# Patient Record
Sex: MALE | Race: BLACK OR AFRICAN AMERICAN | NOT HISPANIC OR LATINO
[De-identification: names, ages, dates, MRNs, and addresses within clinical notes are randomized per-mention and may not be internally consistent; named-entity substitution may affect disease eponyms.]

---

## 2017-01-09 ENCOUNTER — APPOINTMENT (OUTPATIENT)
Dept: HEART AND VASCULAR | Facility: CLINIC | Age: 67
End: 2017-01-09

## 2017-01-23 ENCOUNTER — APPOINTMENT (OUTPATIENT)
Dept: HEART AND VASCULAR | Facility: CLINIC | Age: 67
End: 2017-01-23

## 2017-01-29 ENCOUNTER — FORM ENCOUNTER (OUTPATIENT)
Age: 67
End: 2017-01-29

## 2017-01-30 ENCOUNTER — OUTPATIENT (OUTPATIENT)
Dept: OUTPATIENT SERVICES | Facility: HOSPITAL | Age: 67
LOS: 1 days | End: 2017-01-30
Payer: COMMERCIAL

## 2017-01-30 DIAGNOSIS — I25.10 ATHEROSCLEROTIC HEART DISEASE OF NATIVE CORONARY ARTERY WITHOUT ANGINA PECTORIS: ICD-10-CM

## 2017-01-30 DIAGNOSIS — I22.2 SUBSEQUENT NON-ST ELEVATION (NSTEMI) MYOCARDIAL INFARCTION: ICD-10-CM

## 2017-01-30 DIAGNOSIS — I10 ESSENTIAL (PRIMARY) HYPERTENSION: ICD-10-CM

## 2017-01-30 PROCEDURE — A9505: CPT

## 2017-01-30 PROCEDURE — 78452 HT MUSCLE IMAGE SPECT MULT: CPT

## 2017-01-30 PROCEDURE — 78452 HT MUSCLE IMAGE SPECT MULT: CPT | Mod: 26

## 2017-01-30 PROCEDURE — 93016 CV STRESS TEST SUPVJ ONLY: CPT

## 2017-01-30 PROCEDURE — A9500: CPT

## 2017-01-30 PROCEDURE — 93018 CV STRESS TEST I&R ONLY: CPT

## 2017-01-30 PROCEDURE — 93017 CV STRESS TEST TRACING ONLY: CPT

## 2017-06-05 ENCOUNTER — APPOINTMENT (OUTPATIENT)
Dept: HEART AND VASCULAR | Facility: CLINIC | Age: 67
End: 2017-06-05

## 2017-06-05 ENCOUNTER — NON-APPOINTMENT (OUTPATIENT)
Age: 67
End: 2017-06-05

## 2017-06-05 VITALS
DIASTOLIC BLOOD PRESSURE: 92 MMHG | HEART RATE: 87 BPM | WEIGHT: 183 LBS | OXYGEN SATURATION: 98 % | HEIGHT: 69 IN | SYSTOLIC BLOOD PRESSURE: 170 MMHG | BODY MASS INDEX: 27.11 KG/M2

## 2017-06-12 ENCOUNTER — LABORATORY RESULT (OUTPATIENT)
Age: 67
End: 2017-06-12

## 2017-06-12 ENCOUNTER — APPOINTMENT (OUTPATIENT)
Dept: HEART AND VASCULAR | Facility: CLINIC | Age: 67
End: 2017-06-12

## 2017-06-13 LAB
25(OH)D3 SERPL-MCNC: 17.7 NG/ML
ALBUMIN SERPL ELPH-MCNC: 4.6 G/DL
ALP BLD-CCNC: 70 U/L
ALT SERPL-CCNC: 27 U/L
ANION GAP SERPL CALC-SCNC: 19 MMOL/L
AST SERPL-CCNC: 25 U/L
BILIRUB SERPL-MCNC: 0.4 MG/DL
BUN SERPL-MCNC: 34 MG/DL
CALCIUM SERPL-MCNC: 9.5 MG/DL
CHLORIDE SERPL-SCNC: 102 MMOL/L
CHOLEST SERPL-MCNC: 251 MG/DL
CHOLEST/HDLC SERPL: 4.1 RATIO
CO2 SERPL-SCNC: 17 MMOL/L
CREAT SERPL-MCNC: 1.87 MG/DL
GLUCOSE SERPL-MCNC: 111 MG/DL
HDLC SERPL-MCNC: 61 MG/DL
LDLC SERPL CALC-MCNC: 148 MG/DL
POTASSIUM SERPL-SCNC: 4.6 MMOL/L
PROT SERPL-MCNC: 7.1 G/DL
SODIUM SERPL-SCNC: 138 MMOL/L
TRIGL SERPL-MCNC: 208 MG/DL
VIT B12 SERPL-MCNC: 568 PG/ML

## 2017-06-14 LAB — HBA1C MFR BLD HPLC: 5.5 %

## 2017-06-15 LAB — APO LP(A) SERPL-MCNC: 145 NMOL/L

## 2017-07-10 ENCOUNTER — APPOINTMENT (OUTPATIENT)
Dept: HEART AND VASCULAR | Facility: CLINIC | Age: 67
End: 2017-07-10

## 2017-08-28 ENCOUNTER — APPOINTMENT (OUTPATIENT)
Dept: INTERNAL MEDICINE | Facility: CLINIC | Age: 67
End: 2017-08-28

## 2018-02-15 ENCOUNTER — NON-APPOINTMENT (OUTPATIENT)
Age: 68
End: 2018-02-15

## 2018-02-15 ENCOUNTER — APPOINTMENT (OUTPATIENT)
Dept: HEART AND VASCULAR | Facility: CLINIC | Age: 68
End: 2018-02-15
Payer: COMMERCIAL

## 2018-02-15 VITALS
HEART RATE: 83 BPM | BODY MASS INDEX: 28.14 KG/M2 | SYSTOLIC BLOOD PRESSURE: 190 MMHG | HEIGHT: 69 IN | DIASTOLIC BLOOD PRESSURE: 92 MMHG | OXYGEN SATURATION: 99 % | WEIGHT: 190 LBS

## 2018-02-15 PROCEDURE — 99214 OFFICE O/P EST MOD 30 MIN: CPT | Mod: 25

## 2018-02-15 PROCEDURE — 93000 ELECTROCARDIOGRAM COMPLETE: CPT

## 2018-02-22 ENCOUNTER — APPOINTMENT (OUTPATIENT)
Dept: INTERNAL MEDICINE | Facility: CLINIC | Age: 68
End: 2018-02-22

## 2018-03-12 ENCOUNTER — APPOINTMENT (OUTPATIENT)
Dept: HEART AND VASCULAR | Facility: CLINIC | Age: 68
End: 2018-03-12

## 2018-06-26 ENCOUNTER — APPOINTMENT (OUTPATIENT)
Dept: HEART AND VASCULAR | Facility: CLINIC | Age: 68
End: 2018-06-26
Payer: COMMERCIAL

## 2018-06-26 VITALS
WEIGHT: 191 LBS | HEIGHT: 69 IN | HEART RATE: 81 BPM | DIASTOLIC BLOOD PRESSURE: 90 MMHG | SYSTOLIC BLOOD PRESSURE: 162 MMHG | BODY MASS INDEX: 28.29 KG/M2 | OXYGEN SATURATION: 98 %

## 2018-06-26 PROCEDURE — 99214 OFFICE O/P EST MOD 30 MIN: CPT

## 2018-09-05 ENCOUNTER — APPOINTMENT (OUTPATIENT)
Dept: HEART AND VASCULAR | Facility: CLINIC | Age: 68
End: 2018-09-05
Payer: MEDICARE

## 2018-09-05 VITALS
HEART RATE: 64 BPM | SYSTOLIC BLOOD PRESSURE: 160 MMHG | OXYGEN SATURATION: 98 % | DIASTOLIC BLOOD PRESSURE: 80 MMHG | HEIGHT: 69 IN

## 2018-09-05 DIAGNOSIS — R06.00 DYSPNEA, UNSPECIFIED: ICD-10-CM

## 2018-09-05 PROCEDURE — 93306 TTE W/DOPPLER COMPLETE: CPT

## 2018-09-05 PROCEDURE — 99214 OFFICE O/P EST MOD 30 MIN: CPT

## 2018-09-11 ENCOUNTER — APPOINTMENT (OUTPATIENT)
Dept: INTERNAL MEDICINE | Facility: CLINIC | Age: 68
End: 2018-09-11

## 2018-10-11 ENCOUNTER — APPOINTMENT (OUTPATIENT)
Dept: HEART AND VASCULAR | Facility: CLINIC | Age: 68
End: 2018-10-11

## 2018-10-29 ENCOUNTER — APPOINTMENT (OUTPATIENT)
Dept: INTERNAL MEDICINE | Facility: CLINIC | Age: 68
End: 2018-10-29

## 2018-10-29 DIAGNOSIS — Z13.89 ENCOUNTER FOR SCREENING FOR OTHER DISORDER: ICD-10-CM

## 2018-12-24 ENCOUNTER — APPOINTMENT (OUTPATIENT)
Dept: INTERNAL MEDICINE | Facility: CLINIC | Age: 68
End: 2018-12-24
Payer: MEDICARE

## 2018-12-24 ENCOUNTER — NON-APPOINTMENT (OUTPATIENT)
Age: 68
End: 2018-12-24

## 2018-12-24 VITALS
OXYGEN SATURATION: 99 % | HEIGHT: 69 IN | WEIGHT: 186 LBS | HEART RATE: 97 BPM | DIASTOLIC BLOOD PRESSURE: 83 MMHG | TEMPERATURE: 98.7 F | BODY MASS INDEX: 27.55 KG/M2 | SYSTOLIC BLOOD PRESSURE: 143 MMHG

## 2018-12-24 DIAGNOSIS — Z82.5 FAMILY HISTORY OF ASTHMA AND OTHER CHRONIC LOWER RESPIRATORY DISEASES: ICD-10-CM

## 2018-12-24 DIAGNOSIS — Z82.49 FAMILY HISTORY OF ISCHEMIC HEART DISEASE AND OTHER DISEASES OF THE CIRCULATORY SYSTEM: ICD-10-CM

## 2018-12-24 DIAGNOSIS — Z83.3 FAMILY HISTORY OF DIABETES MELLITUS: ICD-10-CM

## 2018-12-24 DIAGNOSIS — I22.2 SUBSEQUENT NON-ST ELEVATION (NSTEMI) MYOCARDIAL INFARCTION: ICD-10-CM

## 2018-12-24 DIAGNOSIS — Z23 ENCOUNTER FOR IMMUNIZATION: ICD-10-CM

## 2018-12-24 PROCEDURE — G0438: CPT

## 2018-12-24 PROCEDURE — 90662 IIV NO PRSV INCREASED AG IM: CPT

## 2018-12-24 PROCEDURE — 36415 COLL VENOUS BLD VENIPUNCTURE: CPT

## 2018-12-24 PROCEDURE — 90670 PCV13 VACCINE IM: CPT

## 2018-12-24 PROCEDURE — G0009: CPT

## 2018-12-24 PROCEDURE — 93000 ELECTROCARDIOGRAM COMPLETE: CPT

## 2018-12-24 PROCEDURE — G0008: CPT

## 2018-12-27 LAB
25(OH)D3 SERPL-MCNC: 25.9 NG/ML
ALBUMIN SERPL ELPH-MCNC: 4.5 G/DL
ALP BLD-CCNC: 75 U/L
ALT SERPL-CCNC: 21 U/L
ANION GAP SERPL CALC-SCNC: 12 MMOL/L
AST SERPL-CCNC: 20 U/L
BASOPHILS # BLD AUTO: 0.04 K/UL
BASOPHILS NFR BLD AUTO: 0.5 %
BILIRUB SERPL-MCNC: 0.5 MG/DL
BUN SERPL-MCNC: 17 MG/DL
CALCIUM SERPL-MCNC: 9.3 MG/DL
CHLORIDE SERPL-SCNC: 105 MMOL/L
CHOLEST SERPL-MCNC: 194 MG/DL
CHOLEST/HDLC SERPL: 3 RATIO
CO2 SERPL-SCNC: 17 MMOL/L
CREAT SERPL-MCNC: 1.53 MG/DL
EOSINOPHIL # BLD AUTO: 0.32 K/UL
EOSINOPHIL NFR BLD AUTO: 3.7 %
GLUCOSE SERPL-MCNC: 111 MG/DL
HBA1C MFR BLD HPLC: 5.2 %
HCT VFR BLD CALC: 44.6 %
HCV AB SER QL: NONREACTIVE
HCV S/CO RATIO: 0.06 S/CO
HDLC SERPL-MCNC: 65 MG/DL
HGB BLD-MCNC: 14.6 G/DL
IMM GRANULOCYTES NFR BLD AUTO: 0.2 %
LDLC SERPL CALC-MCNC: 96 MG/DL
LYMPHOCYTES # BLD AUTO: 1.35 K/UL
LYMPHOCYTES NFR BLD AUTO: 15.7 %
MAN DIFF?: NORMAL
MCHC RBC-ENTMCNC: 29.1 PG
MCHC RBC-ENTMCNC: 32.7 GM/DL
MCV RBC AUTO: 89 FL
MONOCYTES # BLD AUTO: 0.54 K/UL
MONOCYTES NFR BLD AUTO: 6.3 %
NEUTROPHILS # BLD AUTO: 6.35 K/UL
NEUTROPHILS NFR BLD AUTO: 73.6 %
PLATELET # BLD AUTO: 347 K/UL
POTASSIUM SERPL-SCNC: 4.3 MMOL/L
PROT SERPL-MCNC: 7.6 G/DL
RBC # BLD: 5.01 M/UL
RBC # FLD: 14.9 %
SODIUM SERPL-SCNC: 134 MMOL/L
TRIGL SERPL-MCNC: 167 MG/DL
WBC # FLD AUTO: 8.62 K/UL

## 2019-03-13 ENCOUNTER — RX CHANGE (OUTPATIENT)
Age: 69
End: 2019-03-13

## 2019-03-13 RX ORDER — HYDRALAZINE HYDROCHLORIDE 25 MG/1
25 TABLET ORAL TWICE DAILY
Qty: 180 | Refills: 3 | Status: DISCONTINUED | COMMUNITY
Start: 2018-09-05 | End: 2019-03-13

## 2019-09-04 ENCOUNTER — RX RENEWAL (OUTPATIENT)
Age: 69
End: 2019-09-04

## 2019-10-21 ENCOUNTER — RX RENEWAL (OUTPATIENT)
Age: 69
End: 2019-10-21

## 2020-01-04 ENCOUNTER — RX RENEWAL (OUTPATIENT)
Age: 70
End: 2020-01-04

## 2020-01-13 ENCOUNTER — RX RENEWAL (OUTPATIENT)
Age: 70
End: 2020-01-13

## 2020-01-25 ENCOUNTER — RX RENEWAL (OUTPATIENT)
Age: 70
End: 2020-01-25

## 2022-05-27 ENCOUNTER — NON-APPOINTMENT (OUTPATIENT)
Age: 72
End: 2022-05-27

## 2022-05-27 ENCOUNTER — APPOINTMENT (OUTPATIENT)
Dept: INTERNAL MEDICINE | Facility: CLINIC | Age: 72
End: 2022-05-27
Payer: MEDICARE

## 2022-05-27 VITALS
BODY MASS INDEX: 23.85 KG/M2 | OXYGEN SATURATION: 100 % | DIASTOLIC BLOOD PRESSURE: 62 MMHG | HEIGHT: 69 IN | WEIGHT: 161 LBS | HEART RATE: 128 BPM | TEMPERATURE: 36.8 F | SYSTOLIC BLOOD PRESSURE: 145 MMHG

## 2022-05-27 DIAGNOSIS — K62.5 HEMORRHAGE OF ANUS AND RECTUM: ICD-10-CM

## 2022-05-27 DIAGNOSIS — E78.49 OTHER HYPERLIPIDEMIA: ICD-10-CM

## 2022-05-27 DIAGNOSIS — R63.4 ABNORMAL WEIGHT LOSS: ICD-10-CM

## 2022-05-27 DIAGNOSIS — Z00.00 ENCOUNTER FOR GENERAL ADULT MEDICAL EXAMINATION W/OUT ABNORMAL FINDINGS: ICD-10-CM

## 2022-05-27 DIAGNOSIS — N52.9 MALE ERECTILE DYSFUNCTION, UNSPECIFIED: ICD-10-CM

## 2022-05-27 DIAGNOSIS — Z13.1 ENCOUNTER FOR SCREENING FOR DIABETES MELLITUS: ICD-10-CM

## 2022-05-27 DIAGNOSIS — R79.89 OTHER SPECIFIED ABNORMAL FINDINGS OF BLOOD CHEMISTRY: ICD-10-CM

## 2022-05-27 PROCEDURE — 93000 ELECTROCARDIOGRAM COMPLETE: CPT

## 2022-05-27 PROCEDURE — 36415 COLL VENOUS BLD VENIPUNCTURE: CPT

## 2022-05-27 PROCEDURE — 99202 OFFICE O/P NEW SF 15 MIN: CPT | Mod: 25

## 2022-05-27 PROCEDURE — G0438: CPT

## 2022-05-28 ENCOUNTER — INPATIENT (INPATIENT)
Facility: HOSPITAL | Age: 72
LOS: 4 days | Discharge: HOME | DRG: 377 | End: 2022-06-02
Attending: HOSPITALIST | Admitting: INTERNAL MEDICINE
Payer: MEDICARE

## 2022-05-28 VITALS
TEMPERATURE: 98 F | WEIGHT: 166.89 LBS | HEART RATE: 129 BPM | DIASTOLIC BLOOD PRESSURE: 93 MMHG | OXYGEN SATURATION: 99 % | HEIGHT: 68 IN | SYSTOLIC BLOOD PRESSURE: 215 MMHG | RESPIRATION RATE: 20 BRPM

## 2022-05-28 DIAGNOSIS — Z96.642 PRESENCE OF LEFT ARTIFICIAL HIP JOINT: Chronic | ICD-10-CM

## 2022-05-28 DIAGNOSIS — Z96.653 PRESENCE OF ARTIFICIAL KNEE JOINT, BILATERAL: Chronic | ICD-10-CM

## 2022-05-28 DIAGNOSIS — Z96.611 PRESENCE OF RIGHT ARTIFICIAL SHOULDER JOINT: Chronic | ICD-10-CM

## 2022-05-28 LAB
ALBUMIN SERPL ELPH-MCNC: 3.1 G/DL — LOW (ref 3.4–5)
ALP SERPL-CCNC: 82 U/L — SIGNIFICANT CHANGE UP (ref 40–120)
ALT FLD-CCNC: 14 U/L — SIGNIFICANT CHANGE UP (ref 12–42)
ANION GAP SERPL CALC-SCNC: 14 MMOL/L — SIGNIFICANT CHANGE UP (ref 9–16)
ANISOCYTOSIS BLD QL: SLIGHT — SIGNIFICANT CHANGE UP
APTT BLD: 23.4 SEC — LOW (ref 27.5–35.5)
AST SERPL-CCNC: 14 U/L — LOW (ref 15–37)
BASOPHILS # BLD AUTO: 0.07 K/UL — SIGNIFICANT CHANGE UP (ref 0–0.2)
BASOPHILS NFR BLD AUTO: 0.6 % — SIGNIFICANT CHANGE UP (ref 0–2)
BILIRUB SERPL-MCNC: 0.2 MG/DL — SIGNIFICANT CHANGE UP (ref 0.2–1.2)
BLD GP AB SCN SERPL QL: NEGATIVE — SIGNIFICANT CHANGE UP
BUN SERPL-MCNC: 44 MG/DL — HIGH (ref 7–23)
CALCIUM SERPL-MCNC: 8.2 MG/DL — LOW (ref 8.5–10.5)
CHLORIDE SERPL-SCNC: 107 MMOL/L — SIGNIFICANT CHANGE UP (ref 96–108)
CO2 SERPL-SCNC: 16 MMOL/L — LOW (ref 22–31)
CREAT SERPL-MCNC: 5.27 MG/DL — HIGH (ref 0.5–1.3)
EGFR: 11 ML/MIN/1.73M2 — LOW
EOSINOPHIL # BLD AUTO: 0.27 K/UL — SIGNIFICANT CHANGE UP (ref 0–0.5)
EOSINOPHIL NFR BLD AUTO: 2.2 % — SIGNIFICANT CHANGE UP (ref 0–6)
GLUCOSE SERPL-MCNC: 132 MG/DL — HIGH (ref 70–99)
HCT VFR BLD CALC: 17.5 % — CRITICAL LOW (ref 39–50)
HGB BLD-MCNC: 5.4 G/DL — CRITICAL LOW (ref 13–17)
IMM GRANULOCYTES NFR BLD AUTO: 0.6 % — SIGNIFICANT CHANGE UP (ref 0–1.5)
INR BLD: 0.94 — SIGNIFICANT CHANGE UP (ref 0.88–1.16)
LYMPHOCYTES # BLD AUTO: 1.81 K/UL — SIGNIFICANT CHANGE UP (ref 1–3.3)
LYMPHOCYTES # BLD AUTO: 14.6 % — SIGNIFICANT CHANGE UP (ref 13–44)
MANUAL SMEAR VERIFICATION: SIGNIFICANT CHANGE UP
MCHC RBC-ENTMCNC: 28 PG — SIGNIFICANT CHANGE UP (ref 27–34)
MCHC RBC-ENTMCNC: 30.9 GM/DL — LOW (ref 32–36)
MCV RBC AUTO: 90.7 FL — SIGNIFICANT CHANGE UP (ref 80–100)
MICROCYTES BLD QL: SLIGHT — SIGNIFICANT CHANGE UP
MONOCYTES # BLD AUTO: 0.75 K/UL — SIGNIFICANT CHANGE UP (ref 0–0.9)
MONOCYTES NFR BLD AUTO: 6 % — SIGNIFICANT CHANGE UP (ref 2–14)
NEUTROPHILS # BLD AUTO: 9.42 K/UL — HIGH (ref 1.8–7.4)
NEUTROPHILS NFR BLD AUTO: 76 % — SIGNIFICANT CHANGE UP (ref 43–77)
NRBC # BLD: 0 /100 WBCS — SIGNIFICANT CHANGE UP (ref 0–0)
OB PNL STL: POSITIVE
PLAT MORPH BLD: NORMAL — SIGNIFICANT CHANGE UP
PLATELET # BLD AUTO: 486 K/UL — HIGH (ref 150–400)
POLYCHROMASIA BLD QL SMEAR: SLIGHT — SIGNIFICANT CHANGE UP
POTASSIUM SERPL-MCNC: 5.1 MMOL/L — SIGNIFICANT CHANGE UP (ref 3.5–5.3)
POTASSIUM SERPL-SCNC: 5.1 MMOL/L — SIGNIFICANT CHANGE UP (ref 3.5–5.3)
PROT SERPL-MCNC: 6.4 G/DL — SIGNIFICANT CHANGE UP (ref 6.4–8.2)
PROTHROM AB SERPL-ACNC: 11 SEC — SIGNIFICANT CHANGE UP (ref 10.5–13.4)
RBC # BLD: 1.93 M/UL — LOW (ref 4.2–5.8)
RBC # FLD: 13.4 % — SIGNIFICANT CHANGE UP (ref 10.3–14.5)
RBC BLD AUTO: ABNORMAL
RH IG SCN BLD-IMP: POSITIVE — SIGNIFICANT CHANGE UP
RH IG SCN BLD-IMP: POSITIVE — SIGNIFICANT CHANGE UP
SARS-COV-2 RNA SPEC QL NAA+PROBE: SIGNIFICANT CHANGE UP
SODIUM SERPL-SCNC: 137 MMOL/L — SIGNIFICANT CHANGE UP (ref 132–145)
TROPONIN I, HIGH SENSITIVITY RESULT: 80.1 NG/L — HIGH
WBC # BLD: 12.4 K/UL — HIGH (ref 3.8–10.5)
WBC # FLD AUTO: 12.4 K/UL — HIGH (ref 3.8–10.5)

## 2022-05-28 PROCEDURE — 99291 CRITICAL CARE FIRST HOUR: CPT

## 2022-05-28 PROCEDURE — 71045 X-RAY EXAM CHEST 1 VIEW: CPT | Mod: 26

## 2022-05-28 PROCEDURE — 99291 CRITICAL CARE FIRST HOUR: CPT | Mod: 25

## 2022-05-28 PROCEDURE — 93010 ELECTROCARDIOGRAM REPORT: CPT

## 2022-05-28 RX ORDER — SODIUM CHLORIDE 9 MG/ML
1000 INJECTION INTRAMUSCULAR; INTRAVENOUS; SUBCUTANEOUS ONCE
Refills: 0 | Status: COMPLETED | OUTPATIENT
Start: 2022-05-28 | End: 2022-05-28

## 2022-05-28 RX ORDER — PANTOPRAZOLE SODIUM 20 MG/1
8 TABLET, DELAYED RELEASE ORAL
Qty: 80 | Refills: 0 | Status: DISCONTINUED | OUTPATIENT
Start: 2022-05-28 | End: 2022-05-31

## 2022-05-28 RX ORDER — ACETAMINOPHEN 500 MG
650 TABLET ORAL EVERY 6 HOURS
Refills: 0 | Status: DISCONTINUED | OUTPATIENT
Start: 2022-05-28 | End: 2022-06-02

## 2022-05-28 RX ORDER — AMLODIPINE BESYLATE 2.5 MG/1
10 TABLET ORAL ONCE
Refills: 0 | Status: COMPLETED | OUTPATIENT
Start: 2022-05-28 | End: 2022-05-28

## 2022-05-28 RX ORDER — PANTOPRAZOLE SODIUM 20 MG/1
80 TABLET, DELAYED RELEASE ORAL ONCE
Refills: 0 | Status: COMPLETED | OUTPATIENT
Start: 2022-05-28 | End: 2022-05-28

## 2022-05-28 RX ORDER — CHLORHEXIDINE GLUCONATE 213 G/1000ML
1 SOLUTION TOPICAL
Refills: 0 | Status: DISCONTINUED | OUTPATIENT
Start: 2022-05-28 | End: 2022-05-31

## 2022-05-28 RX ORDER — ALPRAZOLAM 0.25 MG
0.5 TABLET ORAL ONCE
Refills: 0 | Status: DISCONTINUED | OUTPATIENT
Start: 2022-05-28 | End: 2022-05-28

## 2022-05-28 RX ADMIN — AMLODIPINE BESYLATE 10 MILLIGRAM(S): 2.5 TABLET ORAL at 15:33

## 2022-05-28 RX ADMIN — Medication 0.5 MILLIGRAM(S): at 15:33

## 2022-05-28 RX ADMIN — PANTOPRAZOLE SODIUM 80 MILLIGRAM(S): 20 TABLET, DELAYED RELEASE ORAL at 17:00

## 2022-05-28 RX ADMIN — SODIUM CHLORIDE 1000 MILLILITER(S): 9 INJECTION INTRAMUSCULAR; INTRAVENOUS; SUBCUTANEOUS at 15:35

## 2022-05-28 RX ADMIN — PANTOPRAZOLE SODIUM 10 MG/HR: 20 TABLET, DELAYED RELEASE ORAL at 17:01

## 2022-05-28 NOTE — ED ADULT TRIAGE NOTE - CHIEF COMPLAINT QUOTE
per daughter, pt with gib last week, labs collected yesterday, pcp called instructing him to come to er for decreased h/h

## 2022-05-28 NOTE — ED PROVIDER NOTE - CRITICAL CARE ATTENDING CONTRIBUTION TO CARE
Pt is a 72yo M with a h/o HTN and reports intermittent dark and bloody stools for the past week.  Pt saw his PCP yesterday and labs were sent.  Also reporting TAVAREZ and decreased exercise tolerance.  +Fatigue.  Currently non-compliant with HTN meds.    ROS - Denies trauma/falls, fevers/chills, neck or back pain, headache, visual changes, sore throat, chest pain, palpitations, cough, SOB, abd pain, n/v/d, dysuria, hematuria, weakness, dizziness, numbness, lower extremity swelling, rash, sick contacts, recent hospitalizations, recent travels.  PE - agree with NP exam as above.   A/P - GI bleed, lower vs upper.  IV, labs.  H/H low - will need to be admitted for GI eval and blood transfusion.  Stable here in ED.  PPI bolus and drip.  MICU attending consulted.  Pt also found to be in renal failure so will need renal w/u while in house.

## 2022-05-28 NOTE — H&P ADULT - ATTENDING COMMENTS
71yoM with h/o HTN (off medication x2yrs) presents with history of melena and found to have acute blood loss anemia secondary to gi bleeding. No cp/sob/pre-syncope; no abd pain/n/v. He endorses subjective weakness/fatigue. +Anxious. Blood pressure consistently elevated, likely related to untreated hypertension with probable hypertensive CKD, possible component of VAMSHI from tubular ischemia. Tremulous on exam, clear lungs, heart without murmurs. /93. Hgb 5.4 Cr 5.2. Trop positive w/o isch on ecg. guaiac negtv. Will transfuse PRBC, follow up labs/ekg, obtain old records, treat HTN; consult GI and Nephrology and Cardiology. Full Code.

## 2022-05-28 NOTE — ED PROVIDER NOTE - CARE PLAN
1 Principal Discharge DX:	Anemia  Secondary Diagnosis:	Lower GI bleed  Secondary Diagnosis:	Acute renal failure

## 2022-05-28 NOTE — H&P ADULT - ASSESSMENT
71M PMH HTN presents for GIB x1 week    NEURO:  No active issues.    CARDIOVASCULAR:  #Hypertensive emergency  Initial vital signs: HR: 129, BP: 215/93, RR: 20, SpO2: 99% on RA  Trop high sens. (80.1).   EKG: NSR. LVH. TWI I, aVL, V4-V6.  norvasc 10mg PO x1 in ED    #Troponinemia  Trop high sens. (80.1)     PULM:    GI:  #GIB  Hgb 5.4 MCV 90.7  Pantoprazole 80mg IV x1 then gtt. NS 1L bolus x1.     Pt stopped seeking medical care since start of COVID pandemic, stopped taking his norvasc, HCTZ, ASA. He was otherwise at his usual baseline health until last Thursday 5/19 when he had rectal bleeding with bowel movements, in toilet bowl not mixed with stools, with clots present. He had another similar episode Tues. 5/31, prompting a PCP visit where labwork was performed. His doctor then called him on day of admission and urged him to present to ED for anemia. He has had weakness, fatigue, and decreased appetite. Of note, he has never had a GIB in past. Had a normal colonoscopy 6 yrs ago. Not on any anti-inflammatory meds recently. No bleeding anywhere else. No chest pain, palpitations, SOB, cough, abd pain, or n/v/d/c. No known family history of cancer.    RENAL:  #VAMSHI vs. VAMSHI on CKD  Bicarb 16. BUN/Cr 44/5.27. GFR 11.    ENDO:     ID:     HEME/ONC:  #Leukocytosis  WBC 12.4, afebrile. CXR: unremarkable.    #SIRS      #Thrombocytosis  Plt 486.     PREVENTIVE:   Fluids:  Diet:  DVT ppx:  GI ppx:         71M PMH HTN presents for GIB x1 week    NEURO:  No active issues.    CARDIOVASCULAR:  #Hypertensive emergency  -Initial vital signs: HR: 129, BP: 215/93, RR: 20, SpO2: 99% on RA  -Trop high sens. (80.1).   -EKG: NSR. LVH. TWI I, aVL, V4-V6.  -norvasc 10mg PO x1 in ED; off home meds HCTZ 25 and norvasc 10 for last few yrs  Likely i/s/o being off home BP meds  - cont to monitor now to avoid inducing hypotension i/s/o GIB    #Troponinemia  Trop high sens. (80.1)     PULM:    GI:  #GIB  Hgb 5.4 MCV 90.7  Pantoprazole 80mg IV x1 then gtt. NS 1L bolus x1.     Pt stopped seeking medical care since start of COVID pandemic, stopped taking his norvasc, HCTZ, ASA. He was otherwise at his usual baseline health until last Thursday 5/19 when he had rectal bleeding with bowel movements, in toilet bowl not mixed with stools, with clots present. He had another similar episode Tues. 5/31, prompting a PCP visit where labwork was performed. His doctor then called him on day of admission and urged him to present to ED for anemia. He has had weakness, fatigue, and decreased appetite. Of note, he has never had a GIB in past. Had a normal colonoscopy 6 yrs ago. Not on any anti-inflammatory meds recently. No bleeding anywhere else. No chest pain, palpitations, SOB, cough, abd pain, or n/v/d/c. No known family history of cancer.    RENAL:  #VAMSHI vs. VAMSHI on CKD  Bicarb 16. BUN/Cr 44/5.27. GFR 11.    ENDO:     ID:     HEME/ONC:  #Leukocytosis  WBC 12.4, afebrile. CXR: unremarkable.    #SIRS      #Thrombocytosis  Plt 486.     PREVENTIVE:   Fluids:  Diet:  DVT ppx:  GI ppx:         71M PMH HTN presents for GIB x1 week    NEURO:  No active issues.    CARDIOVASCULAR:  #Hypertensive emergency  -Initial vital signs: HR: 129, BP: 215/93, RR: 20, SpO2: 99% on RA  -Trop high sens. (80.1), VAMSHI  -EKG: NSR. LVH. TWI I, aVL, V4-V6.  -norvasc 10mg PO x1 in ED; off home meds HCTZ 25 and norvasc 10 for last few yrs  Likely i/s/o being off home BP meds  - cont to monitor for now to avoid inducing hypotension i/s/o GIB and given asymptomatic, can treat as needed     #Troponinemia  Trop high sens. (80.1)  - chest     PULM:    GI:  #GIB  Hgb 5.4 MCV 90.7  Pantoprazole 80mg IV x1 then gtt. NS 1L bolus x1.     Pt stopped seeking medical care since start of COVID pandemic, stopped taking his norvasc, HCTZ, ASA. He was otherwise at his usual baseline health until last Thursday 5/19 when he had rectal bleeding with bowel movements, in toilet bowl not mixed with stools, with clots present. He had another similar episode Tues. 5/31, prompting a PCP visit where labwork was performed. His doctor then called him on day of admission and urged him to present to ED for anemia. He has had weakness, fatigue, and decreased appetite. Of note, he has never had a GIB in past. Had a normal colonoscopy 6 yrs ago. Not on any anti-inflammatory meds recently. No bleeding anywhere else. No chest pain, palpitations, SOB, cough, abd pain, or n/v/d/c. No known family history of cancer.    RENAL:  #VAMSHI vs. VAMSHI on CKD  Bicarb 16. BUN/Cr 44/5.27. GFR 11.    ENDO:     ID:     HEME/ONC:  #Leukocytosis  WBC 12.4, afebrile. CXR: unremarkable.    #SIRS      #Thrombocytosis  Plt 486.     PREVENTIVE:   Fluids:  Diet:  DVT ppx:  GI ppx:         71M PMH HTN presents for GIB x1 week    NEURO:  No active issues.    CARDIOVASCULAR:  #Hypertensive emergency  -Initial vital signs: HR: 129, BP: 215/93, RR: 20, SpO2: 99% on RA  -Trop high sens. (80.1), VAMSHI  -EKG: NSR. LVH. TWI I, aVL, V4-V6.  -norvasc 10mg PO x1 in ED; off home meds HCTZ 25 and norvasc 10 for last few yrs  Likely i/s/o being off home BP meds  - cont to monitor for now to avoid inducing hypotension i/s/o GIB and given asymptomatic, can treat as needed     #Troponinemia  Trop high sens. (80.1). No chest pain. No ST changes on EKG. TWI I, aVL, V4-V6 of unknown chronicity.      PULM:  No active issues. Breathing comfortably on RA.     GI:  #GIB  Hgb 5.4 MCV 90.7  Pantoprazole 80mg IV x1 then gtt. NS 1L bolus x1.     Pt stopped seeking medical care since start of COVID pandemic, stopped taking his norvasc, HCTZ, ASA. He was otherwise at his usual baseline health until last Thursday 5/19 when he had rectal bleeding with bowel movements, in toilet bowl not mixed with stools, with clots present. He had another similar episode Tues. 5/31, prompting a PCP visit where labwork was performed. His doctor then called him on day of admission and urged him to present to ED for anemia. He has had weakness, fatigue, and decreased appetite. Of note, he has never had a GIB in past. Had a normal colonoscopy 6 yrs ago. Not on any anti-inflammatory meds recently. No bleeding anywhere else. No chest pain, palpitations, SOB, cough, abd pain, or n/v/d/c. No known family history of cancer.    RENAL:  #VAMSHI vs. VAMSHI on CKD  Bicarb 16. BUN/Cr 44/5.27. GFR 11.    ENDO:     ID:     HEME/ONC:  #Leukocytosis  WBC 12.4, afebrile. CXR: unremarkable.    #SIRS      #Thrombocytosis  Plt 486.     PREVENTIVE:   Fluids:  Diet:  DVT ppx:  GI ppx:         71M PMH HTN presents for GIB x1 week    NEURO:  No active issues.    CARDIOVASCULAR:  #Hypertensive emergency  -Initial vital signs: HR: 129, BP: 215/93, RR: 20, SpO2: 99% on RA  -Trop high sens. (80.1), VAMSHI  -EKG: NSR. LVH. TWI I, aVL, V4-V6.  -norvasc 10mg PO x1 in ED; off home meds HCTZ 25 and norvasc 10 for last few yrs  Likely i/s/o being off home BP meds  - cont to monitor for now to avoid inducing hypotension i/s/o GIB and given asymptomatic, can treat as needed     #Troponinemia  Trop high sens. (80.1). No chest pain. No ST changes on EKG. TWI I, aVL, V4-V6 of unknown chronicity.  - repeat if any chest pain  - AM EKG    PULM:  No active issues. Breathing comfortably on RA.     GI:  #GIB  -Hgb 5.4 MCV 90.7  -Pantoprazole 80mg IV x1 then gtt. NS 1L bolus x1.   -GIB x2 at home, BRBPR with bowel movements and associated weakness, fatigue, and decreased appetite.  -no prior GIB; normal colonoscopy 6 yrs ago; no known family history of cancer  -not on any anti-inflammatory meds recently  -exam notable for conjunctival pallor, clubbing, no rectal bleeding on walter    Plan:  - PPI gtt  - GI consult in AM  - maintain active T&S  - transfuse for Hgb<7    RENAL:  #VAMSHI vs. VAMSHI on CKD  Bicarb 16. BUN/Cr 44/5.27. GFR 11.  - f/u UA  - trend UOP  - renal u/s  - renal consult in AM    ENDO:   No active issues.    ID:   #Leukocytosis  WBC 12.4, afebrile. CXR: unremarkable.  - cont. to monitor    #SIRS  Leukocytosis with tachycardia and tachypnea. No suspected infectious source. LIkely 2/2 anemia from GIB.  - cont. to monitor    HEME/ONC:  #Anemia  Normocytic anemia with last known baseline Hgb 14.2 in 2016. Likely 2/2 GIB.  - manage per GIB above  - LDH, hapto in AM to r/o hemolysis  - f/u retic count    #Thrombocytosis  Plt 486. Suspect reactive.  - continue to monitor    PREVENTIVE:   Fluids: s/p 1L NS in ED  Diet: NPO pending likely scope  DVT ppx: hold i/s/o active GIB  GI ppx: pantoprazole gtt         71M PMH HTN presents for GIB x1 week    NEURO:  No active issues.    CARDIOVASCULAR:  #Hypertensive emergency  -Initial vital signs: HR: 129, BP: 215/93, RR: 20, SpO2: 99% on RA  -Trop high sens. (80.1), VAMSHI  -EKG: NSR. LVH. TWI I, aVL, V4-V6.  -norvasc 10mg PO x1 in ED; off home meds HCTZ 25 and norvasc 10 for last few yrs  Likely i/s/o being off home BP meds  - cont to monitor for now to avoid inducing hypotension i/s/o GIB and given asymptomatic, can treat as needed     #Troponinemia  Trop high sens. (80.1). No chest pain. No ST changes on EKG. TWI I, aVL, V4-V6 of unknown chronicity.  - repeat if any chest pain  - AM EKG    PULM:  No active issues. Breathing comfortably on RA.     GI:  #GIB  -Hgb 5.4 MCV 90.7  -Pantoprazole 80mg IV x1 then gtt. NS 1L bolus x1.   -GIB x2 at home, BRBPR with bowel movements and associated weakness, fatigue, and decreased appetite.  -no prior GIB; normal colonoscopy 6 yrs ago; no known family history of cancer  -not on any anti-inflammatory meds recently  -exam notable for conjunctival pallor, clubbing, no rectal bleeding on walter    Plan:  - PPI gtt  - GI consult in AM  - maintain active T&S  - transfuse for Hgb<7    RENAL:  #VAMSHI vs. VAMSHI on CKD  Bicarb 16. BUN/Cr 44/5.27. GFR 11. Possibly prerenal 2/2 decrased PO, dehydration vs. intrinsic i/s/o elevated BPs   - f/u UA, urine Na, urine Cr  - bladder scan to r/o obstruction  - trend UOP  - renal u/s  - renal consult in AM    #Low bicarb  Bicarb 16 on admission, likely metabolic acidosis 2/2 VAMSHI +- CKD and mild uremia BUN 44.   - lactate in AM  - treat underlying VAMSHI  - cont. to monitor    ENDO:   No active issues.    ID:   #Leukocytosis  WBC 12.4, afebrile. CXR: unremarkable.  - cont. to monitor    #SIRS  Leukocytosis with tachycardia and tachypnea. No suspected infectious source. LIkely 2/2 anemia from GIB.  - cont. to monitor    HEME/ONC:  #Anemia  Normocytic anemia with last known baseline Hgb 14.2 in 2016. Likely 2/2 GIB.  - manage per GIB above  - LDH, hapto in AM to r/o hemolysis  - f/u retic count    #Thrombocytosis  Plt 486. Suspect reactive.  - continue to monitor    PREVENTIVE:   Fluids: s/p 1L NS in ED  Diet: NPO pending likely scope  DVT ppx: hold i/s/o active GIB  GI ppx: pantoprazole gtt         71M PMH HTN presents for GIB x1 week, admitted for symptomatic anemia to 5.4 2/2 GIB, and hypertensive emergency    NEURO:  No active issues.    CARDIOVASCULAR:  #Hypertensive emergency  -Initial vital signs: HR: 129, BP: 215/93, RR: 20, SpO2: 99% on RA  -Trop high sens. (80.1), VAMSHI  -EKG: NSR. LVH. TWI I, aVL, V4-V6.  -norvasc 10mg PO x1 in ED; off home meds HCTZ 25 and norvasc 10 for last few yrs  Likely i/s/o being off home BP meds  - cont to monitor for now to avoid inducing hypotension i/s/o GIB and given asymptomatic, can treat as needed     #Troponinemia  Trop high sens. (80.1). No chest pain. No ST changes on EKG. TWI I, aVL, V4-V6 of unknown chronicity.  - repeat if any chest pain  - AM EKG    PULM:  No active issues. Breathing comfortably on RA.     GI:  #GIB  -Hgb 5.4 MCV 90.7  -Pantoprazole 80mg IV x1 then gtt. NS 1L bolus x1.   -GIB x2 at home, BRBPR with bowel movements and associated weakness, fatigue, and decreased appetite.  -no prior GIB; normal colonoscopy 6 yrs ago; no known family history of cancer  -not on any anti-inflammatory meds recently  -exam notable for conjunctival pallor, clubbing, no rectal bleeding on walter    Plan:  - PPI gtt  - GI consult in AM  - maintain active T&S  - transfuse for Hgb<7    RENAL:  #VAMSHI vs. VAMSHI on CKD  Bicarb 16. BUN/Cr 44/5.27. GFR 11. Possibly prerenal 2/2 decrased PO, dehydration vs. intrinsic i/s/o elevated BPs   - f/u UA, urine Na, urine Cr  - bladder scan to r/o obstruction  - trend UOP  - renal u/s  - renal consult in AM    #Low bicarb  Bicarb 16 on admission, likely metabolic acidosis 2/2 VAMSHI +- CKD and mild uremia BUN 44.   - lactate in AM  - treat underlying VAMSHI  - cont. to monitor    ENDO:   No active issues.    ID:   #Leukocytosis  WBC 12.4, afebrile. CXR: unremarkable.  - cont. to monitor    #SIRS  Leukocytosis with tachycardia and tachypnea. No suspected infectious source. LIkely 2/2 anemia from GIB.  - cont. to monitor    HEME/ONC:  #Anemia  Normocytic anemia with last known baseline Hgb 14.2 in 2016. Likely 2/2 GIB.  - manage per GIB above  - LDH, hapto in AM to r/o hemolysis  - f/u retic count    #Thrombocytosis  Plt 486. Suspect reactive.  - continue to monitor    PREVENTIVE:   Fluids: s/p 1L NS in ED  Diet: NPO pending likely scope  DVT ppx: hold i/s/o active GIB  GI ppx: pantoprazole gtt

## 2022-05-28 NOTE — H&P ADULT - HISTORY OF PRESENT ILLNESS
HPI:     Mr. Dudley is a 71 yr old man with a PMH of HTN who presents for GIB.    Pt stopped seeking medical care since start of COVID pandemic, stopped taking his norvasc, HCTZ, ASA. He was otherwise at his usual baseline health until last Thursday 5/19 when he had rectal bleeding with bowel movements, in toilet bowl not mixed with stools, with clots present. He had another similar episode Tues. 5/31, prompting a PCP visit where labwork was performed. His doctor then called him on day of admission and urged him to present to ED for anemia. He has had weakness, fatigue, and decreased appetite. Of note, he has never had a GIB in past. Had a normal colonoscopy 6 yrs ago. Not on any anti-inflammatory meds recently. No bleeding anywhere else. No chest pain, palpitations, SOB, cough, abd pain, or n/v/d/c. No known family history of cancer.    In the ED:  Initial vital signs: T: 98 F, HR: 129, BP: 215/93, RR: 20, SpO2: 99% on RA  ED course:   Labs: WBC 12.4, Hgb 5.4 MCV 90.7, Plt 486. Trop high sens. (80.1). Bicarb 16. BUN/Cr 44/5.27. GFR 11. COVID neg.  Imaging:  CXR: unremarkable.  EKG: NSR. LVH. TWI I, aVL, V4-V6.  Medications: xanax 0.5 mg PO x1, norvasc 10mg PO x1, pantoprazole 80mg IV x1 then gtt. NS 1L bolus x1.   Consults: ICU.

## 2022-05-28 NOTE — ED PROVIDER NOTE - NS ED ROS FT
Constitutional:  no fever, no chills  Eyes:  no discharge, no irritations, no pain, no redness, visual changes  Ears:  no ear pain, no ear drainage,  no hearing problems  Nose:  no nasal congestion, no nasal drainage  Mouth/Throat:  no hoarseness and no throat pain  Neck:  no stiffness, no pain, no lumps, no swollen glands  Cardiac:  no chest pain, no edema  Respiratory:  no cough, no shortness of breath  GI: no abdominal pain, no bloating, no constipation, no diarrhea, no nausea, no vomiting  :  no dysuria, no urinary frequency, no hematuria, no discharge  MSK:  no back pain, no msk pain, no weakness  NEURO:  no headache, no weakness, no numbness  Skin:  no lesions, no pruritis, no jaundice, no bruising, no rash  Psych:  no known mental health issues  Endocrine:  no diabetes, no thyroid issues

## 2022-05-28 NOTE — ED ADULT NURSE NOTE - BREATHING, MLM
Continue current meds. She is to make sure to drink enough water, update if persistent dizziness. Fu in 3 mo for recheck.    Spontaneous, unlabored and symmetrical

## 2022-05-28 NOTE — ED ADULT NURSE REASSESSMENT NOTE - NS ED NURSE REASSESS COMMENT FT1
Received Pt from previous RN lying on stretcher awake and alert, breathing without issue and NAD. CCM in place, IV patent with ordered meds infusing as ordered. Family at the bedside. Awaiting bed status.

## 2022-05-28 NOTE — H&P ADULT - NSHPSOCIALHISTORY_GEN_ALL_CORE
- drinks a few drinks of alchol vodka or Sarah 3-4x/week  - never smoker  - remote history of marijuana use  - independent in daily activities

## 2022-05-28 NOTE — H&P ADULT - NSHPPHYSICALEXAM_GEN_ALL_CORE
VITAL SIGNS:  T(C): 37.1 (05-28-22 @ 20:54), Max: 37.1 (05-28-22 @ 20:54)  T(F): 98.8 (05-28-22 @ 20:54), Max: 98.8 (05-28-22 @ 20:54)  HR: 91 (05-28-22 @ 20:54) (77 - 129)  BP: 157/70 (05-28-22 @ 20:54) (149/68 - 220/88)  BP(mean): 101 (05-28-22 @ 20:54) (101 - 101)  RR: 18 (05-28-22 @ 20:54) (17 - 20)  SpO2: 100% (05-28-22 @ 20:54) (98% - 100%)  Wt(kg): --    PHYSICAL EXAM:    Constitutional: tremulous in bed, anxious  HEENT: NC/AT, EOMI, (+) conjunctival pallor; no nasal discharge, MMM   Respiratory: CTA B/L with mildly decreased breath sounds in R posterior field  Cardiac: +S1/S2; RRR; no M/R/G appreciated  Gastrointestinal: abdomen soft, NT/ND, +BS, no guarding  Extremities: legs WWP, (+) clubbing in hands bilaterally; no peripheral edema  Dermatologic: skin warm, dry and intact; no rashes, wounds, or scars noted  Neurologic: alert and oriented, no focal deficits noted  Rectal: no rectal blood on digital rectal exam

## 2022-05-28 NOTE — H&P ADULT - NSHPLABSRESULTS_GEN_ALL_CORE
5.4    12.40 )-----------( 486      ( 28 May 2022 14:10 )             17.5       05-28    137  |  107  |  44<H>  ----------------------------<  132<H>  5.1   |  16<L>  |  5.27<H>    Ca    8.2<L>      28 May 2022 14:10    TPro  6.4  /  Alb  3.1<L>  /  TBili  0.2  /  DBili  x   /  AST  14<L>  /  ALT  14  /  AlkPhos  82  05-28          PT/INR - ( 28 May 2022 14:10 )   PT: 11.0 sec;   INR: 0.94          PTT - ( 28 May 2022 14:10 )  PTT:23.4 sec    Lactate Trend      CAPILLARY BLOOD GLUCOSE

## 2022-05-28 NOTE — H&P ADULT - NSICDXPASTSURGICALHX_GEN_ALL_CORE_FT
PAST SURGICAL HISTORY:  History of arthroplasty of right shoulder     History of bilateral knee replacement     History of left hip replacement 2014

## 2022-05-28 NOTE — ED PROVIDER NOTE - NSICDXPASTMEDICALHX_GEN_ALL_CORE_FT
PAST MEDICAL HISTORY:  HTN (hypertension)      PAST MEDICAL HISTORY:  HTN (hypertension)     White coat syndrome with hypertension

## 2022-05-28 NOTE — ED ADULT NURSE NOTE - NS ED NURSE LEVEL OF CONSCIOUSNESS MENTAL STATUS
Additional Information    Negative: Passed out (i.e., lost consciousness, collapsed and was not responding)    Negative: Shock suspected (e.g., cold/pale/clammy skin, too weak to stand, low BP, rapid pulse)    Negative: Difficult to awaken or acting confused  (e.g., disoriented, slurred speech)    Negative: Sounds like a life-threatening emergency to the triager    Negative: Followed a major injury to the back (e.g., MVA, fall > 10 feet or 3 meters, penetrating injury, etc.)    Negative: Back pain or flank pain during pregnancy    Negative: Upper, mid or lower back pain that occurs mainly in the midline    Protocols used: FLANK PAIN-ADULT-AH  Pt has had increasing left side pain for 4-6 hours. Nothing helps her feel better. Hurts to move. No fever. No vomiting or diarrhea. Pain is 8/10 scale. Advised to have someone drive her to [facility] ED. Patient voices understanding and is in agreement with this plan, although is thinking of driving herself. I again advised to have someone drive her.     Tanesha Smith RN, [facility]     Awake/Alert/Cooperative

## 2022-05-28 NOTE — ED PROVIDER NOTE - CLINICAL SUMMARY MEDICAL DECISION MAKING FREE TEXT BOX
70 y/o M presents to ED c/o fatigue and TAVAREZ in the setting of anemia secondary to recent LGIB.  Pt well appearing, NAD.  Pt hypertensive, 200's/100's.  Abd soft, non-tender.   Labs notable for H&H 5.4/17, BUN/Cr 44/5.27.  CXR notable for cardiomegaly.  EKG shows lateral Twave inversions.  Troponin is elevated, will require trending due to renal function.  Pt denies CP.      Pt discussed [facility] via [facility].  Pt accepted for admission to [facility] ICU.  Pt is amenable to admission.

## 2022-05-28 NOTE — ED PROVIDER NOTE - PHYSICAL EXAMINATION
Constitutional:  Well appearing, awake, alert, oriented to person, place, time/situation and in no apparent distress  Head:  NC/AT, symmetric, neck supple  ENMT: Airway patent, nasal mucosa clear, mouth with normal mucosa, throat has no vesicles, no oropharyngeal exudates and uvula is midline  Eyes:  Clear bilaterally, pupils equal, round and reactive to light, pale conjunctiva  Cardiac:  Normal rate, regular rhythm. Heart sounds S1,S2.  No murmurs, rubs or gallops.  No JVD.  Respiratory:  Breath sounds clear and equal bilaterally  GI:   Abd soft, non-distended, non-tender, no guarding  :  no CVAT, no stool in rectal vault, no external hemorrhoids, no rectal tenderness  MSK:  Spine appears normal, range of motion is not limited, no muscle or joint tenderness  Neuro:  Alert and oriented, no focal deficits, no motor or sensory deficits  Skin:  Skin normal color for race, warm, dry and intact.  No evidence of rash  Psych:  Normal mood and affect, no apparent risk to self or others.  Heme:  No adenopathy or splenomegaly.

## 2022-05-28 NOTE — ED PROVIDER NOTE - OBJECTIVE STATEMENT
72 y/o M with PMH of HTN, white coat syndrome presents to ED c/o 72 y/o M with PMH of HTN, white coat syndrome presents to ED, sent in by PCP for low H&H.  Pt states he noted black stool with BRB in toilet 7 days ago and again 5 days ago.  The bleeding stopped w 70 y/o M with PMH of HTN, white coat syndrome presents to ED, sent in by PCP for low H&H.  Pt states he noted painless black stool with BRB in toilet 7 days ago and again 5 days ago.  The bleeding stopped without intervention.  He reports nl brown stools since.  Pt refused to come to ED for evaluation.  He was seen by his PCP yesterday and was sent for labs.  He c/o fatigue and shortness of breath when walking.    Pts blood pressure is markedly elevated at triage.  He admits he ran out of medications during COVID and did not go to the doctors office for a new prescription.  He has been off HTN meds for approx 2 years.  He states his BP was 140's systolic at his PCPs office yesterday.    Pt denies trauma/falls, fevers/chills, neck or back pain, headache, visual changes, sore throat, chest pain, palpitations, cough, SOB, abd pain, n/v/d, dysuria, hematuria, weakness, dizziness, numbness, lower extremity swelling, rash, sick contacts, recent hospitalizations, recent travels.

## 2022-05-28 NOTE — H&P ADULT - NSHPREVIEWOFSYSTEMS_GEN_ALL_CORE
General: (+) decreased PO x10-12 days; (+) fatigue per HPI; no fevers, chills, or sweats  HEENT: no dizziness  CV: no chest pain or palpitations; no lightheadedness or dizziness  Pulm: no SOB or cough  GI: (+) GIB per HPI; no abd pain, no n/v/d/c

## 2022-05-29 LAB
25(OH)D3 SERPL-MCNC: 21.8 NG/ML
A1C WITH ESTIMATED AVERAGE GLUCOSE RESULT: 5.5 % — SIGNIFICANT CHANGE UP (ref 4–5.6)
ALBUMIN SERPL ELPH-MCNC: 2.8 G/DL — LOW (ref 3.3–5)
ALBUMIN SERPL ELPH-MCNC: 3.4 G/DL — SIGNIFICANT CHANGE UP (ref 3.3–5)
ALBUMIN SERPL ELPH-MCNC: 3.7 G/DL
ALP BLD-CCNC: 75 U/L
ALP SERPL-CCNC: 68 U/L — SIGNIFICANT CHANGE UP (ref 40–120)
ALP SERPL-CCNC: 78 U/L — SIGNIFICANT CHANGE UP (ref 40–120)
ALT FLD-CCNC: 5 U/L — LOW (ref 10–45)
ALT FLD-CCNC: 7 U/L — LOW (ref 10–45)
ALT SERPL-CCNC: 6 U/L
ANION GAP SERPL CALC-SCNC: 12 MMOL/L — SIGNIFICANT CHANGE UP (ref 5–17)
ANION GAP SERPL CALC-SCNC: 16 MMOL/L
ANION GAP SERPL CALC-SCNC: 9 MMOL/L — SIGNIFICANT CHANGE UP (ref 5–17)
APPEARANCE UR: CLEAR — SIGNIFICANT CHANGE UP
APTT BLD: 22.9 SEC — LOW (ref 27.5–35.5)
AST SERPL-CCNC: 16 U/L — SIGNIFICANT CHANGE UP (ref 10–40)
AST SERPL-CCNC: 7 U/L
AST SERPL-CCNC: 8 U/L — LOW (ref 10–40)
BACTERIA # UR AUTO: PRESENT /HPF
BASOPHILS # BLD AUTO: 0.06 K/UL — SIGNIFICANT CHANGE UP (ref 0–0.2)
BASOPHILS # BLD AUTO: 0.09 K/UL
BASOPHILS NFR BLD AUTO: 0.4 % — SIGNIFICANT CHANGE UP (ref 0–2)
BASOPHILS NFR BLD AUTO: 0.7 %
BILIRUB SERPL-MCNC: 0.2 MG/DL
BILIRUB SERPL-MCNC: 1.1 MG/DL — SIGNIFICANT CHANGE UP (ref 0.2–1.2)
BILIRUB SERPL-MCNC: 1.4 MG/DL — HIGH (ref 0.2–1.2)
BILIRUB UR-MCNC: NEGATIVE — SIGNIFICANT CHANGE UP
BUN SERPL-MCNC: 39 MG/DL — HIGH (ref 7–23)
BUN SERPL-MCNC: 40 MG/DL — HIGH (ref 7–23)
BUN SERPL-MCNC: 46 MG/DL
CALCIUM SERPL-MCNC: 7.8 MG/DL — LOW (ref 8.4–10.5)
CALCIUM SERPL-MCNC: 8 MG/DL
CALCIUM SERPL-MCNC: 8.2 MG/DL — LOW (ref 8.4–10.5)
CEA SERPL-MCNC: 4.9 NG/ML
CHLORIDE SERPL-SCNC: 107 MMOL/L
CHLORIDE SERPL-SCNC: 108 MMOL/L — SIGNIFICANT CHANGE UP (ref 96–108)
CHLORIDE SERPL-SCNC: 109 MMOL/L — HIGH (ref 96–108)
CHOLEST SERPL-MCNC: 186 MG/DL — SIGNIFICANT CHANGE UP
CHOLEST SERPL-MCNC: 190 MG/DL
CO2 SERPL-SCNC: 14 MMOL/L
CO2 SERPL-SCNC: 16 MMOL/L — LOW (ref 22–31)
CO2 SERPL-SCNC: 17 MMOL/L — LOW (ref 22–31)
COLOR SPEC: YELLOW — SIGNIFICANT CHANGE UP
CREAT ?TM UR-MCNC: 110 MG/DL — SIGNIFICANT CHANGE UP
CREAT SERPL-MCNC: 4.43 MG/DL — HIGH (ref 0.5–1.3)
CREAT SERPL-MCNC: 4.62 MG/DL — HIGH (ref 0.5–1.3)
CREAT SERPL-MCNC: 5.5 MG/DL
DIFF PNL FLD: ABNORMAL
EGFR: 10 ML/MIN/1.73M2
EGFR: 13 ML/MIN/1.73M2 — LOW
EGFR: 14 ML/MIN/1.73M2 — LOW
EOSINOPHIL # BLD AUTO: 0.31 K/UL — SIGNIFICANT CHANGE UP (ref 0–0.5)
EOSINOPHIL # BLD AUTO: 0.4 K/UL
EOSINOPHIL NFR BLD AUTO: 2.3 % — SIGNIFICANT CHANGE UP (ref 0–6)
EOSINOPHIL NFR BLD AUTO: 3.1 %
EPI CELLS # UR: SIGNIFICANT CHANGE UP /HPF (ref 0–5)
ESTIMATED AVERAGE GLUCOSE: 103 MG/DL
ESTIMATED AVERAGE GLUCOSE: 111 MG/DL — SIGNIFICANT CHANGE UP (ref 68–114)
FERRITIN SERPL-MCNC: 32 NG/ML
GLUCOSE SERPL-MCNC: 114 MG/DL — HIGH (ref 70–99)
GLUCOSE SERPL-MCNC: 129 MG/DL
GLUCOSE SERPL-MCNC: 98 MG/DL — SIGNIFICANT CHANGE UP (ref 70–99)
GLUCOSE UR QL: NEGATIVE — SIGNIFICANT CHANGE UP
HAPTOGLOB SERPL-MCNC: SIGNIFICANT CHANGE UP MG/DL (ref 34–200)
HBA1C MFR BLD HPLC: 5.2 %
HCT VFR BLD CALC: 18.1 %
HCT VFR BLD CALC: 19.1 % — CRITICAL LOW (ref 39–50)
HCT VFR BLD CALC: 22.4 % — LOW (ref 39–50)
HCT VFR BLD CALC: 23.3 % — LOW (ref 39–50)
HCV AB S/CO SERPL IA: 0.04 S/CO — SIGNIFICANT CHANGE UP
HCV AB SERPL-IMP: SIGNIFICANT CHANGE UP
HDLC SERPL-MCNC: 52 MG/DL
HDLC SERPL-MCNC: 55 MG/DL — SIGNIFICANT CHANGE UP
HGB BLD-MCNC: 5.6 G/DL
HGB BLD-MCNC: 6.3 G/DL — CRITICAL LOW (ref 13–17)
HGB BLD-MCNC: 7.4 G/DL — LOW (ref 13–17)
HGB BLD-MCNC: 7.4 G/DL — LOW (ref 13–17)
IMM GRANULOCYTES NFR BLD AUTO: 0.6 %
IMM GRANULOCYTES NFR BLD AUTO: 0.7 % — SIGNIFICANT CHANGE UP (ref 0–1.5)
INR BLD: 0.92 — SIGNIFICANT CHANGE UP (ref 0.88–1.16)
IRON SATN MFR SERPL: 4 %
IRON SERPL-MCNC: 11 UG/DL
KETONES UR-MCNC: NEGATIVE — SIGNIFICANT CHANGE UP
LACTATE SERPL-SCNC: 0.9 MMOL/L — SIGNIFICANT CHANGE UP (ref 0.5–2)
LDH SERPL L TO P-CCNC: 245 U/L — HIGH (ref 50–242)
LDLC SERPL CALC-MCNC: 118 MG/DL
LEUKOCYTE ESTERASE UR-ACNC: ABNORMAL
LIPID PNL WITH DIRECT LDL SERPL: 108 MG/DL — HIGH
LYMPHOCYTES # BLD AUTO: 1.08 K/UL — SIGNIFICANT CHANGE UP (ref 1–3.3)
LYMPHOCYTES # BLD AUTO: 1.66 K/UL
LYMPHOCYTES # BLD AUTO: 8.1 % — LOW (ref 13–44)
LYMPHOCYTES NFR BLD AUTO: 12.8 %
MAGNESIUM SERPL-MCNC: 2 MG/DL — SIGNIFICANT CHANGE UP (ref 1.6–2.6)
MAGNESIUM SERPL-MCNC: 2.1 MG/DL — SIGNIFICANT CHANGE UP (ref 1.6–2.6)
MAN DIFF?: NORMAL
MCHC RBC-ENTMCNC: 28.7 PG — SIGNIFICANT CHANGE UP (ref 27–34)
MCHC RBC-ENTMCNC: 29 PG
MCHC RBC-ENTMCNC: 29.3 PG — SIGNIFICANT CHANGE UP (ref 27–34)
MCHC RBC-ENTMCNC: 29.8 PG — SIGNIFICANT CHANGE UP (ref 27–34)
MCHC RBC-ENTMCNC: 30.9 GM/DL
MCHC RBC-ENTMCNC: 31.8 GM/DL — LOW (ref 32–36)
MCHC RBC-ENTMCNC: 33 GM/DL — SIGNIFICANT CHANGE UP (ref 32–36)
MCHC RBC-ENTMCNC: 33 GM/DL — SIGNIFICANT CHANGE UP (ref 32–36)
MCV RBC AUTO: 88.8 FL — SIGNIFICANT CHANGE UP (ref 80–100)
MCV RBC AUTO: 90.3 FL — SIGNIFICANT CHANGE UP (ref 80–100)
MCV RBC AUTO: 90.3 FL — SIGNIFICANT CHANGE UP (ref 80–100)
MCV RBC AUTO: 93.8 FL
MONOCYTES # BLD AUTO: 0.66 K/UL — SIGNIFICANT CHANGE UP (ref 0–0.9)
MONOCYTES # BLD AUTO: 0.75 K/UL
MONOCYTES NFR BLD AUTO: 4.9 % — SIGNIFICANT CHANGE UP (ref 2–14)
MONOCYTES NFR BLD AUTO: 5.8 %
NEUTROPHILS # BLD AUTO: 10.03 K/UL
NEUTROPHILS # BLD AUTO: 11.18 K/UL — HIGH (ref 1.8–7.4)
NEUTROPHILS NFR BLD AUTO: 77 %
NEUTROPHILS NFR BLD AUTO: 83.6 % — HIGH (ref 43–77)
NITRITE UR-MCNC: NEGATIVE — SIGNIFICANT CHANGE UP
NON HDL CHOLESTEROL: 131 MG/DL — HIGH
NONHDLC SERPL-MCNC: 138 MG/DL
NRBC # BLD: 0 /100 WBCS — SIGNIFICANT CHANGE UP (ref 0–0)
PH UR: 7 — SIGNIFICANT CHANGE UP (ref 5–8)
PHOSPHATE SERPL-MCNC: 3.9 MG/DL — SIGNIFICANT CHANGE UP (ref 2.5–4.5)
PHOSPHATE SERPL-MCNC: 4 MG/DL — SIGNIFICANT CHANGE UP (ref 2.5–4.5)
PLATELET # BLD AUTO: 331 K/UL — SIGNIFICANT CHANGE UP (ref 150–400)
PLATELET # BLD AUTO: 377 K/UL — SIGNIFICANT CHANGE UP (ref 150–400)
PLATELET # BLD AUTO: 387 K/UL — SIGNIFICANT CHANGE UP (ref 150–400)
PLATELET # BLD AUTO: 479 K/UL
POTASSIUM SERPL-MCNC: 5.2 MMOL/L — SIGNIFICANT CHANGE UP (ref 3.5–5.3)
POTASSIUM SERPL-MCNC: 5.6 MMOL/L — HIGH (ref 3.5–5.3)
POTASSIUM SERPL-SCNC: 5.2 MMOL/L — SIGNIFICANT CHANGE UP (ref 3.5–5.3)
POTASSIUM SERPL-SCNC: 5.3 MMOL/L
POTASSIUM SERPL-SCNC: 5.6 MMOL/L — HIGH (ref 3.5–5.3)
PROT SERPL-MCNC: 5 G/DL — LOW (ref 6–8.3)
PROT SERPL-MCNC: 5.7 G/DL
PROT SERPL-MCNC: 5.7 G/DL — LOW (ref 6–8.3)
PROT UR-MCNC: 100 MG/DL
PROTHROM AB SERPL-ACNC: 10.9 SEC — SIGNIFICANT CHANGE UP (ref 10.5–13.4)
PSA SERPL-MCNC: 17.3 NG/ML
RBC # BLD: 1.93 M/UL
RBC # BLD: 2.15 M/UL — LOW (ref 4.2–5.8)
RBC # BLD: 2.48 M/UL — LOW (ref 4.2–5.8)
RBC # BLD: 2.58 M/UL — LOW (ref 4.2–5.8)
RBC # BLD: 2.58 M/UL — LOW (ref 4.2–5.8)
RBC # FLD: 13.7 %
RBC # FLD: 13.7 % — SIGNIFICANT CHANGE UP (ref 10.3–14.5)
RBC # FLD: 14.5 % — SIGNIFICANT CHANGE UP (ref 10.3–14.5)
RBC # FLD: 14.8 % — HIGH (ref 10.3–14.5)
RBC CASTS # UR COMP ASSIST: < 5 /HPF — SIGNIFICANT CHANGE UP
RETICS #: 86.2 K/UL — SIGNIFICANT CHANGE UP (ref 25–125)
RETICS/RBC NFR: 3.3 % — HIGH (ref 0.5–2.5)
SODIUM SERPL-SCNC: 134 MMOL/L — LOW (ref 135–145)
SODIUM SERPL-SCNC: 137 MMOL/L — SIGNIFICANT CHANGE UP (ref 135–145)
SODIUM SERPL-SCNC: 138 MMOL/L
SODIUM UR-SCNC: 51 MMOL/L — SIGNIFICANT CHANGE UP
SP GR SPEC: 1.01 — SIGNIFICANT CHANGE UP (ref 1–1.03)
T4 FREE SERPL-MCNC: 0.9 NG/DL
TIBC SERPL-MCNC: 262 UG/DL
TRIGL SERPL-MCNC: 103 MG/DL
TRIGL SERPL-MCNC: 113 MG/DL — SIGNIFICANT CHANGE UP
TSH SERPL-ACNC: 1.96 UIU/ML
TSH SERPL-MCNC: 3.2 UIU/ML — SIGNIFICANT CHANGE UP (ref 0.27–4.2)
UIBC SERPL-MCNC: 250 UG/DL
UROBILINOGEN FLD QL: 0.2 E.U./DL — SIGNIFICANT CHANGE UP
WBC # BLD: 10.46 K/UL — SIGNIFICANT CHANGE UP (ref 3.8–10.5)
WBC # BLD: 12.1 K/UL — HIGH (ref 3.8–10.5)
WBC # BLD: 13.38 K/UL — HIGH (ref 3.8–10.5)
WBC # FLD AUTO: 10.46 K/UL — SIGNIFICANT CHANGE UP (ref 3.8–10.5)
WBC # FLD AUTO: 12.1 K/UL — HIGH (ref 3.8–10.5)
WBC # FLD AUTO: 13.01 K/UL
WBC # FLD AUTO: 13.38 K/UL — HIGH (ref 3.8–10.5)
WBC UR QL: ABNORMAL /HPF

## 2022-05-29 PROCEDURE — 99291 CRITICAL CARE FIRST HOUR: CPT | Mod: GC

## 2022-05-29 PROCEDURE — 76770 US EXAM ABDO BACK WALL COMP: CPT | Mod: 26

## 2022-05-29 RX ORDER — AMLODIPINE BESYLATE 2.5 MG/1
10 TABLET ORAL EVERY 24 HOURS
Refills: 0 | Status: DISCONTINUED | OUTPATIENT
Start: 2022-05-30 | End: 2022-05-30

## 2022-05-29 RX ORDER — HYDRALAZINE HCL 50 MG
10 TABLET ORAL EVERY 6 HOURS
Refills: 0 | Status: DISCONTINUED | OUTPATIENT
Start: 2022-05-29 | End: 2022-05-30

## 2022-05-29 RX ORDER — AMLODIPINE BESYLATE 2.5 MG/1
10 TABLET ORAL EVERY 24 HOURS
Refills: 0 | Status: DISCONTINUED | OUTPATIENT
Start: 2022-05-29 | End: 2022-05-29

## 2022-05-29 RX ADMIN — Medication 10 MILLIGRAM(S): at 14:07

## 2022-05-29 RX ADMIN — PANTOPRAZOLE SODIUM 10 MG/HR: 20 TABLET, DELAYED RELEASE ORAL at 14:25

## 2022-05-29 RX ADMIN — Medication 0.25 MILLIGRAM(S): at 14:07

## 2022-05-29 RX ADMIN — Medication 10 MILLIGRAM(S): at 08:51

## 2022-05-29 RX ADMIN — CHLORHEXIDINE GLUCONATE 1 APPLICATION(S): 213 SOLUTION TOPICAL at 07:43

## 2022-05-29 RX ADMIN — Medication 10 MILLIGRAM(S): at 19:19

## 2022-05-29 RX ADMIN — PANTOPRAZOLE SODIUM 10 MG/HR: 20 TABLET, DELAYED RELEASE ORAL at 02:24

## 2022-05-29 NOTE — DIETITIAN INITIAL EVALUATION ADULT - ADD RECOMMEND
1. Adv diet as medically feasible: clears -> full liquids -> DASH TLC, soft low fiber PRN. Monitor tolerance, %PO. Add oral nutrition supplements PRN.   2. Monitor need for alternate means of nutrition if PO diet unable to be adv.   3. Monitor Skin, Wts. Pain/GI per team.  4. RD to remain available for additional nutrition interventions as needed.

## 2022-05-29 NOTE — CONSULT NOTE ADULT - ASSESSMENT
71M PMH HTN presents for intermittent rectal bleeding x1 week, admitted for symptomatic anemia to [facility] and hypertensive emergency. GI consulted for hematochezia.    #Hematochezia  Most likely LGIB given bright red blood and stable hemodynamics with intermittent bleeding over the past week. Last c-scope 6 years ago and was told nuts may bother him, suggestive of possible diverticulosis. Furthermore, clinical picture of painless hematochezia is suggestive of diverticular bleed, in an otherwise healthy male not on AC/ nsaids.     Recommendations:  - Maintain active T&S, large bore IV access  - Transfusion threshold per primary team    incomplete 71M PMH HTN presents for intermittent rectal bleeding x1 week, admitted for symptomatic anemia to . and hypertensive emergency with VAMSHI. GI consulted for hematochezia.    #Hematochezia  Most likely LGIB given bright red blood and stable hemodynamics with intermittent bleeding over the past week. Last c-scope 6 years ago and was told nuts may bother him, suggestive of possible diverticulosis. Furthermore, clinical picture of painless hematochezia is suggestive of diverticular bleed, in an otherwise healthy male not on AC/ nsaids.     Recommendations:  - Maintain active T&S, large bore IV access  - Transfusion threshold per primary team  - Clear diet  - Blood pressure control  - Start 4L golytely tomorrow morning for slow full day prep  - Plan for colonoscopy Tuesday am in endoscopy    case d/w svc attending and primary team    Dottie Smith MD  Gastroenterology Fellow, PGY -4   Pager [phone]  [phone]

## 2022-05-29 NOTE — DIETITIAN INITIAL EVALUATION ADULT - OTHER INFO
71 yr old man with a PMH of HTN, Pt stopped seeking medical care since start of COVID pandemic, stopped taking his norvasc, HCTZ, ASA. He was otherwise at his usual baseline health until last Thursday 5/19 when he had rectal bleeding with bowel movements, in toilet bowl not mixed with stools, with clots present. Now admitted for symptomatic anemia to 5.4 2/2 GIB, and hypertensive emergency (BP: 215/93).     Pt seen on [address]. Limited RD visit this AM however, pt resting. Spoke with RN/Pt. Pt NPO this AM, possible Pending GI w/u (Plan for colonoscopy tomorrow possibly). +Bright Red BM 5/29. Regular diet PTA; likes eggs, home fries, fish, chicken, steak. Reports appetite is "so so" PTA however unable provide further details. Wt loss Hx not provided either. No pain. Master 21, No edema, No pressure ulcers.   Please see below for nutritions recommendations.

## 2022-05-29 NOTE — DIETITIAN INITIAL EVALUATION ADULT - OTHER CALCULATIONS
5'8''  pound +-10%  Wt 166 pounds, BMI 25.4, %QNS115  current body wt used for energy calculations as pt falls within % IBW  adjusted for age and critical care setting; fluids per team d/t VAMSHI  - lower end prot needs at this time

## 2022-05-29 NOTE — DIETITIAN INITIAL EVALUATION ADULT - PERTINENT MEDS FT
MEDICATIONS  (STANDING):  amLODIPine   Tablet 10 milliGRAM(s) Oral every 24 hours  chlorhexidine 2% Cloths 1 Application(s) Topical <User Schedule>  hydrALAZINE 10 milliGRAM(s) Oral every 6 hours  pantoprazole Infusion 8 mG/Hr (10 mL/Hr) IV Continuous <Continuous>    MEDICATIONS  (PRN):  acetaminophen     Tablet .. 650 milliGRAM(s) Oral every 6 hours PRN Temp greater or equal to 38C (100.4F), Mild Pain (1 - 3)

## 2022-05-29 NOTE — DIETITIAN INITIAL EVALUATION ADULT - PERTINENT LABORATORY DATA
05-29    137  |  108  |  39<H>  ----------------------------<  98  5.2   |  17<L>  |  4.43<H>    Ca    8.2<L>      29 May 2022 05:25  Phos  4.0     05-29  Mg     2.1     05-29    TPro  5.7<L>  /  Alb  3.4  /  TBili  1.4<H>  /  DBili  x   /  AST  16  /  ALT  7<L>  /  AlkPhos  78  05-29  A1C with Estimated Average Glucose Result: 5.5 % (05-29-22 @ 05:25)  Lipids: NON ,

## 2022-05-29 NOTE — PROGRESS NOTE ADULT - CRITICAL CARE ATTENDING COMMENT
Acute blood loss anemia suspected to be secondary to diverticular bleed. Also with CKD and HTN; goal SBP in the 160s. Keep Hb >7. Plan for colonoscopy for Tuesday AM.

## 2022-05-29 NOTE — CONSULT NOTE ADULT - SUBJECTIVE AND OBJECTIVE BOX
GASTROENTEROLOGY CONSULT NOTE  HPI:  HPI:     Mr. Dudley is a 71 yr old man with a PMH of HTN who presents for GIB.    Pt stopped seeking medical care since start of COVID pandemic, stopped taking his norvasc, HCTZ, ASA. He was otherwise at his usual baseline health until last Thursday 5/19 when he had bright red rectal bleeding with bowel movements, in toilet bowl not mixed with stools, with clots present. The bleeding self resolved later the same day. He had another similar episode Tues. 5/31, prompting a PCP visit where lab work was performed. His doctor then called him on day of admission and urged him to present to ED for anemia. He has had weakness, fatigue, and decreased appetite. He has never had a GIB in past. Had a reportedly "normal" colonoscopy 6 yrs ago but was told to avoid nuts/ seeds. Not on any anti-inflammatory meds recently. Not currently taking aspirin. Does not take AC. Never smoker. Previously heavy drinker but currently drinks only occasionally No chest pain, palpitations, SOB, cough, abd pain, or n/v/d/c. No known family history of cancer.    In the ED:  Initial vital signs: T: 98 F, HR: 129, BP: 215/93, RR: 20, SpO2: 99% on RA  ED course:   Labs: WBC 12.4, Hgb 5.4 MCV 90.7, Plt 486. Trop high sens. (80.1). Bicarb 16. BUN/Cr 44/5.27. GFR 11. COVID neg.  CXR: unremarkable.  EKG: NSR. LVH. TWI I, aVL, V4-V6.  Medications: xanax 0.5 mg PO x1, norvasc 10mg PO x1, pantoprazole 80mg IV x1 then gtt. NS 1L bolus x1.   S/p 2u prbc with appropriate response from 5.4 -> 7.4 this am.    GI consulted for GIB.    Allergies    No Known Allergies    Intolerances      Home Medications:    MEDICATIONS:  MEDICATIONS  (STANDING):  amLODIPine   Tablet 10 milliGRAM(s) Oral every 24 hours  chlorhexidine 2% Cloths 1 Application(s) Topical <User Schedule>  hydrALAZINE 10 milliGRAM(s) Oral every 6 hours  pantoprazole Infusion 8 mG/Hr (10 mL/Hr) IV Continuous <Continuous>    MEDICATIONS  (PRN):  acetaminophen     Tablet .. 650 milliGRAM(s) Oral every 6 hours PRN Temp greater or equal to 38C (100.4F), Mild Pain (1 - 3)    PAST MEDICAL & SURGICAL HISTORY:  HTN (hypertension)      White coat syndrome with hypertension      History of bilateral knee replacement      History of left hip replacement  2014      History of arthroplasty of right shoulder        FAMILY HISTORY:    SOCIAL HISTORY:  Tobacco: [ ] Current, [ ] Former, [ ] Never; Pack Years:  Alcohol:  Illicit Drugs:    REVIEW OF SYSTEMS:  CONSTITUTIONAL: No weakness, fevers or chills  HEENT: No visual changes; No vertigo or throat pain   NECK: No pain or stiffness  RESPIRATORY: No cough, wheezing, hemoptysis; No shortness of breath  CARDIOVASCULAR: No chest pain or palpitations  GASTROINTESTINAL: As above.  GENITOURINARY: No dysuria, frequency or hematuria  NEUROLOGICAL: No numbness or weakness  SKIN: No itching, burning, rashes, or lesions   All other 10 review of systems is negative unless indicated above.    Vital Signs Last 24 Hrs  T(C): 37 (29 May 2022 10:00), Max: 37.1 (28 May 2022 20:54)  T(F): 98.6 (29 May 2022 10:00), Max: 98.8 (28 May 2022 20:54)  HR: 86 (29 May 2022 11:00) (61 - 129)  BP: 139/75 (29 May 2022 11:00) (127/58 - 220/88)  BP(mean): 101 (29 May 2022 11:00) (84 - 121)  RR: 27 (29 May 2022 11:00) (16 - 27)  SpO2: 100% (29 May 2022 11:00) (98% - 100%)    05-28 @ 07:01  -  05-29 @ 07:00  --------------------------------------------------------  IN: 600 mL / OUT: 250 mL / NET: 350 mL    05-29 @ 07:01 - 05-29 @ 11:52  --------------------------------------------------------  IN: 40 mL / OUT: 0 mL / NET: 40 mL        PHYSICAL EXAM:    General: in no acute distress  Eyes: Anicteric sclerae, moist conjunctivae  HENT: Moist mucous membranes  Neck: Trachea midline, supple  Lungs: Normal respiratory effort, no intercostal retractions  Cardiovascular: RRR  Abdomen: Soft, non-tender non-distended; No rebound or guarding  Extremities: Normal range of motion, No clubbing, cyanosis or edema  Neurological: Alert and oriented x3  Skin: Warm and dry. No obvious rash    LABS:                        7.4    10.46 )-----------( 387      ( 29 May 2022 05:25 )             23.3     05-29    137  |  108  |  39<H>  ----------------------------<  98  5.2   |  17<L>  |  4.43<H>    Ca    8.2<L>      29 May 2022 05:25  Phos  4.0     05-29  Mg     2.1     05-29    TPro  5.7<L>  /  Alb  3.4  /  TBili  1.4<H>  /  DBili  x   /  AST  16  /  ALT  7<L>  /  AlkPhos  78  05-29        PT/INR - ( 29 May 2022 05:25 )   PT: 10.9 sec;   INR: 0.92          PTT - ( 29 May 2022 05:25 )  PTT:22.9 sec    RADIOLOGY & ADDITIONAL STUDIES:     Reviewed

## 2022-05-29 NOTE — DIETITIAN INITIAL EVALUATION ADULT - NS FNS DIET ORDER
Diet, NPO:   Except Medications     Special Instructions for Nursing:  Except Medications (05-28-22 @ 23:35)

## 2022-05-29 NOTE — PROGRESS NOTE ADULT - SUBJECTIVE AND OBJECTIVE BOX
SCAR VERGARA, 71y, Male  MRN-[id number]  Patient is a 71y old  Male who presents with a chief complaint of EVAL     (29 May 2022 12:43)      OVERNIGHT EVENTS: Overnight, patient given 2 units pRBC.     SUBJECTIVE: Pt seen/evaluated at bedside. Pt doing well. Denies any abdominal pain/cramping. Pt stating he has never had  symptoms like this in the past. He states he has not been taking his hypertension medications and/or seen a doctor in the past three years. Pt denies dizziness, nausea/vomiting, weakness, etc.     12 Point ROS Negative unless noted otherwise above.  -------------------------------------------------------------------------------  VITAL SIGNS:  Vital Signs Last 24 Hrs  T(C): 36.8 (29 May 2022 13:00), Max: 37.1 (28 May 2022 20:54)  T(F): 98.3 (29 May 2022 13:00), Max: 98.8 (28 May 2022 20:54)  HR: 89 (29 May 2022 13:00) (61 - 129)  BP: 134/63 (29 May 2022 13:00) (127/58 - 220/88)  BP(mean): 91 (29 May 2022 13:00) (84 - 121)  RR: 22 (29 May 2022 13:00) (16 - 27)  SpO2: 100% (29 May 2022 13:00) (98% - 100%)  I&O's Summary    28 May 2022 07:01  -  29 May 2022 07:00  --------------------------------------------------------  IN: 600 mL / OUT: 250 mL / NET: 350 mL    29 May 2022 07:01  -  29 May 2022 13:37  --------------------------------------------------------  IN: 70 mL / OUT: 0 mL / NET: 70 mL        PHYSICAL EXAM:    General: NAD, sitting up in bed speaking in full sentences.   HEENT: NC/AT;  moist mucosal membranes.  Neck: supple  Cardiovascular: RRR, +S1/S2; NO M/R/G  Respiratory: CTA B/L; no W/R/R  Gastrointestinal: soft, NT/ND; +BSx4  Extremities: WWP; no edema or cyanosis  Vascular: 2+ radial, DP/PT pulses B/L  Neurological: AAOx3; no focal deficits    ALLERGIES:  Allergies    No Known Allergies    Intolerances        MEDICATIONS:  MEDICATIONS  (STANDING):  amLODIPine   Tablet 10 milliGRAM(s) Oral every 24 hours  chlorhexidine 2% Cloths 1 Application(s) Topical <User Schedule>  hydrALAZINE 10 milliGRAM(s) Oral every 6 hours  pantoprazole Infusion 8 mG/Hr (10 mL/Hr) IV Continuous <Continuous>    MEDICATIONS  (PRN):  acetaminophen     Tablet .. 650 milliGRAM(s) Oral every 6 hours PRN Temp greater or equal to 38C (100.4F), Mild Pain (1 - 3)      -------------------------------------------------------------------------------  LABS:                        7.4    10.46 )-----------( 387      ( 29 May 2022 05:25 )             23.3     05-29    137  |  108  |  39<H>  ----------------------------<  98  5.2   |  17<L>  |  4.43<H>    Ca    8.2<L>      29 May 2022 05:25  Phos  4.0     -  Mg     2.1         TPro  5.7<L>  /  Alb  3.4  /  TBili  1.4<H>  /  DBili  x   /  AST  16  /  ALT  7<L>  /  AlkPhos  78  05-29    LIVER FUNCTIONS - ( 29 May 2022 05:25 )  Alb: 3.4 g/dL / Pro: 5.7 g/dL / ALK PHOS: 78 U/L / ALT: 7 U/L / AST: 16 U/L / GGT: x           PT/INR - ( 29 May 2022 05:25 )   PT: 10.9 sec;   INR: 0.92          PTT - ( 29 May 2022 05:25 )  PTT:22.9 sec  Urinalysis Basic - ( 29 May 2022 00:14 )    Color: Yellow / Appearance: Clear / S.015 / pH: x  Gluc: x / Ketone: NEGATIVE  / Bili: Negative / Urobili: 0.2 E.U./dL   Blood: x / Protein: 100 mg/dL / Nitrite: NEGATIVE   Leuk Esterase: Moderate / RBC: < 5 /HPF / WBC Many /HPF   Sq Epi: x / Non Sq Epi: 0-5 /HPF / Bacteria: Present /HPF    CAPILLARY BLOOD GLUCOSE    COVID-19 PCR: NotDetec (28 May 2022 14:10)    RADIOLOGY & ADDITIONAL TESTS: Reviewed.

## 2022-05-29 NOTE — PROGRESS NOTE ADULT - ASSESSMENT
71M PMH HTN presents for GIB x1 week, admitted for symptomatic anemia to 5.4 2/2 GIB, and hypertensive emergency    NEURO:  No active issues. AAOx3.     CARDIOVASCULAR:  #Hypertensive emergency  Initial vital signs: HR: 129, BP: 215/93, RR: 20, SpO2: 99% on RA. EKG: NSR. LVH. TWI I, aVL, V4-V6. Pt not taking home medications of HCTZ 25 and Norvasc 10mg for the past 3 yrs. S/p 10mg norvasc in ED   - c/w Hydralazine 10mg PO q6hr   - c/w Norvasc 10mg PO q24hr  - avoid inducing hypotension i/s/o GIB and given asymptomatic, can treat as needed     #Troponinemia  Trop high sens. (80.1). No chest pain. No ST changes on EKG. TWI I, aVL, V4-V6 of unknown chronicity.  - repeat if any chest pain -- none that patient reported    PULM:  No active issues. Breathing comfortably on RA.     GI:  #LGIB    GIB x2 at home, BRBPR with bowel movements and associated weakness, fatigue, and decreased appetite. No prior GIB; normal colonoscopy 6 yrs ago; no known family history of cancer. Hgb 5.4 MCV 90.7 on admission. Pantoprazole 80mg IV x1 then gtt. NS 1L bolus x1. S/p 2 units pRBC with post-transfusion CBC 7.4.   - bright red bowel movement in AM. HD stable with no hypotension or tachycardia noted. Continue to monitor   - 1pm CBC repeat 6.3 -- ordered for additional 1 unit pRBC   - initially NPO -- transitioning to clear liquid diet per GI   - per GI, patient to undergo colonoscopy and endoscopy on 5/31 -- plan for prep in AM   - c/w PPI gtt  - maintain active T&S  - transfuse for Hgb<7    RENAL:  #VAMSHI vs. VAMSHI on CKD  Bicarb 16. BUN/Cr 44/5.27. GFR 11. Possibly prerenal 2/2 decrased PO, dehydration vs. intrinsic i/s/o elevated BPs. Per patient, no known hx of underlying CKD  - AM Cr downtrended -- continue to monitor    - f/u UA, urine Na, urine Cr  - good urine output   - consider renal u/s and renal consult if no improvement noted     #Low bicarb  Bicarb 16 on admission, likely metabolic acidosis 2/2 VAMSHI +- CKD and mild uremia BUN 44.   - lactate in AM WNL   - treat underlying VAMSHI  - cont. to monitor    ENDO:   No active issues.    ID:   #Leukocytosis  WBC 12.4, afebrile. CXR: unremarkable.  - cont. to monitor    #SIRS  Leukocytosis with tachycardia and tachypnea. No suspected infectious source. LIkely 2/2 anemia from GIB.  - cont. to monitor    HEME/ONC:  #Anemia  Normocytic anemia with last known baseline Hgb 14.2 in 2016. Likely 2/2 GIB.  - manage per GIB above  - f/u iron studies       #Thrombocytosis  Plt 486. Suspect reactive.  - continue to monitor    PREVENTIVE:   Fluids: s/p 1L NS in ED  Diet: NPO pending likely scope  DVT ppx: hold i/s/o active GIB  GI ppx: pantoprazole gtt         71M PMH HTN presents for GIB x1 week, admitted for symptomatic anemia to 5.4 2/2 GIB, and hypertensive emergency    NEURO:  No active issues. AAOx3.     CARDIOVASCULAR:  #Hypertensive emergency  Initial vital signs: HR: 129, BP: 215/93, RR: 20, SpO2: 99% on RA. EKG: NSR. LVH. TWI I, aVL, V4-V6. Pt not taking home medications of HCTZ 25 and Norvasc 10mg for the past 3 yrs. S/p 10mg norvasc in ED   - c/w Hydralazine 10mg PO q6hr   - Pt with well controlled BP at this time. Avoiding hypotension i/s/o GIB. holding Norvasc 10mg at this time.   - avoid inducing hypotension i/s/o GIB and given asymptomatic, can treat as needed     #Troponinemia  Trop high sens. (80.1). No chest pain. No ST changes on EKG. TWI I, aVL, V4-V6 of unknown chronicity.  - repeat if any chest pain -- none that patient reported    PULM:  No active issues. Breathing comfortably on RA.     GI:  #LGIB    GIB x2 at home, BRBPR with bowel movements and associated weakness, fatigue, and decreased appetite. No prior GIB; normal colonoscopy 6 yrs ago; no known family history of cancer. Hgb 5.4 MCV 90.7 on admission. Pantoprazole 80mg IV x1 then gtt. NS 1L bolus x1. S/p 2 units pRBC with post-transfusion CBC 7.4.   - bright red bowel movement in AM. HD stable with no hypotension or tachycardia noted. Continue to monitor   - 1pm CBC repeat 6.3 -- ordered for additional 1 unit pRBC   - initially NPO -- transitioning to clear liquid diet per GI   - per GI, patient to undergo colonoscopy and endoscopy on 5/31 -- plan for prep in AM   - c/w PPI gtt  - maintain active T&S  - transfuse for Hgb<7    RENAL:  #VAMSHI vs. VAMSHI on CKD  Bicarb 16. BUN/Cr 44/5.27. GFR 11. Possibly prerenal 2/2 decrased PO, dehydration vs. intrinsic i/s/o elevated BPs. Per patient, no known hx of underlying CKD  - AM Cr downtrended -- continue to monitor    - f/u UA, urine Na, urine Cr  - good urine output   - consider renal u/s and renal consult if no improvement noted     #Low bicarb  Bicarb 16 on admission, likely metabolic acidosis 2/2 VAMSHI +- CKD and mild uremia BUN 44.   - lactate in AM WNL   - treat underlying VAMSHI  - cont. to monitor    ENDO:   No active issues.    ID:   #Leukocytosis  WBC 12.4, afebrile. CXR: unremarkable.  - cont. to monitor    #SIRS  Leukocytosis with tachycardia and tachypnea. No suspected infectious source. LIkely 2/2 anemia from GIB.  - cont. to monitor    HEME/ONC:  #Anemia  Normocytic anemia with last known baseline Hgb 14.2 in 2016. Likely 2/2 GIB.  - manage per GIB above  - f/u iron studies       #Thrombocytosis  Plt 486. Suspect reactive.  - continue to monitor    PREVENTIVE:   Fluids: s/p 1L NS in ED  Diet: NPO pending likely scope  DVT ppx: hold i/s/o active GIB  GI ppx: pantoprazole gtt

## 2022-05-30 LAB
ALBUMIN SERPL ELPH-MCNC: 2.6 G/DL — LOW (ref 3.3–5)
ALP SERPL-CCNC: 63 U/L — SIGNIFICANT CHANGE UP (ref 40–120)
ALT FLD-CCNC: <5 U/L — LOW (ref 10–45)
ANION GAP SERPL CALC-SCNC: 9 MMOL/L — SIGNIFICANT CHANGE UP (ref 5–17)
AST SERPL-CCNC: 8 U/L — LOW (ref 10–40)
BASOPHILS # BLD AUTO: 0.06 K/UL — SIGNIFICANT CHANGE UP (ref 0–0.2)
BASOPHILS # BLD AUTO: 0.06 K/UL — SIGNIFICANT CHANGE UP (ref 0–0.2)
BASOPHILS NFR BLD AUTO: 0.5 % — SIGNIFICANT CHANGE UP (ref 0–2)
BASOPHILS NFR BLD AUTO: 0.5 % — SIGNIFICANT CHANGE UP (ref 0–2)
BILIRUB SERPL-MCNC: 0.8 MG/DL — SIGNIFICANT CHANGE UP (ref 0.2–1.2)
BUN SERPL-MCNC: 37 MG/DL — HIGH (ref 7–23)
CALCIUM SERPL-MCNC: 7.8 MG/DL — LOW (ref 8.4–10.5)
CHLORIDE SERPL-SCNC: 111 MMOL/L — HIGH (ref 96–108)
CO2 SERPL-SCNC: 16 MMOL/L — LOW (ref 22–31)
CREAT SERPL-MCNC: 4.6 MG/DL — HIGH (ref 0.5–1.3)
EGFR: 13 ML/MIN/1.73M2 — LOW
EOSINOPHIL # BLD AUTO: 0.63 K/UL — HIGH (ref 0–0.5)
EOSINOPHIL # BLD AUTO: 0.76 K/UL — HIGH (ref 0–0.5)
EOSINOPHIL NFR BLD AUTO: 4.8 % — SIGNIFICANT CHANGE UP (ref 0–6)
EOSINOPHIL NFR BLD AUTO: 6.4 % — HIGH (ref 0–6)
GLUCOSE SERPL-MCNC: 91 MG/DL — SIGNIFICANT CHANGE UP (ref 70–99)
HCT VFR BLD CALC: 20.6 % — CRITICAL LOW (ref 39–50)
HCT VFR BLD CALC: 21.8 % — LOW (ref 39–50)
HCT VFR BLD CALC: 23 % — LOW (ref 39–50)
HCT VFR BLD CALC: 23.4 % — LOW (ref 39–50)
HGB BLD-MCNC: 6.7 G/DL — CRITICAL LOW (ref 13–17)
HGB BLD-MCNC: 7.2 G/DL — LOW (ref 13–17)
HGB BLD-MCNC: 7.8 G/DL — LOW (ref 13–17)
HGB BLD-MCNC: 7.8 G/DL — LOW (ref 13–17)
IMM GRANULOCYTES NFR BLD AUTO: 0.4 % — SIGNIFICANT CHANGE UP (ref 0–1.5)
IMM GRANULOCYTES NFR BLD AUTO: 0.5 % — SIGNIFICANT CHANGE UP (ref 0–1.5)
LYMPHOCYTES # BLD AUTO: 1.49 K/UL — SIGNIFICANT CHANGE UP (ref 1–3.3)
LYMPHOCYTES # BLD AUTO: 1.83 K/UL — SIGNIFICANT CHANGE UP (ref 1–3.3)
LYMPHOCYTES # BLD AUTO: 11.2 % — LOW (ref 13–44)
LYMPHOCYTES # BLD AUTO: 15.3 % — SIGNIFICANT CHANGE UP (ref 13–44)
MAGNESIUM SERPL-MCNC: 1.9 MG/DL — SIGNIFICANT CHANGE UP (ref 1.6–2.6)
MCHC RBC-ENTMCNC: 29 PG — SIGNIFICANT CHANGE UP (ref 27–34)
MCHC RBC-ENTMCNC: 29.4 PG — SIGNIFICANT CHANGE UP (ref 27–34)
MCHC RBC-ENTMCNC: 29.7 PG — SIGNIFICANT CHANGE UP (ref 27–34)
MCHC RBC-ENTMCNC: 30.1 PG — SIGNIFICANT CHANGE UP (ref 27–34)
MCHC RBC-ENTMCNC: 32.5 GM/DL — SIGNIFICANT CHANGE UP (ref 32–36)
MCHC RBC-ENTMCNC: 33 GM/DL — SIGNIFICANT CHANGE UP (ref 32–36)
MCHC RBC-ENTMCNC: 33.3 GM/DL — SIGNIFICANT CHANGE UP (ref 32–36)
MCHC RBC-ENTMCNC: 33.9 GM/DL — SIGNIFICANT CHANGE UP (ref 32–36)
MCV RBC AUTO: 88.8 FL — SIGNIFICANT CHANGE UP (ref 80–100)
MCV RBC AUTO: 89 FL — SIGNIFICANT CHANGE UP (ref 80–100)
MCV RBC AUTO: 89 FL — SIGNIFICANT CHANGE UP (ref 80–100)
MCV RBC AUTO: 89.2 FL — SIGNIFICANT CHANGE UP (ref 80–100)
MONOCYTES # BLD AUTO: 0.95 K/UL — HIGH (ref 0–0.9)
MONOCYTES # BLD AUTO: 1.05 K/UL — HIGH (ref 0–0.9)
MONOCYTES NFR BLD AUTO: 7.2 % — SIGNIFICANT CHANGE UP (ref 2–14)
MONOCYTES NFR BLD AUTO: 8.8 % — SIGNIFICANT CHANGE UP (ref 2–14)
NEUTROPHILS # BLD AUTO: 10.07 K/UL — HIGH (ref 1.8–7.4)
NEUTROPHILS # BLD AUTO: 8.17 K/UL — HIGH (ref 1.8–7.4)
NEUTROPHILS NFR BLD AUTO: 68.5 % — SIGNIFICANT CHANGE UP (ref 43–77)
NEUTROPHILS NFR BLD AUTO: 75.9 % — SIGNIFICANT CHANGE UP (ref 43–77)
NRBC # BLD: 0 /100 WBCS — SIGNIFICANT CHANGE UP (ref 0–0)
PHOSPHATE SERPL-MCNC: 4.2 MG/DL — SIGNIFICANT CHANGE UP (ref 2.5–4.5)
PLATELET # BLD AUTO: 293 K/UL — SIGNIFICANT CHANGE UP (ref 150–400)
PLATELET # BLD AUTO: 300 K/UL — SIGNIFICANT CHANGE UP (ref 150–400)
PLATELET # BLD AUTO: 307 K/UL — SIGNIFICANT CHANGE UP (ref 150–400)
PLATELET # BLD AUTO: 313 K/UL — SIGNIFICANT CHANGE UP (ref 150–400)
POTASSIUM SERPL-MCNC: 5.2 MMOL/L — SIGNIFICANT CHANGE UP (ref 3.5–5.3)
POTASSIUM SERPL-SCNC: 5.2 MMOL/L — SIGNIFICANT CHANGE UP (ref 3.5–5.3)
PROT SERPL-MCNC: 4.5 G/DL — LOW (ref 6–8.3)
RBC # BLD: 2.31 M/UL — LOW (ref 4.2–5.8)
RBC # BLD: 2.45 M/UL — LOW (ref 4.2–5.8)
RBC # BLD: 2.59 M/UL — LOW (ref 4.2–5.8)
RBC # BLD: 2.63 M/UL — LOW (ref 4.2–5.8)
RBC # FLD: 14.6 % — HIGH (ref 10.3–14.5)
RBC # FLD: 14.8 % — HIGH (ref 10.3–14.5)
RBC # FLD: 14.9 % — HIGH (ref 10.3–14.5)
RBC # FLD: 15 % — HIGH (ref 10.3–14.5)
SODIUM SERPL-SCNC: 136 MMOL/L — SIGNIFICANT CHANGE UP (ref 135–145)
WBC # BLD: 11.9 K/UL — HIGH (ref 3.8–10.5)
WBC # BLD: 11.93 K/UL — HIGH (ref 3.8–10.5)
WBC # BLD: 13.25 K/UL — HIGH (ref 3.8–10.5)
WBC # BLD: 13.43 K/UL — HIGH (ref 3.8–10.5)
WBC # FLD AUTO: 11.9 K/UL — HIGH (ref 3.8–10.5)
WBC # FLD AUTO: 11.93 K/UL — HIGH (ref 3.8–10.5)
WBC # FLD AUTO: 13.25 K/UL — HIGH (ref 3.8–10.5)
WBC # FLD AUTO: 13.43 K/UL — HIGH (ref 3.8–10.5)

## 2022-05-30 PROCEDURE — 99233 SBSQ HOSP IP/OBS HIGH 50: CPT | Mod: GC

## 2022-05-30 RX ORDER — AMLODIPINE BESYLATE 2.5 MG/1
10 TABLET ORAL EVERY 24 HOURS
Refills: 0 | Status: DISCONTINUED | OUTPATIENT
Start: 2022-05-30 | End: 2022-06-02

## 2022-05-30 RX ORDER — SOD SULF/SODIUM/NAHCO3/KCL/PEG
4000 SOLUTION, RECONSTITUTED, ORAL ORAL ONCE
Refills: 0 | Status: DISCONTINUED | OUTPATIENT
Start: 2022-05-30 | End: 2022-05-30

## 2022-05-30 RX ORDER — HYDRALAZINE HCL 50 MG
15 TABLET ORAL ONCE
Refills: 0 | Status: COMPLETED | OUTPATIENT
Start: 2022-05-30 | End: 2022-05-30

## 2022-05-30 RX ORDER — HYDRALAZINE HCL 50 MG
25 TABLET ORAL EVERY 6 HOURS
Refills: 0 | Status: ACTIVE | OUTPATIENT
Start: 2022-05-30 | End: 2023-04-28

## 2022-05-30 RX ORDER — SOD SULF/SODIUM/NAHCO3/KCL/PEG
4000 SOLUTION, RECONSTITUTED, ORAL ORAL ONCE
Refills: 0 | Status: COMPLETED | OUTPATIENT
Start: 2022-05-30 | End: 2022-05-30

## 2022-05-30 RX ADMIN — Medication 25 MILLIGRAM(S): at 21:02

## 2022-05-30 RX ADMIN — Medication 10 MILLIGRAM(S): at 01:47

## 2022-05-30 RX ADMIN — Medication 15 MILLIGRAM(S): at 09:20

## 2022-05-30 RX ADMIN — Medication 10 MILLIGRAM(S): at 06:09

## 2022-05-30 RX ADMIN — PANTOPRAZOLE SODIUM 10 MG/HR: 20 TABLET, DELAYED RELEASE ORAL at 21:02

## 2022-05-30 RX ADMIN — CHLORHEXIDINE GLUCONATE 1 APPLICATION(S): 213 SOLUTION TOPICAL at 06:09

## 2022-05-30 RX ADMIN — PANTOPRAZOLE SODIUM 10 MG/HR: 20 TABLET, DELAYED RELEASE ORAL at 11:30

## 2022-05-30 RX ADMIN — Medication 25 MILLIGRAM(S): at 15:24

## 2022-05-30 RX ADMIN — AMLODIPINE BESYLATE 10 MILLIGRAM(S): 2.5 TABLET ORAL at 09:11

## 2022-05-30 RX ADMIN — Medication 4000 MILLILITER(S): at 15:24

## 2022-05-30 RX ADMIN — PANTOPRAZOLE SODIUM 10 MG/HR: 20 TABLET, DELAYED RELEASE ORAL at 00:50

## 2022-05-30 NOTE — PROGRESS NOTE ADULT - SUBJECTIVE AND OBJECTIVE BOX
URSULASCAR, 71y, Male  MRN-[id number]  Patient is a 71y old  Male who presents with a chief complaint of Acute Blood loss anemia due to gastrointestinal bleeding (30 May 2022 06:56)      OVERNIGHT EVENTS: 12am Hgb 6.7. Given 1 unit pRBC and post-transfusion CBC 7.8. Pt with bowel movement overnight however no blood noted. BPs remained stable    SUBJECTIVE: Pt seen/examined at bedside. Pt stating that he is doing well and denies any more bright bloody bowel movements this morning. Pt denying any abdominal cramping and/or pain.     12 Point ROS Negative unless noted otherwise above.  -------------------------------------------------------------------------------  VITAL SIGNS:  Vital Signs Last 24 Hrs  T(C): 36.8 (30 May 2022 10:00), Max: 37.1 (30 May 2022 01:08)  T(F): 98.3 (30 May 2022 10:00), Max: 98.7 (30 May 2022 01:08)  HR: 72 (30 May 2022 09:00) (60 - 117)  BP: 165/77 (30 May 2022 09:00) (133/67 - 189/84)  BP(mean): 110 (30 May 2022 09:00) (90 - 120)  RR: 23 (30 May 2022 09:00) (15 - 29)  SpO2: 100% (30 May 2022 09:00) (100% - 100%)  I&O's Summary    29 May 2022 07:01  -  30 May 2022 07:00  --------------------------------------------------------  IN: 680 mL / OUT: 725 mL / NET: -45 mL    30 May 2022 07:01  -  30 May 2022 10:02  --------------------------------------------------------  IN: 20 mL / OUT: 0 mL / NET: 20 mL        PHYSICAL EXAM:    General: sitting up in bed; speaking in full sentences; no distress noted   HEENT: NC/AT  Neck: supple, trachea midline  Cardiovascular: RRR, +S1/S2; NO M/R/G  Respiratory: CTA B/L; no W/R/R  Gastrointestinal: soft, NT/ND; +BSx4  Extremities: WWP; no edema or cyanosis  Vascular: 2+ radial, DP/PT pulses B/L  Neurological: AAOx3; no focal deficits    ALLERGIES:  Allergies    No Known Allergies    Intolerances        MEDICATIONS:  MEDICATIONS  (STANDING):  amLODIPine   Tablet 10 milliGRAM(s) Oral every 24 hours  chlorhexidine 2% Cloths 1 Application(s) Topical <User Schedule>  hydrALAZINE 25 milliGRAM(s) Oral every 6 hours  pantoprazole Infusion 8 mG/Hr (10 mL/Hr) IV Continuous <Continuous>  polyethylene glycol/electrolyte Solution. 4000 milliLiter(s) Oral once    MEDICATIONS  (PRN):  acetaminophen     Tablet .. 650 milliGRAM(s) Oral every 6 hours PRN Temp greater or equal to 38C (100.4F), Mild Pain (1 - 3)      -------------------------------------------------------------------------------  LABS:                        7.8    11.93 )-----------( 293      ( 30 May 2022 05:51 )             23.4     05-30    136  |  111<H>  |  37<H>  ----------------------------<  91  5.2   |  16<L>  |  4.60<H>    Ca    7.8<L>      30 May 2022 05:51  Phos  4.2     05-30  Mg     1.9     05-30    TPro  4.5<L>  /  Alb  2.6<L>  /  TBili  0.8  /  DBili  x   /  AST  8<L>  /  ALT  <5<L>  /  AlkPhos  63  05-30    LIVER FUNCTIONS - ( 30 May 2022 05:51 )  Alb: 2.6 g/dL / Pro: 4.5 g/dL / ALK PHOS: 63 U/L / ALT: <5 U/L / AST: 8 U/L / GGT: x           PT/INR - ( 29 May 2022 05:25 )   PT: 10.9 sec;   INR: 0.92          PTT - ( 29 May 2022 05:25 )  PTT:22.9 sec  Urinalysis Basic - ( 29 May 2022 00:14 )    Color: Yellow / Appearance: Clear / S.015 / pH: x  Gluc: x / Ketone: NEGATIVE  / Bili: Negative / Urobili: 0.2 E.U./dL   Blood: x / Protein: 100 mg/dL / Nitrite: NEGATIVE   Leuk Esterase: Moderate / RBC: < 5 /HPF / WBC Many /HPF   Sq Epi: x / Non Sq Epi: 0-5 /HPF / Bacteria: Present /HPF      CAPILLARY BLOOD GLUCOSE          Culture - Urine (collected 29 May 2022 08:23)  Source: Clean Catch Clean Catch (Midstream)  Preliminary Report (30 May 2022 09:34):    >100,000 CFU/ml Klebsiella pneumoniae .... mucoid strain    Susceptibility to follow.      COVID-19 PCR: NotDetec (28 May 2022 14:10)      RADIOLOGY & ADDITIONAL TESTS: Reviewed.

## 2022-05-30 NOTE — PROGRESS NOTE ADULT - ASSESSMENT
71M PMH HTN presents for intermittent rectal bleeding x1 week, admitted for symptomatic anemia to 5. and hypertensive emergency with VAMSHI. GI consulted for hematochezia.    #Hematochezia  Most likely LGIB given bright red blood and stable hemodynamics with intermittent bleeding over the past week. Last c-scope 6 years ago and was told nuts may bother him, suggestive of possible diverticulosis. Furthermore, clinical picture of painless hematochezia is suggestive of diverticular bleed, in an otherwise healthy male not on AC/ nsaids.     Recommendations:  - Maintain active T&S, large bore IV access  - Transfusion threshold per primary team  - Clear diet today; NPO pm  - Start 4L golytely today   - Plan for colonoscopy Tuesday am in endoscopy  - Blood pressure control    case d/w svc attending and primary team    Dottie Smith MD  Gastroenterology Fellow, PGY -4   Pager [phone]  [phone]

## 2022-05-30 NOTE — PROGRESS NOTE ADULT - SUBJECTIVE AND OBJECTIVE BOX
GASTROENTEROLOGY PROGRESS NOTE  Patient seen and examined at bedside.  HDS this am, although overnight bps elevated  hgb 7.8 s/p 1u yesterday evening from 6.7  No further bleeding overnight  Plan for colonoscopy tomorrow    PERTINENT REVIEW OF SYSTEMS:  CONSTITUTIONAL: No weakness, fevers or chills  HEENT: No visual changes; No vertigo or throat pain   GASTROINTESTINAL: As above.  NEUROLOGICAL: No numbness or weakness  SKIN: No itching, burning, rashes, or lesions     Allergies    No Known Allergies    Intolerances      MEDICATIONS:  MEDICATIONS  (STANDING):  amLODIPine   Tablet 10 milliGRAM(s) Oral every 24 hours  chlorhexidine 2% Cloths 1 Application(s) Topical <User Schedule>  hydrALAZINE 25 milliGRAM(s) Oral every 6 hours  pantoprazole Infusion 8 mG/Hr (10 mL/Hr) IV Continuous <Continuous>  polyethylene glycol/electrolyte Solution. 4000 milliLiter(s) Oral once    MEDICATIONS  (PRN):  acetaminophen     Tablet .. 650 milliGRAM(s) Oral every 6 hours PRN Temp greater or equal to 38C (100.4F), Mild Pain (1 - 3)    Vital Signs Last 24 Hrs  T(C): 36.8 (30 May 2022 10:00), Max: 37.1 (30 May 2022 01:08)  T(F): 98.3 (30 May 2022 10:00), Max: 98.7 (30 May 2022 01:08)  HR: 88 (30 May 2022 10:00) (60 - 117)  BP: 189/75 (30 May 2022 10:00) (133/67 - 189/84)  BP(mean): 108 (30 May 2022 10:00) (90 - 120)  RR: 23 (30 May 2022 10:00) (15 - 29)  SpO2: 100% (30 May 2022 10:00) (100% - 100%)     @ :  -   @ 07:00  --------------------------------------------------------  IN: 680 mL / OUT: 725 mL / NET: -45 mL     @ 07:  -   @ 10:34  --------------------------------------------------------  IN: 20 mL / OUT: 0 mL / NET: 20 mL      PHYSICAL EXAM:    General: in no acute distress  HEENT: MMM, conjunctiva and sclera clear  Gastrointestinal: Soft non-tender non-distended; No rebound or guarding  Skin: Warm and dry. No obvious rash    LABS:                        7.8    11.93 )-----------( 293      ( 30 May 2022 05:51 )             23.4         136  |  111<H>  |  37<H>  ----------------------------<  91  5.2   |  16<L>  |  4.60<H>    Ca    7.8<L>      30 May 2022 05:51  Phos  4.2       Mg     1.9         TPro  4.5<L>  /  Alb  2.6<L>  /  TBili  0.8  /  DBili  x   /  AST  8<L>  /  ALT  <5<L>  /  AlkPhos  63  30    PT/INR - ( 29 May 2022 05:25 )   PT: 10.9 sec;   INR: 0.92          PTT - ( 29 May 2022 05:25 )  PTT:22.9 sec      Urinalysis Basic - ( 29 May 2022 00:14 )    Color: Yellow / Appearance: Clear / S.015 / pH: x  Gluc: x / Ketone: NEGATIVE  / Bili: Negative / Urobili: 0.2 E.U./dL   Blood: x / Protein: 100 mg/dL / Nitrite: NEGATIVE   Leuk Esterase: Moderate / RBC: < 5 /HPF / WBC Many /HPF   Sq Epi: x / Non Sq Epi: 0-5 /HPF / Bacteria: Present /HPF                Culture - Urine (collected 29 May 2022 08:23)  Source: Clean Catch Clean Catch (Midstream)  Preliminary Report (30 May 2022 09:34):    >100,000 CFU/ml Klebsiella pneumoniae .... mucoid strain    Susceptibility to follow.      RADIOLOGY & ADDITIONAL STUDIES:  Reviewed

## 2022-05-30 NOTE — PROGRESS NOTE ADULT - ASSESSMENT
71M PMH HTN presents for GIB x1 week, admitted for symptomatic anemia to 5.4 2/2 GIB, and hypertensive emergency    NEURO:  No active issues. AAOx3.     CARDIOVASCULAR:  #Hypertensive emergency -- RESOLVING   Initial vital signs: HR: 129, BP: 215/93, RR: 20, SpO2: 99% on RA. EKG: NSR. LVH. TWI I, aVL, V4-V6. Pt not taking home medications of HCTZ 25 and Norvasc 10mg for the past 3 yrs. S/p 10mg norvasc in ED   - Hydralazine increased to 25mg PO q6hr   - resume norvasc 10mg qd   - Pt with well controlled BP at this time. Avoiding hypotension i/s/o GIB.   - avoid inducing hypotension i/s/o GIB and given asymptomatic, can treat as needed     #Troponinemia - RESOLVED   Trop high sens. (80.1). No chest pain. No ST changes on EKG. TWI I, aVL, V4-V6 of unknown chronicity.  - repeat if any chest pain -- none that patient reported    PULM:  No active issues. Breathing comfortably on RA.     GI:  #LGIB    GIB x2 at home, BRBPR with bowel movements and associated weakness, fatigue, and decreased appetite. No prior GIB; normal colonoscopy 6 yrs ago; no known family history of cancer. Hgb 5.4 MCV 90.7 on admission. Pantoprazole 80mg IV x1 then gtt. NS 1L bolus x1. S/p 2 units pRBC with post-transfusion CBC 7.4. Additional 2 units given on 5/29.   - f/u 4pm CBC   - pt tolerating clear liquid diet. Pt to undergo colonoscopy on 5/31. Golyelty prep to begin this evening   - Per GI, bleed etiology likely diverticular and mainstay of tx will be HTN control   - NPO after midnight tonight   - c/w PPI gtt  - maintain active T&S  - transfuse for Hgb<7    RENAL:  #VAMSHI vs. VAMSHI on CKD  Bicarb 16. BUN/Cr 44/5.27. GFR 11. Possibly prerenal 2/2 decrased PO, dehydration vs. intrinsic i/s/o elevated BPs. Per patient, no known hx of underlying CKD  - AM Cr downtrended -- continue to monitor    - good urine output   - consider renal u/s and renal consult if no improvement noted     #Low bicarb  Bicarb 16 on admission, likely metabolic acidosis 2/2 VAMSHI +- CKD and mild uremia BUN 44.   - treat underlying VAMSHI  - cont. to monitor    ENDO:   No active issues.    ID:     #UTI with Klebsiella Pneumoniae  UCx positive for klebsiella pneumoniae. Pt asymptomatic at this time  - no treatment indicated at this time given that patient is asymptomatic.   - continue to monitor    #Leukocytosis -- likley reactive   WBC 12.4, afebrile. CXR: unremarkable.  - cont. to monitor    #SIRS   Leukocytosis with tachycardia and tachypnea. No suspected infectious source. LIkely 2/2 anemia from GIB.  - cont. to monitor    HEME/ONC:  #Anemia  Normocytic anemia with last known baseline Hgb 14.2 in 2016. Likely 2/2 GIB.  - manage per GIB above  - f/u iron studies       #Thrombocytosis  Plt 486. Suspect reactive.  - continue to monitor    PREVENTIVE:   Fluids: s/p 1L NS in ED  Diet: NPO pending likely scope  DVT ppx: hold i/s/o active GIB  GI ppx: pantoprazole gtt

## 2022-05-31 ENCOUNTER — TRANSCRIPTION ENCOUNTER (OUTPATIENT)
Age: 72
End: 2022-05-31

## 2022-05-31 DIAGNOSIS — E87.2 ACIDOSIS: ICD-10-CM

## 2022-05-31 DIAGNOSIS — N18.9 CHRONIC KIDNEY DISEASE, UNSPECIFIED: ICD-10-CM

## 2022-05-31 DIAGNOSIS — Z29.9 ENCOUNTER FOR PROPHYLACTIC MEASURES, UNSPECIFIED: ICD-10-CM

## 2022-05-31 DIAGNOSIS — D62 ACUTE POSTHEMORRHAGIC ANEMIA: ICD-10-CM

## 2022-05-31 DIAGNOSIS — I10 ESSENTIAL (PRIMARY) HYPERTENSION: ICD-10-CM

## 2022-05-31 DIAGNOSIS — K92.2 GASTROINTESTINAL HEMORRHAGE, UNSPECIFIED: ICD-10-CM

## 2022-05-31 DIAGNOSIS — D72.829 ELEVATED WHITE BLOOD CELL COUNT, UNSPECIFIED: ICD-10-CM

## 2022-05-31 DIAGNOSIS — N17.9 ACUTE KIDNEY FAILURE, UNSPECIFIED: ICD-10-CM

## 2022-05-31 LAB
-  AMPICILLIN/SULBACTAM: SIGNIFICANT CHANGE UP
-  AMPICILLIN: SIGNIFICANT CHANGE UP
-  CEFAZOLIN: SIGNIFICANT CHANGE UP
-  CEFTRIAXONE: SIGNIFICANT CHANGE UP
-  CIPROFLOXACIN: SIGNIFICANT CHANGE UP
-  ERTAPENEM: SIGNIFICANT CHANGE UP
-  GENTAMICIN: SIGNIFICANT CHANGE UP
-  NITROFURANTOIN: SIGNIFICANT CHANGE UP
-  PIPERACILLIN/TAZOBACTAM: SIGNIFICANT CHANGE UP
-  TOBRAMYCIN: SIGNIFICANT CHANGE UP
-  TRIMETHOPRIM/SULFAMETHOXAZOLE: SIGNIFICANT CHANGE UP
ALBUMIN SERPL ELPH-MCNC: 3 G/DL — LOW (ref 3.3–5)
ALP SERPL-CCNC: 76 U/L — SIGNIFICANT CHANGE UP (ref 40–120)
ALT FLD-CCNC: 6 U/L — LOW (ref 10–45)
ANION GAP SERPL CALC-SCNC: 13 MMOL/L — SIGNIFICANT CHANGE UP (ref 5–17)
APTT BLD: 24.9 SEC — LOW (ref 27.5–35.5)
AST SERPL-CCNC: 8 U/L — LOW (ref 10–40)
BASOPHILS # BLD AUTO: 0.08 K/UL — SIGNIFICANT CHANGE UP (ref 0–0.2)
BASOPHILS NFR BLD AUTO: 0.6 % — SIGNIFICANT CHANGE UP (ref 0–2)
BILIRUB SERPL-MCNC: 0.5 MG/DL — SIGNIFICANT CHANGE UP (ref 0.2–1.2)
BLD GP AB SCN SERPL QL: NEGATIVE — SIGNIFICANT CHANGE UP
BUN SERPL-MCNC: 33 MG/DL — HIGH (ref 7–23)
CALCIUM SERPL-MCNC: 8 MG/DL — LOW (ref 8.4–10.5)
CHLORIDE SERPL-SCNC: 110 MMOL/L — HIGH (ref 96–108)
CO2 SERPL-SCNC: 16 MMOL/L — LOW (ref 22–31)
CREAT SERPL-MCNC: 4.37 MG/DL — HIGH (ref 0.5–1.3)
CULTURE RESULTS: SIGNIFICANT CHANGE UP
EGFR: 14 ML/MIN/1.73M2 — LOW
EOSINOPHIL # BLD AUTO: 0.56 K/UL — HIGH (ref 0–0.5)
EOSINOPHIL NFR BLD AUTO: 4.1 % — SIGNIFICANT CHANGE UP (ref 0–6)
GLUCOSE SERPL-MCNC: 98 MG/DL — SIGNIFICANT CHANGE UP (ref 70–99)
HCT VFR BLD CALC: 23 % — LOW (ref 39–50)
HGB BLD-MCNC: 7.7 G/DL — LOW (ref 13–17)
IMM GRANULOCYTES NFR BLD AUTO: 0.4 % — SIGNIFICANT CHANGE UP (ref 0–1.5)
INR BLD: 0.96 — SIGNIFICANT CHANGE UP (ref 0.88–1.16)
LYMPHOCYTES # BLD AUTO: 1.33 K/UL — SIGNIFICANT CHANGE UP (ref 1–3.3)
LYMPHOCYTES # BLD AUTO: 9.9 % — LOW (ref 13–44)
MAGNESIUM SERPL-MCNC: 1.7 MG/DL — SIGNIFICANT CHANGE UP (ref 1.6–2.6)
MCHC RBC-ENTMCNC: 29.7 PG — SIGNIFICANT CHANGE UP (ref 27–34)
MCHC RBC-ENTMCNC: 33.5 GM/DL — SIGNIFICANT CHANGE UP (ref 32–36)
MCV RBC AUTO: 88.8 FL — SIGNIFICANT CHANGE UP (ref 80–100)
METHOD TYPE: SIGNIFICANT CHANGE UP
MONOCYTES # BLD AUTO: 0.96 K/UL — HIGH (ref 0–0.9)
MONOCYTES NFR BLD AUTO: 7.1 % — SIGNIFICANT CHANGE UP (ref 2–14)
NEUTROPHILS # BLD AUTO: 10.51 K/UL — HIGH (ref 1.8–7.4)
NEUTROPHILS NFR BLD AUTO: 77.9 % — HIGH (ref 43–77)
NRBC # BLD: 0 /100 WBCS — SIGNIFICANT CHANGE UP (ref 0–0)
ORGANISM # SPEC MICROSCOPIC CNT: SIGNIFICANT CHANGE UP
ORGANISM # SPEC MICROSCOPIC CNT: SIGNIFICANT CHANGE UP
PHOSPHATE SERPL-MCNC: 3.4 MG/DL — SIGNIFICANT CHANGE UP (ref 2.5–4.5)
PLATELET # BLD AUTO: 339 K/UL — SIGNIFICANT CHANGE UP (ref 150–400)
POTASSIUM SERPL-MCNC: 5 MMOL/L — SIGNIFICANT CHANGE UP (ref 3.5–5.3)
POTASSIUM SERPL-SCNC: 5 MMOL/L — SIGNIFICANT CHANGE UP (ref 3.5–5.3)
PROT SERPL-MCNC: 5 G/DL — LOW (ref 6–8.3)
PROTHROM AB SERPL-ACNC: 11.4 SEC — SIGNIFICANT CHANGE UP (ref 10.5–13.4)
RBC # BLD: 2.59 M/UL — LOW (ref 4.2–5.8)
RBC # FLD: 15 % — HIGH (ref 10.3–14.5)
RH IG SCN BLD-IMP: POSITIVE — SIGNIFICANT CHANGE UP
SODIUM SERPL-SCNC: 139 MMOL/L — SIGNIFICANT CHANGE UP (ref 135–145)
SPECIMEN SOURCE: SIGNIFICANT CHANGE UP
WBC # BLD: 13.5 K/UL — HIGH (ref 3.8–10.5)
WBC # FLD AUTO: 13.5 K/UL — HIGH (ref 3.8–10.5)

## 2022-05-31 PROCEDURE — 99233 SBSQ HOSP IP/OBS HIGH 50: CPT | Mod: GC

## 2022-05-31 PROCEDURE — 99223 1ST HOSP IP/OBS HIGH 75: CPT

## 2022-05-31 RX ORDER — SODIUM BICARBONATE 1 MEQ/ML
650 SYRINGE (ML) INTRAVENOUS EVERY 8 HOURS
Refills: 0 | Status: DISCONTINUED | OUTPATIENT
Start: 2022-05-31 | End: 2022-06-02

## 2022-05-31 RX ORDER — ALPRAZOLAM 0.25 MG
0.5 TABLET ORAL
Refills: 0 | Status: DISCONTINUED | OUTPATIENT
Start: 2022-05-31 | End: 2022-06-02

## 2022-05-31 RX ORDER — MAGNESIUM SULFATE 500 MG/ML
1 VIAL (ML) INJECTION ONCE
Refills: 0 | Status: COMPLETED | OUTPATIENT
Start: 2022-05-31 | End: 2022-05-31

## 2022-05-31 RX ADMIN — Medication 650 MILLIGRAM(S): at 23:00

## 2022-05-31 RX ADMIN — Medication 25 MILLIGRAM(S): at 07:18

## 2022-05-31 RX ADMIN — Medication 25 MILLIGRAM(S): at 15:01

## 2022-05-31 RX ADMIN — Medication 0.25 MILLIGRAM(S): at 06:16

## 2022-05-31 RX ADMIN — CHLORHEXIDINE GLUCONATE 1 APPLICATION(S): 213 SOLUTION TOPICAL at 06:16

## 2022-05-31 RX ADMIN — PANTOPRAZOLE SODIUM 10 MG/HR: 20 TABLET, DELAYED RELEASE ORAL at 06:16

## 2022-05-31 RX ADMIN — Medication 650 MILLIGRAM(S): at 16:22

## 2022-05-31 RX ADMIN — AMLODIPINE BESYLATE 10 MILLIGRAM(S): 2.5 TABLET ORAL at 06:16

## 2022-05-31 RX ADMIN — Medication 25 MILLIGRAM(S): at 02:52

## 2022-05-31 RX ADMIN — Medication 100 GRAM(S): at 08:55

## 2022-05-31 RX ADMIN — Medication 25 MILLIGRAM(S): at 21:29

## 2022-05-31 NOTE — PROGRESS NOTE ADULT - PROBLEM SELECTOR PLAN 3
Baseline in 2016 - 2018: 1.5 to 1.9  Cr 5.27 ->4.37 since admission  Likely VAMSHI 2/2 pre-renal i/s/o GIB on superimposed CKD    Plan:  -Treat GIB as above  -Avoid nephrotoxins Baseline in 2016 - 2018: 1.5 to 1.9  Cr 5.27 ->4.37 since admission  Likely VAMSHI 2/2 pre-renal ATN i/s/o GIB on superimposed CKD  U/S showed medical renal disease     Plan:  -Treat GIB as above  -Avoid nephrotoxins

## 2022-05-31 NOTE — PROGRESS NOTE ADULT - ASSESSMENT
71M PMH HTN presented for GIB x1 week, admitted for symptomatic anemia to 5.4 2/2 GIB, and hypertensive emergency.

## 2022-05-31 NOTE — DISCHARGE NOTE PROVIDER - CARE PROVIDER_API CALL
Patricio Lundberg)  Internal Medicine  [address] [address]  Phone: [phone]  Fax: [phone]  Established Patient  Scheduled Appointment: 06/10/2022 03:30 PM   Patricio Lundberg)  Internal Medicine  [address] [address]  Phone: [phone]  Fax: [phone]  Established Patient  Scheduled Appointment: 06/10/2022 03:30 PM    Aster Schmidt (DO)  Gastroenterology; Public Health  Preventative Health  [address] [address]  Phone: [phone]  Fax: [phone]  Follow Up Time: 2 weeks

## 2022-05-31 NOTE — PROGRESS NOTE ADULT - ASSESSMENT
71M PMH HTN presents for intermittent rectal bleeding x1 week, admitted for symptomatic anemia to 5. and hypertensive emergency with VAMSHI. GI consulted for hematochezia.    #Hematochezia  Most likely LGIB given bright red blood and stable hemodynamics with intermittent bleeding over the past week. Last c-scope 6 years ago and was told nuts may bother him, suggestive of possible diverticulosis. Furthermore, clinical picture of painless hematochezia is suggestive of diverticular bleed, in an otherwise healthy male not on AC/ nsaids.     Recommendations:  - Maintain active T&S, large bore IV access  - Transfusion threshold per primary team  - Blood pressure control  - Plan for colonoscopy, exact timing to be determined by availability in endoscopy    case d/w svc attending and primary team    Dottie Smith MD  Gastroenterology Fellow, PGY -4   Pager [phone]  [phone] 71M PMH HTN presents for intermittent rectal bleeding x1 week, admitted for symptomatic anemia to 5. and hypertensive emergency with VAMSHI. GI consulted for hematochezia.    #Hematochezia  Most likely LGIB given bright red blood and stable hemodynamics with intermittent bleeding over the past week. Last c-scope 6 years ago and was told nuts may bother him, suggestive of possible diverticulosis. Furthermore, clinical picture of painless hematochezia is suggestive of diverticular bleed, in an otherwise healthy male not on AC/ nsaids.     Recommendations:  - Maintain active T&S, large bore IV access  - Transfusion threshold per primary team  - Blood pressure control  - Plan for colonoscopy Thursday, - ok for regular diet today and transition to clear diet Weds    case d/w svc attending and primary team    Dottie Smith MD  Gastroenterology Fellow, PGY -4   Pager [phone]  [phone]

## 2022-05-31 NOTE — DISCHARGE NOTE PROVIDER - PROVIDER TOKENS
PROVIDER:[TOKEN:[[id number]],SCHEDULEDAPPT:[06/10/2022],SCHEDULEDAPPTTIME:[03:30 PM],ESTABLISHEDPATIENT:[T]] PROVIDER:[TOKEN:[[id number]],SCHEDULEDAPPT:[06/10/2022],SCHEDULEDAPPTTIME:[03:30 PM],ESTABLISHEDPATIENT:[T]],PROVIDER:[TOKEN:[[id number]],FOLLOWUP:[2 weeks]]

## 2022-05-31 NOTE — PROGRESS NOTE ADULT - ASSESSMENT
71M PMH HTN presents for GIB x1 week, admitted for symptomatic anemia to 5.4 2/2 GIB, and hypertensive emergency.     NEURO:  No active issues. AAOx3.     CARDIOVASCULAR:  #Hypertensive emergency -- RESOLVING   Initial vital signs: HR: 129, BP: 215/93, RR: 20, SpO2: 99% on RA. EKG: NSR. LVH. TWI I, aVL, V4-V6. Pt not taking home medications of HCTZ 25 and Norvasc 10mg for the past 3 yrs. S/p 10mg norvasc in ED.   - C/w Hydralazine 25mg PO q6hr -- can uptitrate dose  s/p colonoscopy. Currently avoiding inducing hypotension.   - c/w norvasc 10mg qd   - Pt with well controlled BP at this time. Avoiding hypotension i/s/o GIB. SBPs 140s-150s this morning.     #Troponinemia - RESOLVED   Trop high sens. (80.1). No chest pain. No ST changes on EKG. TWI I, aVL, V4-V6 of unknown chronicity.  - repeat if any chest pain -- none that patient reported    PULM:  No active issues. Breathing comfortably on RA.     GI:  #LGIB    GIB x2 at home, BRBPR with bowel movements and associated weakness, fatigue, and decreased appetite. No prior GIB; normal colonoscopy 6 yrs ago; no known family history of cancer. Hgb 5.4 MCV 90.7 on admission. Pantoprazole 80mg IV x1 then gtt. NS 1L bolus x1. S/p 2 units pRBC with post-transfusion CBC 7.4. Additional 2 units given on 5/29.   - Per GI, bleed etiology likely diverticular and mainstay of tx will be HTN control   - hemoglobin stable with no additional pRBC units transfused in past 24 hours.   - pt tolerating clear liquid diet. Pt to undergo colonoscopy today. Golytely prep completed. NPO at this time   - c/w PPI gtt -- titrate per GI recommendations   - maintain active T&S  - transfuse for Hgb<7    RENAL:  #VAMSHI vs. VAMSHI on CKD  Bicarb 16. BUN/Cr 44/5.27. GFR 11. Possibly prerenal 2/2 decrased PO, dehydration vs. intrinsic i/s/o elevated BPs. Per patient, no known hx of underlying CKD  - AM Cr downtrended -- continue to monitor    - good urine output   - consider renal u/s and renal consult if no improvement noted     #Low bicarb  Bicarb 16 on admission, likely metabolic acidosis 2/2 VAMSHI +- CKD and mild uremia BUN 44.   - treat underlying VAMSHI  - cont. to monitor    ENDO:   No active issues.    ID:     #UTI with Klebsiella Pneumoniae  UCx positive for klebsiella pneumoniae. Pt asymptomatic at this time  - no treatment indicated at this time given that patient is asymptomatic.   - continue to monitor    #Leukocytosis -- likley reactive   WBC 12.4, afebrile. CXR: unremarkable.  - cont. to monitor    #SIRS   Leukocytosis with tachycardia and tachypnea. No suspected infectious source. LIkely 2/2 anemia from GIB.  - cont. to monitor    HEME/ONC:  #Anemia  Normocytic anemia with last known baseline Hgb 14.2 in 2016. Likely 2/2 GIB.   - manage per GIB above    #Thrombocytosis  Plt 486. Suspect reactive.  - continue to monitor    PREVENTIVE:   Fluids: s/p 1L NS in ED  Diet: NPO pending likely scope  DVT ppx: hold i/s/o active GIB  GI ppx: pantoprazole gtt

## 2022-05-31 NOTE — DISCHARGE NOTE PROVIDER - NSDCCPCAREPLAN_GEN_ALL_CORE_FT
PRINCIPAL DISCHARGE DIAGNOSIS  Diagnosis: Lower GI bleed  Assessment and Plan of Treatment: You were admitted with rectal bleeding which caused severe anemia (low blood hemoglobin levels). We gave you blood transfusions and medications to stop the bleeding. We did a colonoscopy which showed _______. You bleeding is likely caused by _______________      SECONDARY DISCHARGE DIAGNOSES  Diagnosis: Acute renal failure  Assessment and Plan of Treatment:      PRINCIPAL DISCHARGE DIAGNOSIS  Diagnosis: Lower GI bleed  Assessment and Plan of Treatment: You were admitted with rectal bleeding which caused severe anemia (low blood hemoglobin levels). We gave you blood transfusions and medications to stop the bleeding. We did a colonoscopy which showed _______. You bleeding is likely caused by high blood pressure which are rupturing the small blood vessels of the colon/lower gastrointestinal tract which will has now resolved. Please see you primary  on 6/10 at 3:30 PM to follow up regarding blood presure medication control. Dr. lozada should also monitor your creatinine as your high blood pressure has likely also worsened your kidney function.      SECONDARY DISCHARGE DIAGNOSES  Diagnosis: Acute renal failure  Assessment and Plan of Treatment:      PRINCIPAL DISCHARGE DIAGNOSIS  Diagnosis: Lower GI bleed  Assessment and Plan of Treatment: You were admitted with rectal bleeding which caused severe anemia (low blood hemoglobin levels). We gave you blood transfusions and medications to stop the bleeding. We did a colonoscopy which showed _______. You bleeding is likely caused by high blood pressure which are rupturing the small blood vessels of the colon/lower gastrointestinal tract which will has now resolved. Please see you primary  on 6/10 at 3:30 PM to follow up regarding blood presure medication control. Dr. lozada should also monitor your creatinine as your high blood pressure has likely also worsened your kidney function.      SECONDARY DISCHARGE DIAGNOSES  Diagnosis: HTN (hypertension)  Assessment and Plan of Treatment: Hypertension is the medical term for high blood pressure. Blood pressure refers to the pressure that blood applies to the inner walls of the arteries. Arteries carry blood from the heart to other organs and parts of the body. Untreated high blood pressure increases the strain on the heart and arteries, eventually causing organ damage. High blood pressure increases the risk of heart failure, heart attack (myocardial infarction), stroke, and kidney failure. High blood pressure does not usually cause any symptoms. Treatment of hypertension usually begins with lifestyle changes. Making these lifestyle changes involves little or no risk. Recommended changes often include reducing the amount of salt in your diet, losing weight if you are overweight or obese, avoiding drinking too much alcohol, stopping smoking and exercising at least 30 minutes per day most days of the week. If you are prescribed medication for your hypertension it is important to take these as prescribed to prevent the possible complications of uncontrolled hypertension.      Diagnosis: Acute renal failure  Assessment and Plan of Treatment: Your high blood pressure has damaged your kidneys. This means that your kidneys are unable to hold onto a compound called bicarbonate. To rectify the low levels of bicarbonate in your bloodstream caused by the chronic kidney damage you were started on sodium bicarb 650 q8h. Please continue this medication at time of discharge.     PRINCIPAL DISCHARGE DIAGNOSIS  Diagnosis: Lower GI bleed  Assessment and Plan of Treatment: You were admitted with rectal bleeding which caused severe anemia (low blood hemoglobin levels). We gave you blood transfusions and medications to stop the bleeding. We did a colonoscopy which showed you had some diverticulosis (outpouchings of your colon that happen overtime as the colon wall weaknes with age) and internal hemmorhoids, You bleeding is likely caused by high blood pressure which are rupturing the small blood vessels of the colon/lower gastrointestinal tract which will has now resolved. To improve colon health please ensure you have a lot of fiber in your diet. Endoscopy was also done that showed mild inflammation of your stomach and duodenum. Please see you primary  on 6/10 at 3:30 PM to follow up regarding blood presure medication control. Dr. lozada should also monitor your creatinine as your high blood pressure has likely also worsened your kidney function.      SECONDARY DISCHARGE DIAGNOSES  Diagnosis: Acute renal failure  Assessment and Plan of Treatment: Your high blood pressure has damaged your kidneys. This means that your kidneys are unable to hold onto a compound called bicarbonate. To rectify the low levels of bicarbonate in your bloodstream caused by the chronic kidney damage you were started on sodium bicarb 650 q8h. Please continue this medication at time of discharge.    Diagnosis: HTN (hypertension)  Assessment and Plan of Treatment: Hypertension is the medical term for high blood pressure. Blood pressure refers to the pressure that blood applies to the inner walls of the arteries. Arteries carry blood from the heart to other organs and parts of the body. Untreated high blood pressure increases the strain on the heart and arteries, eventually causing organ damage. High blood pressure increases the risk of heart failure, heart attack (myocardial infarction), stroke, and kidney failure. High blood pressure does not usually cause any symptoms. Treatment of hypertension usually begins with lifestyle changes. Making these lifestyle changes involves little or no risk. Recommended changes often include reducing the amount of salt in your diet, losing weight if you are overweight or obese, avoiding drinking too much alcohol, stopping smoking and exercising at least 30 minutes per day most days of the week. Please take norvasc 10 mg once  day. Continue to take hydralazine 75 mg every 8 hours. Please follow up with your primary care doctor so he can make further adjustments as needed.

## 2022-05-31 NOTE — DISCHARGE NOTE PROVIDER - CARE PROVIDERS DIRECT ADDRESSES
,[email or web address]

## 2022-05-31 NOTE — PROGRESS NOTE ADULT - SUBJECTIVE AND OBJECTIVE BOX
**TRANSFER ACCEPTANCE MICU to [facility]**    Hospital Course:    72 yo male with PMHx of HTN (not on medications for past 3 years) presents with rectal bleeding. Pt stopped seeking medical care since start of COVID pandemic, stopped taking his norvasc, HCTZ, ASA. He was otherwise at his usual baseline health until last Thursday 5/19 when he had rectal bleeding with bowel movements, in toilet bowl not mixed with stools, with clots present. He saw PCP where lab work was performed. His doctor then called him on day of admission and urged him to present to ED for anemia. Presented with HTN to 215/93 and Hb 5.4. Pt admitted to ICU. Pt received 3 units pRBC initially and then required additional pRBC on 5/29 for a total of 4 units. Patient hgb has been stable over past 24 hours. Patient stared on PO hydralazine which was uptitrated to 25mg q6hr and resumed on Norvasc 10mg qd. Per GI, bleeding is likely diverticular in etiology and blood pressure control is most important at preventing future bleeds. Patient undergoing colonoscopy on 5/31 with GI and completed prep on 5/30. Patient urine culture growing klebsiella however pt asymptomatic therefore not being treated. Hgb stable. PPI discontinued per GI. Stepped down to [facility] 5/31.    SUBJECTIVE / INTERVAL HPI: Patient seen and examined at bedside. Patient is anxious about upcoming endoscopy. No melena overnight. Pt had bowel prep for endoscopy and reports no blood in the bowel movements overnight.    ROS: negative unless otherwise stated above.    VITAL SIGNS:  Vital Signs Last 24 Hrs  T(C): 36.8 (31 May 2022 09:00), Max: 37.1 (31 May 2022 02:05)  T(F): 98.2 (31 May 2022 09:00), Max: 98.8 (31 May 2022 02:05)  HR: 88 (31 May 2022 12:40) (71 - 112)  BP: 154/73 (31 May 2022 12:40) (116/64 - 193/78)  BP(mean): 105 (31 May 2022 11:00) (85 - 114)  RR: 18 (31 May 2022 12:40) (16 - 29)  SpO2: 100% (31 May 2022 12:40) (94% - 100%)    PHYSICAL EXAM:  General: Anxious  HEENT: NC/AT; PERRL, anicteric sclera; MMM  Neck: supple  Cardiovascular: +S1/S2; RRR  Respiratory: CTA B/L; no W/R/R  Gastrointestinal: soft, NT/ND; +BSx4  Extremities: WWP; no edema, clubbing or cyanosis  Vascular: 2+ radial, DP/PT pulses B/L  Neurological: AAOx3; no focal deficits    MEDICATIONS:  MEDICATIONS  (STANDING):  amLODIPine   Tablet 10 milliGRAM(s) Oral every 24 hours  hydrALAZINE 25 milliGRAM(s) Oral every 6 hours    MEDICATIONS  (PRN):  acetaminophen     Tablet .. 650 milliGRAM(s) Oral every 6 hours PRN Temp greater or equal to 38C (100.4F), Mild Pain (1 - 3)      ALLERGIES:  Allergies    No Known Allergies    Intolerances        LABS:                        7.7    13.50 )-----------( 339      ( 31 May 2022 05:50 )             23.0     05-31    139  |  110<H>  |  33<H>  ----------------------------<  98  5.0   |  16<L>  |  4.37<H>    Ca    8.0<L>      31 May 2022 05:50  Phos  3.4     05-31  Mg     1.7     05-31    TPro  5.0<L>  /  Alb  3.0<L>  /  TBili  0.5  /  DBili  x   /  AST  8<L>  /  ALT  6<L>  /  AlkPhos  76  05-31    PT/INR - ( 31 May 2022 05:50 )   PT: 11.4 sec;   INR: 0.96          PTT - ( 31 May 2022 05:50 )  PTT:24.9 sec    CAPILLARY BLOOD GLUCOSE          RADIOLOGY & ADDITIONAL TESTS: Reviewed.

## 2022-05-31 NOTE — PROGRESS NOTE ADULT - PROBLEM SELECTOR PLAN 5
Bicarb 16 since admission  Likely 2/2 renal failure    Plan:  -Continue to monitor   -Consider bicarb if not improving Bicarb 16 since admission  Likely 2/2 renal failure    Plan:  -Start sodium bicarb 650 q8

## 2022-05-31 NOTE — PROGRESS NOTE ADULT - SUBJECTIVE AND OBJECTIVE BOX
SCAR VERGARA, 71y, Male  MRN-[id number]  Patient is a 71y old  Male who presents with a chief complaint of Acute Blood loss anemia due to gastrointestinal bleeding (31 May 2022 07:48)      OVERNIGHT EVENTS:     SUBJECTIVE:    12 Point ROS Negative unless noted otherwise above.  -------------------------------------------------------------------------------  VITAL SIGNS:  Vital Signs Last 24 Hrs  T(C): 36.8 (31 May 2022 06:05), Max: 37.1 (31 May 2022 02:05)  T(F): 98.2 (31 May 2022 06:05), Max: 98.8 (31 May 2022 02:05)  HR: 83 (31 May 2022 08:00) (70 - 109)  BP: 153/69 (31 May 2022 08:00) (116/64 - 193/78)  BP(mean): 99 (31 May 2022 08:00) (85 - 114)  RR: 22 (31 May 2022 08:00) (16 - 29)  SpO2: 100% (31 May 2022 08:00) (94% - 100%)  I&O's Summary    30 May 2022 07:01  -  31 May 2022 07:00  --------------------------------------------------------  IN: 120 mL / OUT: 0 mL / NET: 120 mL        PHYSICAL EXAM:    General: NAD, well developed  HEENT: NC/AT; EOMI, PERRLA, anicteric sclera; moist mucosal membranes.  Neck: supple, trachea midline  Cardiovascular: RRR, +S1/S2; NO M/R/G  Respiratory: CTA B/L; no W/R/R  Gastrointestinal: soft, NT/ND; +BSx4  Extremities: WWP; no edema or cyanosis  Vascular: 2+ radial, DP/PT pulses B/L  Neurological: AAOx3; no focal deficits    ALLERGIES:  Allergies    No Known Allergies    Intolerances        MEDICATIONS:  MEDICATIONS  (STANDING):  amLODIPine   Tablet 10 milliGRAM(s) Oral every 24 hours  chlorhexidine 2% Cloths 1 Application(s) Topical <User Schedule>  hydrALAZINE 25 milliGRAM(s) Oral every 6 hours  magnesium sulfate  IVPB 1 Gram(s) IV Intermittent once  pantoprazole Infusion 8 mG/Hr (10 mL/Hr) IV Continuous <Continuous>    MEDICATIONS  (PRN):  acetaminophen     Tablet .. 650 milliGRAM(s) Oral every 6 hours PRN Temp greater or equal to 38C (100.4F), Mild Pain (1 - 3)      -------------------------------------------------------------------------------  LABS:                        7.7    13.50 )-----------( 339      ( 31 May 2022 05:50 )             23.0     05-31    139  |  110<H>  |  33<H>  ----------------------------<  98  5.0   |  16<L>  |  4.37<H>    Ca    8.0<L>      31 May 2022 05:50  Phos  3.4     05-31  Mg     1.7     05-31    TPro  5.0<L>  /  Alb  3.0<L>  /  TBili  0.5  /  DBili  x   /  AST  8<L>  /  ALT  6<L>  /  AlkPhos  76  05-31    LIVER FUNCTIONS - ( 31 May 2022 05:50 )  Alb: 3.0 g/dL / Pro: 5.0 g/dL / ALK PHOS: 76 U/L / ALT: 6 U/L / AST: 8 U/L / GGT: x           PT/INR - ( 31 May 2022 05:50 )   PT: 11.4 sec;   INR: 0.96          PTT - ( 31 May 2022 05:50 )  PTT:24.9 sec    CAPILLARY BLOOD GLUCOSE          Culture - Urine (collected 29 May 2022 08:23)  Source: Clean Catch Clean Catch (Midstream)  Preliminary Report (30 May 2022 09:34):    >100,000 CFU/ml Klebsiella pneumoniae .... mucoid strain    Susceptibility to follow.      COVID-19 PCR: NotDetec (28 May 2022 14:10)      RADIOLOGY & ADDITIONAL TESTS: Reviewed.   SCAR VERGARA, 71y, Male  MRN-[id number]  Patient is a 71y old  Male who presents with a chief complaint of Acute Blood loss anemia due to gastrointestinal bleeding (31 May 2022 07:48)      OVERNIGHT EVENTS: NAEO. Hgb overnight stable. Pt completed colonoscopy prep.     SUBJECTIVE: Pt seen/examined at bedside. Pt stating he is anxious about his colonoscopy today however overall he feels good. He denies having any bloody bowel movements overnight. Denied cough, SOB, abdominal pain, dysuria, hematuria, nausea/vomiting.     12 Point ROS Negative unless noted otherwise above.  -------------------------------------------------------------------------------  VITAL SIGNS:  Vital Signs Last 24 Hrs  T(C): 36.8 (31 May 2022 06:05), Max: 37.1 (31 May 2022 02:05)  T(F): 98.2 (31 May 2022 06:05), Max: 98.8 (31 May 2022 02:05)  HR: 83 (31 May 2022 08:00) (70 - 109)  BP: 153/69 (31 May 2022 08:00) (116/64 - 193/78)  BP(mean): 99 (31 May 2022 08:00) (85 - 114)  RR: 22 (31 May 2022 08:00) (16 - 29)  SpO2: 100% (31 May 2022 08:00) (94% - 100%)  I&O's Summary    30 May 2022 07:01  -  31 May 2022 07:00  --------------------------------------------------------  IN: 120 mL / OUT: 0 mL / NET: 120 mL        PHYSICAL EXAM:    General: NAD, sitting up in bed speaking in full sentences   HEENT: NC/AT; moist mucosal membranes.  Neck: supple  Cardiovascular: RRR, +S1/S2; NO M/R/G  Respiratory: CTA B/L; no W/R/R  Gastrointestinal: soft, NT/ND; +BSx4  Extremities: WWP; no edema or cyanosis  Vascular: 2+ radial, DP/PT pulses B/L  Neurological: AAOx3; no focal deficits    ALLERGIES:  Allergies    No Known Allergies    Intolerances        MEDICATIONS:  MEDICATIONS  (STANDING):  amLODIPine   Tablet 10 milliGRAM(s) Oral every 24 hours  chlorhexidine 2% Cloths 1 Application(s) Topical <User Schedule>  hydrALAZINE 25 milliGRAM(s) Oral every 6 hours  magnesium sulfate  IVPB 1 Gram(s) IV Intermittent once  pantoprazole Infusion 8 mG/Hr (10 mL/Hr) IV Continuous <Continuous>    MEDICATIONS  (PRN):  acetaminophen     Tablet .. 650 milliGRAM(s) Oral every 6 hours PRN Temp greater or equal to 38C (100.4F), Mild Pain (1 - 3)      -------------------------------------------------------------------------------  LABS:                      7.7    13.50 )-----------( 339      ( 31 May 2022 05:50 )             23.0     05-31    139  |  110<H>  |  33<H>  ----------------------------<  98  5.0   |  16<L>  |  4.37<H>    Ca    8.0<L>      31 May 2022 05:50  Phos  3.4     05-31  Mg     1.7     05-31    TPro  5.0<L>  /  Alb  3.0<L>  /  TBili  0.5  /  DBili  x   /  AST  8<L>  /  ALT  6<L>  /  AlkPhos  76  05-31    LIVER FUNCTIONS - ( 31 May 2022 05:50 )  Alb: 3.0 g/dL / Pro: 5.0 g/dL / ALK PHOS: 76 U/L / ALT: 6 U/L / AST: 8 U/L / GGT: x           PT/INR - ( 31 May 2022 05:50 )   PT: 11.4 sec;   INR: 0.96          PTT - ( 31 May 2022 05:50 )  PTT:24.9 sec    CAPILLARY BLOOD GLUCOSE          Culture - Urine (collected 29 May 2022 08:23)  Source: Clean Catch Clean Catch (Midstream)  Preliminary Report (30 May 2022 09:34):    >100,000 CFU/ml Klebsiella pneumoniae .... mucoid strain    Susceptibility to follow.      COVID-19 PCR: NotDetec (28 May 2022 14:10)      RADIOLOGY & ADDITIONAL TESTS: Reviewed.   SCAR VERGARA, 71y, Male  MRN-[id number]  Patient is a 71y old  Male who presents with a chief complaint of Acute Blood loss anemia due to gastrointestinal bleeding (31 May 2022 07:48)      ****Transfer from MICU to [facility]****  Hospital Course: 72 yo male with PMHx of HTN (not on medications for past 3 years) presents with rectal bleeding. Pt stopped seeking medical care since start of COVID pandemic, stopped taking his norvasc, HCTZ, ASA. He was otherwise at his usual baseline health until last Thursday 5/19 when he had rectal bleeding with bowel movements, in toilet bowl not mixed with stools, with clots present. He had another similar episode Tues. 5/31, prompting a PCP visit where lab work was performed. His doctor then called him on day of admission and urged him to present to ED for anemia.  ED Vitals: T: 98 F, HR: 129, BP: 215/93, RR: 20, SpO2: 99% on RA. ED Labs WBC 12.4, Hgb 5.4 MCV 90.7, Plt 486. Trop high sens. (80.1). Bicarb 16. BUN/Cr 44/5.27. GFR 11. COVID neg. ED interventions: xanax 0.5 mg PO x1, norvasc 10mg PO x1, pantoprazole 80mg IV x1 then gtt. NS 1L bolus x1. Pt admitted to ICU. Pt received 3 units pRBC initially and then required additional pRBC on 5/29. Patient hgb has been stable over past 24 hours. Patient stared on PO hydralazine which was uptitrated to 25mg q6hr and resumed on Norvasc 10mg qd. Per GI, bleeding is likely diverticular in etiology and blood pressure control is most important at preventing future bleeds. Patient undergoing colonoscopy on 5/31 with GI and completed prep on 5/30. Patient urine culture growing klebsiella however pt asymptomatic therefore not being treated. Hgb stable. Patient hypertensive with SBPs 140s-160s. Can consider adding another agent s/p colonoscopy with GI today. Patient continued on protonix gtt. Stable for stepdown to [facility].         OVERNIGHT EVENTS: NAEO. Hgb overnight stable. Pt completed colonoscopy prep.     SUBJECTIVE: Pt seen/examined at bedside. Pt stating he is anxious about his colonoscopy today however overall he feels good. He denies having any bloody bowel movements overnight. Denied cough, SOB, abdominal pain, dysuria, hematuria, nausea/vomiting.     12 Point ROS Negative unless noted otherwise above.  -------------------------------------------------------------------------------  VITAL SIGNS:  Vital Signs Last 24 Hrs  T(C): 36.8 (31 May 2022 06:05), Max: 37.1 (31 May 2022 02:05)  T(F): 98.2 (31 May 2022 06:05), Max: 98.8 (31 May 2022 02:05)  HR: 83 (31 May 2022 08:00) (70 - 109)  BP: 153/69 (31 May 2022 08:00) (116/64 - 193/78)  BP(mean): 99 (31 May 2022 08:00) (85 - 114)  RR: 22 (31 May 2022 08:00) (16 - 29)  SpO2: 100% (31 May 2022 08:00) (94% - 100%)  I&O's Summary    30 May 2022 07:01  -  31 May 2022 07:00  --------------------------------------------------------  IN: 120 mL / OUT: 0 mL / NET: 120 mL        PHYSICAL EXAM:    General: NAD, sitting up in bed speaking in full sentences   HEENT: NC/AT; moist mucosal membranes.  Neck: supple  Cardiovascular: RRR, +S1/S2; NO M/R/G  Respiratory: CTA B/L; no W/R/R  Gastrointestinal: soft, NT/ND; +BSx4  Extremities: WWP; no edema or cyanosis  Vascular: 2+ radial, DP/PT pulses B/L  Neurological: AAOx3; no focal deficits    ALLERGIES:  Allergies    No Known Allergies    Intolerances        MEDICATIONS:  MEDICATIONS  (STANDING):  amLODIPine   Tablet 10 milliGRAM(s) Oral every 24 hours  chlorhexidine 2% Cloths 1 Application(s) Topical <User Schedule>  hydrALAZINE 25 milliGRAM(s) Oral every 6 hours  magnesium sulfate  IVPB 1 Gram(s) IV Intermittent once  pantoprazole Infusion 8 mG/Hr (10 mL/Hr) IV Continuous <Continuous>    MEDICATIONS  (PRN):  acetaminophen     Tablet .. 650 milliGRAM(s) Oral every 6 hours PRN Temp greater or equal to 38C (100.4F), Mild Pain (1 - 3)      -------------------------------------------------------------------------------  LABS:                      7.7    13.50 )-----------( 339      ( 31 May 2022 05:50 )             23.0     05-31    139  |  110<H>  |  33<H>  ----------------------------<  98  5.0   |  16<L>  |  4.37<H>    Ca    8.0<L>      31 May 2022 05:50  Phos  3.4     05-31  Mg     1.7     05-31    TPro  5.0<L>  /  Alb  3.0<L>  /  TBili  0.5  /  DBili  x   /  AST  8<L>  /  ALT  6<L>  /  AlkPhos  76  05-31    LIVER FUNCTIONS - ( 31 May 2022 05:50 )  Alb: 3.0 g/dL / Pro: 5.0 g/dL / ALK PHOS: 76 U/L / ALT: 6 U/L / AST: 8 U/L / GGT: x           PT/INR - ( 31 May 2022 05:50 )   PT: 11.4 sec;   INR: 0.96          PTT - ( 31 May 2022 05:50 )  PTT:24.9 sec    CAPILLARY BLOOD GLUCOSE          Culture - Urine (collected 29 May 2022 08:23)  Source: Clean Catch Clean Catch (Midstream)  Preliminary Report (30 May 2022 09:34):    >100,000 CFU/ml Klebsiella pneumoniae .... mucoid strain    Susceptibility to follow.      COVID-19 PCR: NotDetec (28 May 2022 14:10)      RADIOLOGY & ADDITIONAL TESTS: Reviewed.

## 2022-05-31 NOTE — DISCHARGE NOTE PROVIDER - NSDCFUADDAPPT_GEN_ALL_CORE_FT
(1) Please see Dr. Lundberg per your scheduled appointment above, for post hospital discharge follow up. Please note that your blood pressure medications will require monitoring as well as your creatinine level.  (1) Please see Dr. Lundberg per your scheduled appointment above, for post hospital discharge follow up. Please note that your blood pressure medications will require monitoring as well as your creatinine level.     (2) Please follow up with gastroenterology at [facility]. The office will call you regarding scheduling an appointment which works within your schedule.

## 2022-05-31 NOTE — DISCHARGE NOTE PROVIDER - HOSPITAL COURSE
#Discharge: do not delete    Pt is a 70 yo F with PMH HTN who p/w rectal bleeding with BMs x2d. Found to be HTN and have Hb 5's requiring transfusions from likely LIGB 2/2 suspected diverticular bleed.    Hospital course (by problem):     #GIB  GIB x2 at home, BRBPR with bowel movements and associated weakness, fatigue, and decreased appetite. No prior GIB; normal colonoscopy 6 yrs ago; no known family history of cancer. Hgb 5.4 MCV 90.7 on admission. Pantoprazole 80mg IV x1 then gtt then dc'd 5/31 per GI team.  s/p total of 4 units PRBC, last on 5/29, with Hb stable 7 to 8 since then with no further bleeding  Per GI, bleed etiology likely diverticular and mainstay of tx will be HTN control   Scope done ______ showed _____    #HTN  Admitted with SBP >200  Pt was not taking anti-HTNs or seeing doctors for past two years  -c/w amlodipine 10mg daily  -c/w hydralazine 25mg q6hrs.    #VAMSHI on CKD  Baseline in 2016 - 2018: 1.5 to 1.9  Cr 5.27 ->4.37 since admission  Likely VAMSHI 2/2 pre-renal i/s/o GIB on superimposed CKD    #Leukocytosis  WBC 11-13 since admission  Likely reactive i/s/o GIB  No s/s infection    #Metabolic Acidosis    #Anemia    Patient was discharged to: (home/[facility]/acute rehab/hospice, etc, and with what services – home health PT/RN? Home O2?)    New medications:   Changes to old medications:  Medications that were stopped:    Items to follow up as outpatient:    Physical exam at the time of discharge:       #Discharge: do not delete    Pt is a 72 yo F with PMH HTN who p/w rectal bleeding with BMs x2d. Found to be HTN and have Hb 5's requiring transfusions from likely LIGB 2/2 suspected diverticular bleed.    Hospital course (by problem):     #GIB  GIB x2 at home, BRBPR with bowel movements and associated weakness, fatigue, and decreased appetite. No prior GIB; normal colonoscopy 6 yrs ago; no known family history of cancer. Hgb 5.4 MCV 90.7 on admission. Pantoprazole 80mg IV x1 then gtt then dc'd 5/31 per GI team.  s/p total of 4 units PRBC, last on 5/29, with Hb stable 7 to 8 since then with no further bleeding  Per GI, bleed etiology likely diverticular and mainstay of tx will be HTN control   Scope done ______ showed _____    #HTN  Admitted with SBP >200  Pt was not taking anti-HTNs or seeing doctors for past two years  -c/w amlodipine 10mg daily  -c/w hydralazine 75mg q8hrs.    #VAMSHI on CKD  Baseline in 2016 - 2018: 1.5 to 1.9  Cr 5.27 ->4.37 since admission  Likely VAMSHI 2/2 pre-renal i/s/o GIB on superimposed CKD    #Leukocytosis  WBC 11-13 since admission  Likely reactive i/s/o GIB  No s/s infection    #Metabolic Acidosis  Bicarb 16 since admission. Likely 2/2 renal failure  - c/w sodium bicarb 650 q8    #Anemia  i/s/o GIB likely complicated by underlying Fe deficiency consider Fe supplementation on discharge     Patient was discharged to: home    New medications: amlodipine 10mg PO qd, hydralazine 75mg PO q8h, sodium bicarb 650 q8h  Changes to old medications: NONE   Medications that were stopped: NONE     Items to follow up as outpatient: PCP for post hospital discharge follow up, Cr monitoring, HTN med titration, Fe supplementation     Physical exam at the time of discharge:    General: NAD, anxious  HEENT: NC/AT; PERRL, anicteric sclera; MMM  Neck: supple w/o palpable nodularity  Cardiovascular: +S1/S2; RRR  Respiratory: CTA B/L; no W/R/R  Gastrointestinal: soft, NT/ND; +BSx4  Extremities: WWP; no edema, clubbing or cyanosis  Vascular: 2+ radial, DP/PT pulses B/L  Neurological: AAOx3; no focal deficits     #Discharge: do not delete    Pt is a 72 yo F with PMH HTN who p/w rectal bleeding with BMs x2d. Found to be HTN and have Hb 5's requiring transfusions from likely LIGB 2/2 suspected diverticular bleed.    Hospital course (by problem):     #GIB  GIB x2 at home, BRBPR with bowel movements and associated weakness, fatigue, and decreased appetite. No prior GIB; normal colonoscopy 6 yrs ago; no known family history of cancer. Hgb 5.4 MCV 90.7 on admission. Pantoprazole 80mg IV x1 then gtt then dc'd 5/31 per GI team.  s/p total of 4 units PRBC, last on 5/29, with Hb stable 7 to 8 since then with no further bleeding  Per GI, bleed etiology likely diverticular. EGD showed mild gastritis and duodenitis. biopsies obtained. Colonoscopy with internal hemmrhoids and diverticulosis.   can f/u with GI as outpatient     #HTN  Admitted with SBP >200  Pt was not taking anti-HTNs or seeing doctors for past two years  -c/w amlodipine 10mg daily  -c/w hydralazine 75mg q8hrs.    #VAMSHI on CKD  Baseline in 2016 - 2018: 1.5 to 1.9  Cr 5.27 ->4.37 since admission  Likely VAMSHI 2/2 pre-renal i/s/o GIB on superimposed CKD    #Leukocytosis  WBC 11-13 since admission  Likely reactive i/s/o GIB  No s/s infection    #Metabolic Acidosis  Bicarb 16 since admission. Likely 2/2 renal failure  - c/w sodium bicarb 650 q8    #Anemia  i/s/o GIB likely complicated by underlying Fe deficiency consider Fe supplementation on discharge     Patient was discharged to: home    New medications: amlodipine 10mg PO qd, hydralazine 75mg PO q8h, sodium bicarb 650 q8h  Changes to old medications: NONE   Medications that were stopped: NONE     Items to follow up as outpatient: PCP for post hospital discharge follow up, Cr monitoring, HTN med titration, Fe supplementation     Physical exam at the time of discharge:    General: NAD, anxious  HEENT: NC/AT; PERRL, anicteric sclera; MMM  Neck: supple w/o palpable nodularity  Cardiovascular: +S1/S2; RRR  Respiratory: CTA B/L; no W/R/R  Gastrointestinal: soft, NT/ND; +BSx4  Extremities: WWP; no edema, clubbing or cyanosis  Vascular: 2+ radial, DP/PT pulses B/L  Neurological: AAOx3; no focal deficits

## 2022-05-31 NOTE — PROGRESS NOTE ADULT - SUBJECTIVE AND OBJECTIVE BOX
GASTROENTEROLOGY PROGRESS NOTE  Patient seen and examined at bedside.  No bleeding overnight with prep; hgb stable  Now downgraded to RMF  BPs up to 190s overnight, now 140/150s today    PERTINENT REVIEW OF SYSTEMS:  CONSTITUTIONAL: No weakness, fevers or chills  HEENT: No visual changes; No vertigo or throat pain   GASTROINTESTINAL: As above.  NEUROLOGICAL: No numbness or weakness  SKIN: No itching, burning, rashes, or lesions     Allergies    No Known Allergies    Intolerances      MEDICATIONS:  MEDICATIONS  (STANDING):  amLODIPine   Tablet 10 milliGRAM(s) Oral every 24 hours  hydrALAZINE 25 milliGRAM(s) Oral every 6 hours    MEDICATIONS  (PRN):  acetaminophen     Tablet .. 650 milliGRAM(s) Oral every 6 hours PRN Temp greater or equal to 38C (100.4F), Mild Pain (1 - 3)    Vital Signs Last 24 Hrs  T(C): 36.8 (31 May 2022 09:00), Max: 37.1 (31 May 2022 02:05)  T(F): 98.2 (31 May 2022 09:00), Max: 98.8 (31 May 2022 02:05)  HR: 88 (31 May 2022 12:40) (71 - 112)  BP: 154/73 (31 May 2022 12:40) (116/64 - 193/78)  BP(mean): 105 (31 May 2022 11:00) (85 - 114)  RR: 18 (31 May 2022 12:40) (16 - 29)  SpO2: 100% (31 May 2022 12:40) (94% - 100%)    05-30 @ 07:01  -  05-31 @ 07:00  --------------------------------------------------------  IN: 120 mL / OUT: 0 mL / NET: 120 mL      PHYSICAL EXAM:    General: in no acute distress  HEENT: MMM, conjunctiva and sclera clear  Gastrointestinal: Soft non-tender non-distended; No rebound or guarding  Skin: Warm and dry. No obvious rash    LABS:                        7.7    13.50 )-----------( 339      ( 31 May 2022 05:50 )             23.0     05-31    139  |  110<H>  |  33<H>  ----------------------------<  98  5.0   |  16<L>  |  4.37<H>    Ca    8.0<L>      31 May 2022 05:50  Phos  3.4     05-31  Mg     1.7     05-31    TPro  5.0<L>  /  Alb  3.0<L>  /  TBili  0.5  /  DBili  x   /  AST  8<L>  /  ALT  6<L>  /  AlkPhos  76  05-31    PT/INR - ( 31 May 2022 05:50 )   PT: 11.4 sec;   INR: 0.96          PTT - ( 31 May 2022 05:50 )  PTT:24.9 sec                  Culture - Urine (collected 29 May 2022 08:23)  Source: Clean Catch Clean Catch (Midstream)  Final Report (31 May 2022 10:28):    >100,000 CFU/ml Klebsiella pneumoniae .... mucoid strain  Organism: Klebsiella pneumoniae (31 May 2022 10:28)  Organism: Klebsiella pneumoniae (31 May 2022 10:28)      RADIOLOGY & ADDITIONAL STUDIES:  Reviewed

## 2022-05-31 NOTE — DISCHARGE NOTE PROVIDER - NSDCMRMEDTOKEN_GEN_ALL_CORE_FT
amLODIPine 10 mg oral tablet: 1 tab(s) orally every 24 hours  hydrALAZINE 25 mg oral tablet: 3 tab(s) orally every 8 hours   sodium bicarbonate 650 mg oral tablet: 1 tab(s) orally every 8 hours

## 2022-06-01 LAB
ALBUMIN SERPL ELPH-MCNC: 3.1 G/DL — LOW (ref 3.3–5)
ALP SERPL-CCNC: 79 U/L — SIGNIFICANT CHANGE UP (ref 40–120)
ALT FLD-CCNC: <5 U/L — LOW (ref 10–45)
ANION GAP SERPL CALC-SCNC: 13 MMOL/L — SIGNIFICANT CHANGE UP (ref 5–17)
AST SERPL-CCNC: 8 U/L — LOW (ref 10–40)
BASOPHILS # BLD AUTO: 0.05 K/UL — SIGNIFICANT CHANGE UP (ref 0–0.2)
BASOPHILS NFR BLD AUTO: 0.3 % — SIGNIFICANT CHANGE UP (ref 0–2)
BILIRUB SERPL-MCNC: 0.4 MG/DL — SIGNIFICANT CHANGE UP (ref 0.2–1.2)
BUN SERPL-MCNC: 30 MG/DL — HIGH (ref 7–23)
CALCIUM SERPL-MCNC: 8.2 MG/DL — LOW (ref 8.4–10.5)
CHLORIDE SERPL-SCNC: 110 MMOL/L — HIGH (ref 96–108)
CO2 SERPL-SCNC: 15 MMOL/L — LOW (ref 22–31)
CREAT SERPL-MCNC: 3.92 MG/DL — HIGH (ref 0.5–1.3)
EGFR: 16 ML/MIN/1.73M2 — LOW
EOSINOPHIL # BLD AUTO: 0.7 K/UL — HIGH (ref 0–0.5)
EOSINOPHIL NFR BLD AUTO: 4.9 % — SIGNIFICANT CHANGE UP (ref 0–6)
GLUCOSE SERPL-MCNC: 91 MG/DL — SIGNIFICANT CHANGE UP (ref 70–99)
HCT VFR BLD CALC: 23.4 % — LOW (ref 39–50)
HGB BLD-MCNC: 7.6 G/DL — LOW (ref 13–17)
IMM GRANULOCYTES NFR BLD AUTO: 0.5 % — SIGNIFICANT CHANGE UP (ref 0–1.5)
LYMPHOCYTES # BLD AUTO: 1.15 K/UL — SIGNIFICANT CHANGE UP (ref 1–3.3)
LYMPHOCYTES # BLD AUTO: 8 % — LOW (ref 13–44)
MAGNESIUM SERPL-MCNC: 1.8 MG/DL — SIGNIFICANT CHANGE UP (ref 1.6–2.6)
MCHC RBC-ENTMCNC: 29.2 PG — SIGNIFICANT CHANGE UP (ref 27–34)
MCHC RBC-ENTMCNC: 32.5 GM/DL — SIGNIFICANT CHANGE UP (ref 32–36)
MCV RBC AUTO: 90 FL — SIGNIFICANT CHANGE UP (ref 80–100)
MONOCYTES # BLD AUTO: 0.99 K/UL — HIGH (ref 0–0.9)
MONOCYTES NFR BLD AUTO: 6.9 % — SIGNIFICANT CHANGE UP (ref 2–14)
NEUTROPHILS # BLD AUTO: 11.34 K/UL — HIGH (ref 1.8–7.4)
NEUTROPHILS NFR BLD AUTO: 79.4 % — HIGH (ref 43–77)
NRBC # BLD: 0 /100 WBCS — SIGNIFICANT CHANGE UP (ref 0–0)
PHOSPHATE SERPL-MCNC: 3.8 MG/DL — SIGNIFICANT CHANGE UP (ref 2.5–4.5)
PLATELET # BLD AUTO: 348 K/UL — SIGNIFICANT CHANGE UP (ref 150–400)
POTASSIUM SERPL-MCNC: 4.5 MMOL/L — SIGNIFICANT CHANGE UP (ref 3.5–5.3)
POTASSIUM SERPL-SCNC: 4.5 MMOL/L — SIGNIFICANT CHANGE UP (ref 3.5–5.3)
PROT SERPL-MCNC: 5.1 G/DL — LOW (ref 6–8.3)
RBC # BLD: 2.6 M/UL — LOW (ref 4.2–5.8)
RBC # FLD: 15.6 % — HIGH (ref 10.3–14.5)
SODIUM SERPL-SCNC: 138 MMOL/L — SIGNIFICANT CHANGE UP (ref 135–145)
WBC # BLD: 14.3 K/UL — HIGH (ref 3.8–10.5)
WBC # FLD AUTO: 14.3 K/UL — HIGH (ref 3.8–10.5)

## 2022-06-01 PROCEDURE — 99232 SBSQ HOSP IP/OBS MODERATE 35: CPT

## 2022-06-01 PROCEDURE — 99233 SBSQ HOSP IP/OBS HIGH 50: CPT | Mod: GC

## 2022-06-01 RX ORDER — MAGNESIUM OXIDE 400 MG ORAL TABLET 241.3 MG
400 TABLET ORAL ONCE
Refills: 0 | Status: COMPLETED | OUTPATIENT
Start: 2022-06-01 | End: 2022-06-01

## 2022-06-01 RX ORDER — HYDRALAZINE HCL 50 MG
75 TABLET ORAL EVERY 8 HOURS
Refills: 0 | Status: DISCONTINUED | OUTPATIENT
Start: 2022-06-01 | End: 2022-06-02

## 2022-06-01 RX ORDER — SOD SULF/SODIUM/NAHCO3/KCL/PEG
4000 SOLUTION, RECONSTITUTED, ORAL ORAL ONCE
Refills: 0 | Status: COMPLETED | OUTPATIENT
Start: 2022-06-01 | End: 2022-06-01

## 2022-06-01 RX ADMIN — MAGNESIUM OXIDE 400 MG ORAL TABLET 400 MILLIGRAM(S): 241.3 TABLET ORAL at 09:43

## 2022-06-01 RX ADMIN — Medication 25 MILLIGRAM(S): at 03:57

## 2022-06-01 RX ADMIN — Medication 650 MILLIGRAM(S): at 06:50

## 2022-06-01 RX ADMIN — Medication 75 MILLIGRAM(S): at 16:32

## 2022-06-01 RX ADMIN — Medication 650 MILLIGRAM(S): at 13:25

## 2022-06-01 RX ADMIN — AMLODIPINE BESYLATE 10 MILLIGRAM(S): 2.5 TABLET ORAL at 06:50

## 2022-06-01 RX ADMIN — Medication 650 MILLIGRAM(S): at 21:58

## 2022-06-01 RX ADMIN — Medication 4000 MILLILITER(S): at 17:46

## 2022-06-01 NOTE — PROGRESS NOTE ADULT - SUBJECTIVE AND OBJECTIVE BOX
GASTROENTEROLOGY PROGRESS NOTE  Patient seen and examined at bedside.  Hgb stable at 7.6 no further bleeding. no complaints. BPs still in hypertensive range.    PERTINENT REVIEW OF SYSTEMS:  CONSTITUTIONAL: No weakness, fevers or chills  HEENT: No visual changes; No vertigo or throat pain   GASTROINTESTINAL: As above.  NEUROLOGICAL: No numbness or weakness  SKIN: No itching, burning, rashes, or lesions     Allergies    No Known Allergies    Intolerances      MEDICATIONS:  MEDICATIONS  (STANDING):  amLODIPine   Tablet 10 milliGRAM(s) Oral every 24 hours  hydrALAZINE 25 milliGRAM(s) Oral every 6 hours  magnesium oxide 400 milliGRAM(s) Oral once  sodium bicarbonate 650 milliGRAM(s) Oral every 8 hours    MEDICATIONS  (PRN):  acetaminophen     Tablet .. 650 milliGRAM(s) Oral every 6 hours PRN Temp greater or equal to 38C (100.4F), Mild Pain (1 - 3)  ALPRAZolam 0.5 milliGRAM(s) Oral two times a day PRN Anxiety    Vital Signs Last 24 Hrs  T(C): 37.4 (01 Jun 2022 05:15), Max: 37.4 (01 Jun 2022 05:15)  T(F): 99.4 (01 Jun 2022 05:15), Max: 99.4 (01 Jun 2022 05:15)  HR: 91 (01 Jun 2022 05:15) (87 - 112)  BP: 163/67 (01 Jun 2022 05:15) (140/73 - 180/83)  BP(mean): 3 (01 Jun 2022 05:15) (3 - 105)  RR: 17 (01 Jun 2022 05:15) (16 - 20)  SpO2: 100% (01 Jun 2022 05:15) (98% - 100%)    PHYSICAL EXAM:    General: in no acute distress  HEENT: MMM, conjunctiva and sclera clear  Gastrointestinal: Soft non-tender non-distended; No rebound or guarding  Skin: Warm and dry. No obvious rash    LABS:                        7.6    14.30 )-----------( 348      ( 01 Jun 2022 05:30 )             23.4     06-01    138  |  110<H>  |  30<H>  ----------------------------<  91  4.5   |  15<L>  |  3.92<H>    Ca    8.2<L>      01 Jun 2022 05:30  Phos  3.8     06-01  Mg     1.8     06-01    TPro  5.1<L>  /  Alb  3.1<L>  /  TBili  0.4  /  DBili  x   /  AST  8<L>  /  ALT  <5<L>  /  AlkPhos  79  06-01    PT/INR - ( 31 May 2022 05:50 )   PT: 11.4 sec;   INR: 0.96          PTT - ( 31 May 2022 05:50 )  PTT:24.9 sec                  RADIOLOGY & ADDITIONAL STUDIES:  Reviewed

## 2022-06-01 NOTE — PROGRESS NOTE ADULT - PROBLEM SELECTOR PLAN 3
Baseline in 2016 - 2018: 1.5 to 1.9. Cr downtrending since admission. Likely VAMSHI 2/2 pre-renal ATN i/s/o GIB on superimposed CKD. U/S showed medical renal disease likely 2/2 chronic HTN.   - Treat GIB as above  - Avoid nephrotoxins

## 2022-06-01 NOTE — PROGRESS NOTE ADULT - SUBJECTIVE AND OBJECTIVE BOX
OVERNIGHT EVENTS: JERRICA    SUBJECTIVE / INTERVAL HPI: Patient seen and examined at bedside. Patient reports being comfortable with minor nonproductive cough, denies other ROS.     VITAL SIGNS:  Vital Signs Last 24 Hrs  T(C): 36.8 (01 Jun 2022 09:30), Max: 37.4 (01 Jun 2022 05:15)  T(F): 98.3 (01 Jun 2022 09:30), Max: 99.4 (01 Jun 2022 05:15)  HR: 105 (01 Jun 2022 09:30) (88 - 105)  BP: 156/69 (01 Jun 2022 09:30) (154/73 - 180/83)  BP(mean): 3 (01 Jun 2022 05:15) (3 - 3)  RR: 17 (01 Jun 2022 05:15) (16 - 18)  SpO2: 100% (01 Jun 2022 05:15) (100% - 100%)    PHYSICAL EXAM:    General: NAD  HEENT: NC/AT; PERRL, anicteric sclera; MMM  Neck: supple w/o palpable nodularity  Cardiovascular: +S1/S2; RRR  Respiratory: CTA B/L; no W/R/R  Gastrointestinal: soft, NT/ND; +BSx4  Extremities: WWP; no edema, clubbing or cyanosis  Vascular: 2+ radial, DP pulses B/L  Neurological: AAOx3; no focal deficits    MEDICATIONS:  MEDICATIONS  (STANDING):  amLODIPine   Tablet 10 milliGRAM(s) Oral every 24 hours  hydrALAZINE 75 milliGRAM(s) Oral every 8 hours  sodium bicarbonate 650 milliGRAM(s) Oral every 8 hours    MEDICATIONS  (PRN):  acetaminophen     Tablet .. 650 milliGRAM(s) Oral every 6 hours PRN Temp greater or equal to 38C (100.4F), Mild Pain (1 - 3)  ALPRAZolam 0.5 milliGRAM(s) Oral two times a day PRN Anxiety      ALLERGIES:  Allergies    No Known Allergies    Intolerances        LABS:                        7.6    14.30 )-----------( 348      ( 01 Jun 2022 05:30 )             23.4     06-01    138  |  110<H>  |  30<H>  ----------------------------<  91  4.5   |  15<L>  |  3.92<H>    Ca    8.2<L>      01 Jun 2022 05:30  Phos  3.8     06-01  Mg     1.8     06-01    TPro  5.1<L>  /  Alb  3.1<L>  /  TBili  0.4  /  DBili  x   /  AST  8<L>  /  ALT  <5<L>  /  AlkPhos  79  06-01    PT/INR - ( 31 May 2022 05:50 )   PT: 11.4 sec;   INR: 0.96          PTT - ( 31 May 2022 05:50 )  PTT:24.9 sec    CAPILLARY BLOOD GLUCOSE          RADIOLOGY & ADDITIONAL TESTS: Reviewed.

## 2022-06-01 NOTE — PROGRESS NOTE ADULT - ASSESSMENT
71M PMH HTN presents for intermittent rectal bleeding x1 week, admitted for symptomatic anemia to 5. and hypertensive emergency with VAMSHI. GI consulted for hematochezia.    #Hematochezia  Most likely LGIB given bright red blood and stable hemodynamics with intermittent bleeding over the past week. Last c-scope 6 years ago and was told nuts may bother him, suggestive of possible diverticulosis. Furthermore, clinical picture of painless hematochezia is suggestive of diverticular bleed, in an otherwise healthy male not on AC/ nsaids.     Recommendations:  - Maintain active T&S, large bore IV access  - Transfusion threshold per primary team  - Blood pressure control  - Plan for colonoscopy tomorrow  - Clear diet Today; npo pm  - 4L golytely starting at 5pm    case d/w svc attending and primary team    Dottie Smith MD  Gastroenterology Fellow, PGY -4   Pager [phone]  [phone]

## 2022-06-01 NOTE — PROGRESS NOTE ADULT - ASSESSMENT
71M PMH HTN presented for GIB x1 week, admitted for symptomatic anemia to 5.4 2/2 GIB, and hypertensive emergency. Received 4U pRBC, uptitrated BP medications to maintain SBP <150mmHG, w/ resolution of BRBPR.

## 2022-06-02 ENCOUNTER — TRANSCRIPTION ENCOUNTER (OUTPATIENT)
Age: 72
End: 2022-06-02

## 2022-06-02 ENCOUNTER — RESULT REVIEW (OUTPATIENT)
Age: 72
End: 2022-06-02

## 2022-06-02 VITALS
RESPIRATION RATE: 18 BRPM | HEART RATE: 91 BPM | SYSTOLIC BLOOD PRESSURE: 153 MMHG | OXYGEN SATURATION: 100 % | DIASTOLIC BLOOD PRESSURE: 74 MMHG | TEMPERATURE: 98 F

## 2022-06-02 LAB
ALBUMIN SERPL ELPH-MCNC: 2.7 G/DL — LOW (ref 3.3–5)
ALP SERPL-CCNC: 73 U/L — SIGNIFICANT CHANGE UP (ref 40–120)
ALT FLD-CCNC: 5 U/L — LOW (ref 10–45)
ANION GAP SERPL CALC-SCNC: 12 MMOL/L — SIGNIFICANT CHANGE UP (ref 5–17)
AST SERPL-CCNC: 9 U/L — LOW (ref 10–40)
BILIRUB SERPL-MCNC: 0.3 MG/DL — SIGNIFICANT CHANGE UP (ref 0.2–1.2)
BUN SERPL-MCNC: 26 MG/DL — HIGH (ref 7–23)
CALCIUM SERPL-MCNC: 8.1 MG/DL — LOW (ref 8.4–10.5)
CHLORIDE SERPL-SCNC: 107 MMOL/L — SIGNIFICANT CHANGE UP (ref 96–108)
CO2 SERPL-SCNC: 17 MMOL/L — LOW (ref 22–31)
CREAT SERPL-MCNC: 3.8 MG/DL — HIGH (ref 0.5–1.3)
EGFR: 16 ML/MIN/1.73M2 — LOW
GLUCOSE SERPL-MCNC: 82 MG/DL — SIGNIFICANT CHANGE UP (ref 70–99)
HCT VFR BLD CALC: 21.2 % — LOW (ref 39–50)
HCT VFR BLD CALC: 26.6 % — LOW (ref 39–50)
HGB BLD-MCNC: 7 G/DL — CRITICAL LOW (ref 13–17)
HGB BLD-MCNC: 8.6 G/DL — LOW (ref 13–17)
MAGNESIUM SERPL-MCNC: 2 MG/DL — SIGNIFICANT CHANGE UP (ref 1.6–2.6)
MCHC RBC-ENTMCNC: 29.7 PG — SIGNIFICANT CHANGE UP (ref 27–34)
MCHC RBC-ENTMCNC: 29.9 PG — SIGNIFICANT CHANGE UP (ref 27–34)
MCHC RBC-ENTMCNC: 32.3 GM/DL — SIGNIFICANT CHANGE UP (ref 32–36)
MCHC RBC-ENTMCNC: 33 GM/DL — SIGNIFICANT CHANGE UP (ref 32–36)
MCV RBC AUTO: 90.6 FL — SIGNIFICANT CHANGE UP (ref 80–100)
MCV RBC AUTO: 91.7 FL — SIGNIFICANT CHANGE UP (ref 80–100)
NRBC # BLD: 0 /100 WBCS — SIGNIFICANT CHANGE UP (ref 0–0)
NRBC # BLD: 0 /100 WBCS — SIGNIFICANT CHANGE UP (ref 0–0)
PLATELET # BLD AUTO: 325 K/UL — SIGNIFICANT CHANGE UP (ref 150–400)
PLATELET # BLD AUTO: 357 K/UL — SIGNIFICANT CHANGE UP (ref 150–400)
POTASSIUM SERPL-MCNC: 4.5 MMOL/L — SIGNIFICANT CHANGE UP (ref 3.5–5.3)
POTASSIUM SERPL-SCNC: 4.5 MMOL/L — SIGNIFICANT CHANGE UP (ref 3.5–5.3)
PROT SERPL-MCNC: 5 G/DL — LOW (ref 6–8.3)
RBC # BLD: 2.34 M/UL — LOW (ref 4.2–5.8)
RBC # BLD: 2.9 M/UL — LOW (ref 4.2–5.8)
RBC # FLD: 15.5 % — HIGH (ref 10.3–14.5)
RBC # FLD: 15.9 % — HIGH (ref 10.3–14.5)
SODIUM SERPL-SCNC: 136 MMOL/L — SIGNIFICANT CHANGE UP (ref 135–145)
WBC # BLD: 10.8 K/UL — HIGH (ref 3.8–10.5)
WBC # BLD: 14.74 K/UL — HIGH (ref 3.8–10.5)
WBC # FLD AUTO: 10.8 K/UL — HIGH (ref 3.8–10.5)
WBC # FLD AUTO: 14.74 K/UL — HIGH (ref 3.8–10.5)

## 2022-06-02 PROCEDURE — 99233 SBSQ HOSP IP/OBS HIGH 50: CPT | Mod: GC

## 2022-06-02 PROCEDURE — 43239 EGD BIOPSY SINGLE/MULTIPLE: CPT

## 2022-06-02 PROCEDURE — 45378 DIAGNOSTIC COLONOSCOPY: CPT

## 2022-06-02 PROCEDURE — 88305 TISSUE EXAM BY PATHOLOGIST: CPT | Mod: 26

## 2022-06-02 RX ORDER — HYDRALAZINE HCL 50 MG
3 TABLET ORAL
Qty: 270 | Refills: 2 | DISCHARGE
Start: 2022-06-02 | End: 2022-08-30

## 2022-06-02 RX ORDER — HYDRALAZINE HCL 50 MG
3 TABLET ORAL
Qty: 270 | Refills: 2
Start: 2022-06-02 | End: 2022-08-30

## 2022-06-02 RX ORDER — SODIUM BICARBONATE 1 MEQ/ML
1 SYRINGE (ML) INTRAVENOUS
Qty: 90 | Refills: 2
Start: 2022-06-02 | End: 2022-08-30

## 2022-06-02 RX ORDER — SODIUM BICARBONATE 1 MEQ/ML
0.5 SYRINGE (ML) INTRAVENOUS
Qty: 90 | Refills: 2 | DISCHARGE
Start: 2022-06-02 | End: 2022-08-30

## 2022-06-02 RX ORDER — AMLODIPINE BESYLATE 2.5 MG/1
1 TABLET ORAL
Qty: 30 | Refills: 2
Start: 2022-06-02 | End: 2022-08-30

## 2022-06-02 RX ADMIN — Medication 650 MILLIGRAM(S): at 06:01

## 2022-06-02 RX ADMIN — Medication 75 MILLIGRAM(S): at 00:11

## 2022-06-02 RX ADMIN — Medication 75 MILLIGRAM(S): at 08:00

## 2022-06-02 RX ADMIN — Medication 75 MILLIGRAM(S): at 18:05

## 2022-06-02 RX ADMIN — Medication 650 MILLIGRAM(S): at 18:05

## 2022-06-02 RX ADMIN — AMLODIPINE BESYLATE 10 MILLIGRAM(S): 2.5 TABLET ORAL at 06:01

## 2022-06-02 NOTE — DISCHARGE NOTE NURSING/CASE MANAGEMENT/SOCIAL WORK - NSDCPEFALRISK_GEN_ALL_CORE
For information on Fall & Injury Prevention, visit: [email or web address] OR  [email or web address] OR  https://www.cdc.gov/steadi/patient.html

## 2022-06-02 NOTE — DISCHARGE NOTE NURSING/CASE MANAGEMENT/SOCIAL WORK - NSDCFUADDAPPT_GEN_ALL_CORE_FT
(1) Please see Dr. Lundberg per your scheduled appointment above, for post hospital discharge follow up. Please note that your blood pressure medications will require monitoring as well as your creatinine level.     (2) Please follow up with gastroenterology at [facility]. The office will call you regarding scheduling an appointment which works within your schedule.

## 2022-06-02 NOTE — PROGRESS NOTE ADULT - PROBLEM SELECTOR PLAN 4
WBC uptrending since admission, patient afebrile throughout course. Likely reactive i/s/o GIB, no s/s infection despite UCx with Klebsiella (patient remains asymptomatic)  - continue to monitor
WBC 11-13 since admission  Likely reactive i/s/o GIB  No s/s infection    Plan:  -continue to monitor
WBC uptrending since admission, patient afebrile throughout course. Likely reactive i/s/o GIB, no s/s infection despite UCx with Klebsiella (patient remains asymptomatic)  - continue to monitor

## 2022-06-02 NOTE — DISCHARGE NOTE NURSING/CASE MANAGEMENT/SOCIAL WORK - PATIENT PORTAL LINK FT
You can access the FollowMyHealth Patient Portal offered by [facility] by registering at the following website: [email or web address]. By joining [facility]’s FollowMyHealth portal, you will also be able to view your health information using other applications (apps) compatible with our system.

## 2022-06-02 NOTE — PROGRESS NOTE ADULT - PROBLEM SELECTOR PLAN 2
Admitted with SBP >200, now controlled at 140-150/60-70  Pt was not taking anti-HTNs or seeing doctors for past two years    Plan:  -c/w amlodipine 10mg daily  -c/w hydralazine 25mg q6hrs
Admitted with SBP >200, goal SBP <150mmHg. Pt was not taking anti-HTNs or seeing doctors for past two years  - c/w amlodipine 10mg daily  - increased hydralazine 75mg PO q8h (to minimize number of daily doses to improve medication compliance) from 25mg q6hrs
Admitted with SBP >200, goal SBP <150mmHg. Pt was not taking anti-HTNs or seeing doctors for past two years  - c/w amlodipine 10mg daily  - increased hydralazine 75mg PO q8h (to minimize number of daily doses to improve medication compliance) from 25mg q6hrs  - pt w/ 1 episode of HTN w/  --> will continue to monitor, if recurrence will uptitrate hydral to 100 TID & transition amlodipine to nifedipine for further outpatient titration

## 2022-06-02 NOTE — CHART NOTE - NSCHARTNOTEFT_GEN_A_CORE
Pt s/p EGD and colonoscopy with myself and Dr. Schmidt    EGD:  - irregular Z line, bx to evaluate for bergman's esophagus  - mild gastritis  - mild duodenitis of bulb    Colonoscopy:  -  internal hemorrhoids  - diverticulosis of left, transverse, and right colon    Plan:  - advance diet as tolerated  - follow up pathology  - can follow up with GI outpatient, for consideration of VCE if anemia does not improve

## 2022-06-02 NOTE — PROGRESS NOTE ADULT - PROBLEM SELECTOR PLAN 6
Admitted with 5.4, MCV 90 2/2 Lower GIB (likely with underlying iron deficiency). Now s/p 4 units PRBC and bleeding has resolved. Now stable 7-8  - Maintain active T&S  - Transfuse <7
Admitted with 5.4, MCV 90  2/2 Lower GIB  Now s/p 4 units PRBC and bleeding has resolved  Now stable 7-8    Plan:  -Maintain active T&S  -Transfuse <7
Admitted with 5.4, MCV 90 2/2 Lower GIB (likely with underlying iron deficiency). Now s/p 4 units PRBC and bleeding has resolved. Now stable 7-8  - Maintain active T&S  - Transfuse <7

## 2022-06-02 NOTE — PROGRESS NOTE ADULT - REASON FOR ADMISSION
Acute Blood loss anemia due to gastrointestinal bleeding

## 2022-06-02 NOTE — PROGRESS NOTE ADULT - SUBJECTIVE AND OBJECTIVE BOX
OVERNIGHT EVENTS: Hgb 7 on morning labs, patient asymptomatic. No melena or BRBPR.     SUBJECTIVE / INTERVAL HPI: Patient seen and examined at bedside. Patient reports anxiety in preparation for c-scope. Denies remaining ROS.     VITAL SIGNS:  Vital Signs Last 24 Hrs  T(C): 36.9 (02 Jun 2022 07:54), Max: 36.9 (02 Jun 2022 07:54)  T(F): 98.4 (02 Jun 2022 07:54), Max: 98.4 (02 Jun 2022 07:54)  HR: 98 (02 Jun 2022 07:54) (75 - 100)  BP: 155/63 (02 Jun 2022 07:54) (146/65 - 170/63)  BP(mean): --  RR: 18 (02 Jun 2022 07:54) (17 - 18)  SpO2: 100% (02 Jun 2022 07:54) (100% - 100%)    PHYSICAL EXAM:    General: NAD, anxious  HEENT: NC/AT; PERRL, anicteric sclera; MMM  Neck: supple w/o palpable nodularity  Cardiovascular: +S1/S2; RRR  Respiratory: CTA B/L; no W/R/R  Gastrointestinal: soft, NT/ND; +BSx4  Extremities: WWP; no edema, clubbing or cyanosis  Vascular: 2+ radial, DP/PT pulses B/L  Neurological: AAOx3; no focal deficits    MEDICATIONS:  MEDICATIONS  (STANDING):  amLODIPine   Tablet 10 milliGRAM(s) Oral every 24 hours  hydrALAZINE 75 milliGRAM(s) Oral every 8 hours  sodium bicarbonate 650 milliGRAM(s) Oral every 8 hours    MEDICATIONS  (PRN):  acetaminophen     Tablet .. 650 milliGRAM(s) Oral every 6 hours PRN Temp greater or equal to 38C (100.4F), Mild Pain (1 - 3)  ALPRAZolam 0.5 milliGRAM(s) Oral two times a day PRN Anxiety      ALLERGIES:  Allergies    No Known Allergies    Intolerances        LABS:                        7.0    10.80 )-----------( 357      ( 02 Jun 2022 05:30 )             21.2     06-02    136  |  107  |  26<H>  ----------------------------<  82  4.5   |  17<L>  |  3.80<H>    Ca    8.1<L>      02 Jun 2022 05:30  Phos  3.8     06-01  Mg     2.0     06-02    TPro  5.0<L>  /  Alb  2.7<L>  /  TBili  0.3  /  DBili  x   /  AST  9<L>  /  ALT  5<L>  /  AlkPhos  73  06-02        CAPILLARY BLOOD GLUCOSE          RADIOLOGY & ADDITIONAL TESTS: Reviewed.

## 2022-06-02 NOTE — PROGRESS NOTE ADULT - SUBJECTIVE AND OBJECTIVE BOX
71M PMH HTN presented for lower GI bleed likely diverticular and HTN urgency.   #HTN- BP stable on hydral 50q8 and amlodipine- likely continue on dc and ensure send to vivo  #BRPBR likely diverticular bleed now resolved, anemic this am but brown stool- f/u CBC after 1 unit but low c/f active bleed, f/u GI recs post scope  #ATN on top of baseline likely stage III CKD from HTN- downtrending creat, c/w bicarb on dc given acidosis    DVT px- SCDs given bleeding  Dispo- home today after scope if Hg stable with PCP f/u (will ensure PCP updated as in domi and appt made), ensure new meds for HTN and CKD available

## 2022-06-02 NOTE — PROGRESS NOTE ADULT - PROBLEM SELECTOR PLAN 1
GIB x2 at home, BRBPR with bowel movements and associated weakness, fatigue, and decreased appetite. No prior GIB; normal colonoscopy 6 yrs ago; no known family history of cancer. Hgb 5.4 MCV 90.7 on admission. Pantoprazole 80mg IV x1 then gtt then dc'd 5/31 per GI team.  s/p total of 4 units PRBC, last on 5/29, with Hb stable 7 to 8 since then with no further bleeding  Per GI, bleed etiology likely diverticular and mainstay of tx will be HTN control     Plan:  - Scope per GI - tentatively planned for thursday 6/2  - BP control as below  - maintain active T&S  - transfuse for Hgb<7
GIB x2 at home, BRBPR with bowel movements and associated weakness, fatigue, and decreased appetite. No prior GIB; normal colonoscopy 6 yrs ago; no known family history of cancer. Hgb 5.4 MCV 90.7 on admission. Pantoprazole 80mg IV x1 then gtt then dc'd 5/31 per GI team. s/p total of 4 units PRBC, last on 5/30, with Hb stable 7 to 8 since then with no further bleeding. Per GI, bleed etiology likely diverticular and mainstay of tx will be HTN control   - Hgb 7 on morning labs, patient asymptomatic. No melena or BRBPR -- administered 1U pRBC (likely attributable to lab error rather than ongoing GIB as patient remains asymptomatic and continues to improve clinically)   - f/u 2PM post transfusion CBC  - c-scope per GI - 6/2  - clear diet, NPO after midnight, 4L golytly starting at 5PM   - BP control as below  - maintain active T&S  - transfuse for Hgb<7    NOTE: PCP updated re antiHTN med titration and Cr follow up, f/u appt made, PSA ordered per PCP request
GIB x2 at home, BRBPR with bowel movements and associated weakness, fatigue, and decreased appetite. No prior GIB; normal colonoscopy 6 yrs ago; no known family history of cancer. Hgb 5.4 MCV 90.7 on admission. Pantoprazole 80mg IV x1 then gtt then dc'd 5/31 per GI team. s/p total of 4 units PRBC, last on 5/30, with Hb stable 7 to 8 since then with no further bleeding. Per GI, bleed etiology likely diverticular and mainstay of tx will be HTN control   - Scope per GI - tentatively planned for thursday 6/2  - clear diet, NPO after midnight, 4L golytly starting at 5PM   - BP control as below  - maintain active T&S  - transfuse for Hgb<7

## 2022-06-02 NOTE — PROGRESS NOTE ADULT - PROBLEM SELECTOR PLAN 7
F: None  E: Replete with caution in VAMSHI on CKD  N: Clears -> advance after scope  DVT: None due to bleed  Code: FULL

## 2022-06-03 ENCOUNTER — NON-APPOINTMENT (OUTPATIENT)
Age: 72
End: 2022-06-03

## 2022-06-03 LAB
ANION GAP SERPL CALC-SCNC: 8 MMOL/L — SIGNIFICANT CHANGE UP (ref 5–17)
BUN SERPL-MCNC: 28 MG/DL — HIGH (ref 7–23)
CALCIUM SERPL-MCNC: 8.7 MG/DL — SIGNIFICANT CHANGE UP (ref 8.4–10.5)
CHLORIDE SERPL-SCNC: 106 MMOL/L — SIGNIFICANT CHANGE UP (ref 96–108)
CO2 SERPL-SCNC: 26 MMOL/L — SIGNIFICANT CHANGE UP (ref 22–31)
CREAT SERPL-MCNC: 1.96 MG/DL — HIGH (ref 0.5–1.3)
EGFR: 36 ML/MIN/1.73M2 — LOW
GLUCOSE SERPL-MCNC: 97 MG/DL — SIGNIFICANT CHANGE UP (ref 70–99)
MAGNESIUM SERPL-MCNC: 2 MG/DL — SIGNIFICANT CHANGE UP (ref 1.6–2.6)
POTASSIUM SERPL-MCNC: 3.9 MMOL/L — SIGNIFICANT CHANGE UP (ref 3.5–5.3)
POTASSIUM SERPL-SCNC: 3.9 MMOL/L — SIGNIFICANT CHANGE UP (ref 3.5–5.3)
PSA FREE FLD-MCNC: 15 % — SIGNIFICANT CHANGE UP
PSA FREE FLD-MCNC: 2.25 NG/ML — SIGNIFICANT CHANGE UP
PSA SERPL-MCNC: 14.7 NG/ML — HIGH (ref 0–4)
SODIUM SERPL-SCNC: 140 MMOL/L — SIGNIFICANT CHANGE UP (ref 135–145)

## 2022-06-03 PROCEDURE — 99291 CRITICAL CARE FIRST HOUR: CPT

## 2022-06-03 PROCEDURE — 84154 ASSAY OF PSA FREE: CPT

## 2022-06-03 PROCEDURE — 82272 OCCULT BLD FECES 1-3 TESTS: CPT

## 2022-06-03 PROCEDURE — 83605 ASSAY OF LACTIC ACID: CPT

## 2022-06-03 PROCEDURE — 83735 ASSAY OF MAGNESIUM: CPT

## 2022-06-03 PROCEDURE — 86901 BLOOD TYPING SEROLOGIC RH(D): CPT

## 2022-06-03 PROCEDURE — 88305 TISSUE EXAM BY PATHOLOGIST: CPT

## 2022-06-03 PROCEDURE — P9016: CPT

## 2022-06-03 PROCEDURE — 85730 THROMBOPLASTIN TIME PARTIAL: CPT

## 2022-06-03 PROCEDURE — 86900 BLOOD TYPING SEROLOGIC ABO: CPT

## 2022-06-03 PROCEDURE — 83615 LACTATE (LD) (LDH) ENZYME: CPT

## 2022-06-03 PROCEDURE — 87086 URINE CULTURE/COLONY COUNT: CPT

## 2022-06-03 PROCEDURE — 80061 LIPID PANEL: CPT

## 2022-06-03 PROCEDURE — 36415 COLL VENOUS BLD VENIPUNCTURE: CPT

## 2022-06-03 PROCEDURE — 80048 BASIC METABOLIC PNL TOTAL CA: CPT

## 2022-06-03 PROCEDURE — 81001 URINALYSIS AUTO W/SCOPE: CPT

## 2022-06-03 PROCEDURE — 86923 COMPATIBILITY TEST ELECTRIC: CPT

## 2022-06-03 PROCEDURE — 84484 ASSAY OF TROPONIN QUANT: CPT

## 2022-06-03 PROCEDURE — 86850 RBC ANTIBODY SCREEN: CPT

## 2022-06-03 PROCEDURE — 76770 US EXAM ABDO BACK WALL COMP: CPT

## 2022-06-03 PROCEDURE — P9040: CPT

## 2022-06-03 PROCEDURE — 80053 COMPREHEN METABOLIC PANEL: CPT

## 2022-06-03 PROCEDURE — 87186 SC STD MICRODIL/AGAR DIL: CPT

## 2022-06-03 PROCEDURE — 84100 ASSAY OF PHOSPHORUS: CPT

## 2022-06-03 PROCEDURE — 84300 ASSAY OF URINE SODIUM: CPT

## 2022-06-03 PROCEDURE — 84153 ASSAY OF PSA TOTAL: CPT

## 2022-06-03 PROCEDURE — 83036 HEMOGLOBIN GLYCOSYLATED A1C: CPT

## 2022-06-03 PROCEDURE — 85610 PROTHROMBIN TIME: CPT

## 2022-06-03 PROCEDURE — 86803 HEPATITIS C AB TEST: CPT

## 2022-06-03 PROCEDURE — 85025 COMPLETE CBC W/AUTO DIFF WBC: CPT

## 2022-06-03 PROCEDURE — 71045 X-RAY EXAM CHEST 1 VIEW: CPT

## 2022-06-03 PROCEDURE — 84443 ASSAY THYROID STIM HORMONE: CPT

## 2022-06-03 PROCEDURE — 87635 SARS-COV-2 COVID-19 AMP PRB: CPT

## 2022-06-03 PROCEDURE — 85045 AUTOMATED RETICULOCYTE COUNT: CPT

## 2022-06-03 PROCEDURE — 83010 ASSAY OF HAPTOGLOBIN QUANT: CPT

## 2022-06-03 PROCEDURE — 85027 COMPLETE CBC AUTOMATED: CPT

## 2022-06-03 PROCEDURE — 36430 TRANSFUSION BLD/BLD COMPNT: CPT

## 2022-06-03 PROCEDURE — 82570 ASSAY OF URINE CREATININE: CPT

## 2022-06-06 LAB — SURGICAL PATHOLOGY STUDY: SIGNIFICANT CHANGE UP

## 2022-06-08 RX ORDER — OMEPRAZOLE 40 MG/1
40 CAPSULE, DELAYED RELEASE ORAL
Qty: 60 | Refills: 0 | Status: ACTIVE | COMMUNITY
Start: 2022-06-08 | End: 1900-01-01

## 2022-06-09 DIAGNOSIS — I12.9 HYPERTENSIVE CHRONIC KIDNEY DISEASE WITH STAGE 1 THROUGH STAGE 4 CHRONIC KIDNEY DISEASE, OR UNSPECIFIED CHRONIC KIDNEY DISEASE: ICD-10-CM

## 2022-06-09 DIAGNOSIS — D62 ACUTE POSTHEMORRHAGIC ANEMIA: ICD-10-CM

## 2022-06-09 DIAGNOSIS — R65.11 SYSTEMIC INFLAMMATORY RESPONSE SYNDROME (SIRS) OF NON-INFECTIOUS ORIGIN WITH ACUTE ORGAN DYSFUNCTION: ICD-10-CM

## 2022-06-09 DIAGNOSIS — E61.1 IRON DEFICIENCY: ICD-10-CM

## 2022-06-09 DIAGNOSIS — D75.839 THROMBOCYTOSIS, UNSPECIFIED: ICD-10-CM

## 2022-06-09 DIAGNOSIS — K57.31 DIVERTICULOSIS OF LARGE INTESTINE WITHOUT PERFORATION OR ABSCESS WITH BLEEDING: ICD-10-CM

## 2022-06-09 DIAGNOSIS — Z96.642 PRESENCE OF LEFT ARTIFICIAL HIP JOINT: ICD-10-CM

## 2022-06-09 DIAGNOSIS — K92.2 GASTROINTESTINAL HEMORRHAGE, UNSPECIFIED: ICD-10-CM

## 2022-06-09 DIAGNOSIS — N18.30 CHRONIC KIDNEY DISEASE, STAGE 3 UNSPECIFIED: ICD-10-CM

## 2022-06-09 DIAGNOSIS — Z96.611 PRESENCE OF RIGHT ARTIFICIAL SHOULDER JOINT: ICD-10-CM

## 2022-06-09 DIAGNOSIS — K29.71 GASTRITIS, UNSPECIFIED, WITH BLEEDING: ICD-10-CM

## 2022-06-09 DIAGNOSIS — B96.1 KLEBSIELLA PNEUMONIAE [K. PNEUMONIAE] AS THE CAUSE OF DISEASES CLASSIFIED ELSEWHERE: ICD-10-CM

## 2022-06-09 DIAGNOSIS — N39.0 URINARY TRACT INFECTION, SITE NOT SPECIFIED: ICD-10-CM

## 2022-06-09 DIAGNOSIS — K64.0 FIRST DEGREE HEMORRHOIDS: ICD-10-CM

## 2022-06-09 DIAGNOSIS — K29.81 DUODENITIS WITH BLEEDING: ICD-10-CM

## 2022-06-09 DIAGNOSIS — E87.2 ACIDOSIS: ICD-10-CM

## 2022-06-09 DIAGNOSIS — Z96.653 PRESENCE OF ARTIFICIAL KNEE JOINT, BILATERAL: ICD-10-CM

## 2022-06-09 DIAGNOSIS — N17.0 ACUTE KIDNEY FAILURE WITH TUBULAR NECROSIS: ICD-10-CM

## 2022-06-09 DIAGNOSIS — I16.1 HYPERTENSIVE EMERGENCY: ICD-10-CM

## 2022-06-10 ENCOUNTER — APPOINTMENT (OUTPATIENT)
Dept: INTERNAL MEDICINE | Facility: CLINIC | Age: 72
End: 2022-06-10

## 2022-06-13 ENCOUNTER — APPOINTMENT (OUTPATIENT)
Dept: INTERNAL MEDICINE | Facility: CLINIC | Age: 72
End: 2022-06-13
Payer: MEDICARE

## 2022-06-15 ENCOUNTER — APPOINTMENT (OUTPATIENT)
Dept: GASTROENTEROLOGY | Facility: CLINIC | Age: 72
End: 2022-06-15
Payer: MEDICARE

## 2022-06-15 VITALS
SYSTOLIC BLOOD PRESSURE: 147 MMHG | WEIGHT: 161 LBS | RESPIRATION RATE: 15 BRPM | HEART RATE: 114 BPM | BODY MASS INDEX: 23.85 KG/M2 | HEIGHT: 69 IN | DIASTOLIC BLOOD PRESSURE: 60 MMHG | OXYGEN SATURATION: 98 % | TEMPERATURE: 98.2 F

## 2022-06-15 PROCEDURE — 99203 OFFICE O/P NEW LOW 30 MIN: CPT

## 2022-06-16 NOTE — HISTORY OF PRESENT ILLNESS
[de-identified] : 70 yo M PMH HTN who presents for follow up after recent hospitalization from 5/28/22 to 6/2/22 for symptomatic anemia to Hgb 5.4 and hypertensive emergency with VAMSHI. Patient noted a few episodes of rectal bleeding prior to admission as well as one episode while in the hospital. \par \par Rectal bleeding though to be most likely diverticulosis. Had a c-scope in the hospital (see below). Prior to that had a colonoscopy 6 yrs prior with diverticulosis. His presentation was thought to be most likely consistent with a diverticular bleed. \par \par No aspirin or nsaid use per patient.\par \par Pt s/p EGD and colonoscopy with Ivanina\par \par EGD:\par - irregular Z line, bx to evaluate for bergman's esophagus\par - mild gastritis\par - mild duodenitis of bulb\par \par Colonoscopy:\par - internal hemorrhoids\par - diverticulosis of left, transverse, and right colon\par \par Patient reports first episode started of dark stool/dark blood in BM started May 19th. At first did not tell his kids.\par Happened again 2 days later. Made an appointment for PMD on May 27th. Had blood drawn and was sent to the ED. \par By the time presented to the ED, rectal bleeding had stopped. \par \par In the hospital had one more episode of rectal bleeding, that was red blood. \par \par Underwent EGD and Colonosocpy that showed: \par \par Stomach biopsy: gastric antral type  mucosa with mild acute and chronic inflammation and H pylori gastritis\par \par Distal esophagus: mild chronic cardioesophagitis \par \par Started medications for H pylori. After starting meds understood he might get nauseous. He didn't get tetracycline until \par \par Has been having a bad appetite for the past few months. Patient notes he stopped working and has less of an appetite since stopped working. \par \par He is feeling a lot better. He was feeling better yesterday. \par Only ate a little bit yesterday.\par Still has some looser stools at times. Sometimes a little dark, but no black stools and no blood. 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

## 2022-06-16 NOTE — ASSESSMENT
[FreeTextEntry1] : 70 yo M PMH HTN who presents for follow up after recent hospitalization from 5/28/22 to 6/2/22 for symptomatic anemia to Hgb 5.4 and hypertensive emergency with VAMSHI. Patient noted a few episodes of rectal bleeding prior to admission as well as one episode while in the hospital. \par \par Anemia, likely due to recent diverticular bleeding\par - In 2 months check iron studies, CBC\par - currently taking oral iron supplementation (with no side effect of constipation)\par - if iron low in 2 mos, consider referral for IV iron\par \par H pylori infection: \par - s/p EGD with H pylori on pathology\par - prescribed quadruple therapy however started with flagyl and omeprazole, recently picked up tetracycline from pharmacy and was never taking bismuth subsalicylate\par - unclear if above regimen will work since taking different things at vairous times. We discussed the option to just restart treatment, however patient prefers to check if possible clearance before starting another antibiotic salvage regimen\par \par Follow up in 2 months\par

## 2022-06-21 ENCOUNTER — APPOINTMENT (OUTPATIENT)
Dept: INTERNAL MEDICINE | Facility: CLINIC | Age: 72
End: 2022-06-21
Payer: MEDICARE

## 2022-06-21 VITALS
HEART RATE: 89 BPM | WEIGHT: 160 LBS | BODY MASS INDEX: 23.7 KG/M2 | OXYGEN SATURATION: 99 % | DIASTOLIC BLOOD PRESSURE: 80 MMHG | SYSTOLIC BLOOD PRESSURE: 140 MMHG | HEIGHT: 69 IN | TEMPERATURE: 98.8 F

## 2022-06-21 DIAGNOSIS — A04.8 OTHER SPECIFIED BACTERIAL INTESTINAL INFECTIONS: ICD-10-CM

## 2022-06-21 DIAGNOSIS — K92.2 GASTROINTESTINAL HEMORRHAGE, UNSPECIFIED: ICD-10-CM

## 2022-06-21 PROCEDURE — 99214 OFFICE O/P EST MOD 30 MIN: CPT

## 2022-06-22 LAB
ALBUMIN SERPL ELPH-MCNC: 3.7 G/DL
ALP BLD-CCNC: 137 U/L
ALT SERPL-CCNC: 13 U/L
ANION GAP SERPL CALC-SCNC: 16 MMOL/L
AST SERPL-CCNC: 24 U/L
BASOPHILS # BLD AUTO: 0.1 K/UL
BASOPHILS NFR BLD AUTO: 1 %
BILIRUB SERPL-MCNC: <0.2 MG/DL
BUN SERPL-MCNC: 42 MG/DL
CALCIUM SERPL-MCNC: 8.7 MG/DL
CHLORIDE SERPL-SCNC: 107 MMOL/L
CO2 SERPL-SCNC: 14 MMOL/L
CREAT SERPL-MCNC: 4.16 MG/DL
EGFR: 15 ML/MIN/1.73M2
EOSINOPHIL # BLD AUTO: 0.23 K/UL
EOSINOPHIL NFR BLD AUTO: 2.4 %
GLUCOSE SERPL-MCNC: 101 MG/DL
HCT VFR BLD CALC: 30.8 %
HGB BLD-MCNC: 9.3 G/DL
IMM GRANULOCYTES NFR BLD AUTO: 0.3 %
LYMPHOCYTES # BLD AUTO: 1.03 K/UL
LYMPHOCYTES NFR BLD AUTO: 10.8 %
MAN DIFF?: NORMAL
MCHC RBC-ENTMCNC: 28.9 PG
MCHC RBC-ENTMCNC: 30.2 GM/DL
MCV RBC AUTO: 95.7 FL
MONOCYTES # BLD AUTO: 0.64 K/UL
MONOCYTES NFR BLD AUTO: 6.7 %
NEUTROPHILS # BLD AUTO: 7.55 K/UL
NEUTROPHILS NFR BLD AUTO: 78.8 %
PLATELET # BLD AUTO: 498 K/UL
POTASSIUM SERPL-SCNC: 5.7 MMOL/L
PROT SERPL-MCNC: 6 G/DL
PSA FREE FLD-MCNC: NORMAL %
PSA FREE SERPL-MCNC: <0.01 NG/ML
PSA SERPL-MCNC: <0.01 NG/ML
RBC # BLD: 3.22 M/UL
RBC # FLD: 15.6 %
SODIUM SERPL-SCNC: 137 MMOL/L
WBC # FLD AUTO: 9.58 K/UL

## 2022-06-24 VITALS — BODY MASS INDEX: 24.4 KG/M2 | WEIGHT: 161 LBS | HEIGHT: 68 IN

## 2022-06-27 ENCOUNTER — APPOINTMENT (OUTPATIENT)
Dept: UROLOGY | Facility: CLINIC | Age: 72
End: 2022-06-27
Payer: MEDICARE

## 2022-06-27 VITALS — TEMPERATURE: 98.2 F | DIASTOLIC BLOOD PRESSURE: 78 MMHG | HEART RATE: 65 BPM | SYSTOLIC BLOOD PRESSURE: 123 MMHG

## 2022-06-27 DIAGNOSIS — Z86.79 PERSONAL HISTORY OF OTHER DISEASES OF THE CIRCULATORY SYSTEM: ICD-10-CM

## 2022-06-27 PROCEDURE — 99203 OFFICE O/P NEW LOW 30 MIN: CPT | Mod: 95

## 2022-06-27 RX ORDER — TADALAFIL 20 MG/1
20 TABLET ORAL
Qty: 10 | Refills: 1 | Status: COMPLETED | COMMUNITY
Start: 2018-12-24 | End: 2022-06-27

## 2022-06-27 RX ORDER — METRONIDAZOLE 250 MG/1
250 TABLET ORAL EVERY 6 HOURS
Qty: 56 | Refills: 0 | Status: COMPLETED | COMMUNITY
Start: 2022-06-08 | End: 2022-06-27

## 2022-06-27 RX ORDER — CARVEDILOL 25 MG/1
25 TABLET, FILM COATED ORAL
Qty: 180 | Refills: 3 | Status: COMPLETED | COMMUNITY
Start: 2017-06-05 | End: 2022-06-27

## 2022-06-27 RX ORDER — TETRACYCLINE HYDROCHLORIDE 500 MG/1
500 CAPSULE ORAL EVERY 6 HOURS
Qty: 56 | Refills: 0 | Status: COMPLETED | COMMUNITY
Start: 2022-06-08 | End: 2022-06-27

## 2022-06-27 RX ORDER — BISMUTH SUBSALICYLATE 262 MG
262 TABLET,CHEWABLE ORAL
Qty: 1 | Refills: 0 | Status: COMPLETED | COMMUNITY
Start: 2022-06-08 | End: 2022-06-27

## 2022-06-27 RX ORDER — SODIUM BICARBONATE 325 MG/1
325 TABLET ORAL
Refills: 0 | Status: ACTIVE | COMMUNITY

## 2022-06-27 NOTE — HISTORY OF PRESENT ILLNESS
[FreeTextEntry1] : Dear Dr. Lundberg (PCP)\par  \par Thank you so much for the referral to help care for your patient.\par  \par Chief Complaint: Elevated PSA\par Date of first visit: 06/27/2022\par Occupation: Retired\par He presents today accompanied by his daughter Martha.\par  \par SCAR VERGARA  is a 71 year old Black male with a PMHx of HTN and CHF who presents today for elevated PSA. His PSA was 17.30 ng/ml on 05/27/22, and a repeat PSA on 06/21/2022 resulted as <0.01 ng/ml. We are rechecking it today. He does not recall any previous PSA values. He has not had an MRI yet. He is biopsy naive. He denies a family history of  malignancies.\par \par He has no urinary complaints. He is happy with how he urinates. He denies dysuria and hematuria. \par  \par  \par PSA History\par < 0.01ng/mL on 06/21/2022\par 17.30 ng/mL ON 05/27/2022 \par  \par MRI History\par N/A\par  \par Biopsy History \par N/A\par \par The patient denies fevers, chills, nausea and or vomiting and no unexplained weight loss.\par  \par All pertinent laboratory results and physician notes were reviewed.  Questionnaire results were discussed with patient.\par  \par 06/24/2022\par IPSS 1 QOL 2\par SAROJ 1 (NSA)\par \par Prostate cancer screening: the patient and I spoke at length about prostate cancer screening, its risks and its benefits. The patient has 3 (age, race, elevated PSA) risk factors for prostate cancer.  He understands that many men with prostate cancer will die with the disease rather than of it and we also discussed the results large multi-center American and  prostate cancer screening trials. He also understands that PSA in and of itself does not diagnose prostate cancer but only assesses risk to a certain degree. The patient understands that to definitively screen for prostate cancer, a biopsy is required and this procedure has risks, including bleeding, infection, ED and urinary retention. The patient opted to proceed with a fusion biopsy.\par \par \par

## 2022-06-27 NOTE — PHYSICAL EXAM
[General Appearance - Well Developed] : well developed [General Appearance - Well Nourished] : well nourished [Normal Appearance] : normal appearance [Well Groomed] : well groomed [General Appearance - In No Acute Distress] : no acute distress [Edema] : no peripheral edema [Respiration, Rhythm And Depth] : normal respiratory rhythm and effort [Exaggerated Use Of Accessory Muscles For Inspiration] : no accessory muscle use [Abdomen Soft] : soft [Abdomen Tenderness] : non-tender [Costovertebral Angle Tenderness] : no ~M costovertebral angle tenderness [Penis Abnormality] : normal circumcised penis [Urinary Bladder Findings] : the bladder was normal on palpation [Scrotum] : the scrotum was normal [Testes Tenderness] : no tenderness of the testes [Testes Mass (___cm)] : there were no testicular masses [Prostate Tenderness] : the prostate was not tender [No Prostate Nodules] : no prostate nodules [Prostate Size ___ gm] : prostate size [unfilled] gm [Normal Station and Gait] : the gait and station were normal for the patient's age [] : no rash [No Focal Deficits] : no focal deficits [Oriented To Time, Place, And Person] : oriented to person, place, and time [Affect] : the affect was normal [Mood] : the mood was normal [Not Anxious] : not anxious [FreeTextEntry1] : neg JAGDISH 06/27/22

## 2022-06-27 NOTE — ASSESSMENT
[FreeTextEntry1] : 71 year old Black male with a PMHx of HTN and CHF who presents today for elevated PSA. His PSA was 17.30 ng/ml on 05/27/22, and a repeat PSA on 06/21/2022 resulted as <0.01 ng/ml. He does not recall any previous PSA values. He has not had an MRI yet. He is biopsy naive. He denies a family history of  malignancies.\par \par 1. Obtain MRI at [facility]\par  2. Schedule MRI Review Appt, TP Biopsy at  [facility], and Post Biopsy Appointment, all with Dr. Larsen. \par \par He understands that many men with prostate cancer will die with the disease rather than of it and we also discussed the results large multi-center American and  prostate cancer screening trials. He also understands that PSA in and of itself does not diagnose prostate cancer but only assesses risk to a certain degree. The patient understands that to definitively screen for prostate cancer, a biopsy is required and this procedure has risks, including bleeding, infection, ED and urinary retention. The patient opted to move forward with the biopsy.\par \par The patient is aware to expect hematuria x 2 weeks and up to 4 weeks of hematospermia.  There is a risk of infection albeit much lower than a transrectal approach. In some cases, patients can experience erectile dysfunction but this is usually self limiting.  Any fever/chills after the biopsy, the patient is to contact the office and go to the ER for an immediate evaluation. He has been given paper instructions outlining these items - which includes medications to avoid prior to surgery.\par  \par \par 1. CBC, BMP, PSA, Covid Test, UA UCx, EKG, Echo reports\par 2. Medical and Cardiac Clearance\par 3. TP biopsy at [facility]\par 4. Follow up 2 weeks after biopsy with his primary urologist or ourselves.\par 5. We will call with the path results once they are resulted.\par \par

## 2022-06-28 ENCOUNTER — NON-APPOINTMENT (OUTPATIENT)
Age: 72
End: 2022-06-28

## 2022-06-28 LAB
ANION GAP SERPL CALC-SCNC: 16 MMOL/L
BASOPHILS # BLD AUTO: 0.09 K/UL
BASOPHILS NFR BLD AUTO: 1 %
BUN SERPL-MCNC: 60 MG/DL
CALCIUM SERPL-MCNC: 9.1 MG/DL
CHLORIDE SERPL-SCNC: 106 MMOL/L
CO2 SERPL-SCNC: 13 MMOL/L
CREAT SERPL-MCNC: 4.75 MG/DL
EGFR: 12 ML/MIN/1.73M2
EOSINOPHIL # BLD AUTO: 0.25 K/UL
EOSINOPHIL NFR BLD AUTO: 2.6 %
GLUCOSE SERPL-MCNC: 91 MG/DL
HCT VFR BLD CALC: 30.9 %
HGB BLD-MCNC: 9.9 G/DL
IMM GRANULOCYTES NFR BLD AUTO: 0.3 %
LYMPHOCYTES # BLD AUTO: 1.14 K/UL
LYMPHOCYTES NFR BLD AUTO: 12 %
MAN DIFF?: NORMAL
MCHC RBC-ENTMCNC: 28.5 PG
MCHC RBC-ENTMCNC: 32 GM/DL
MCV RBC AUTO: 89 FL
MONOCYTES # BLD AUTO: 0.54 K/UL
MONOCYTES NFR BLD AUTO: 5.7 %
NEUTROPHILS # BLD AUTO: 7.42 K/UL
NEUTROPHILS NFR BLD AUTO: 78.4 %
PLATELET # BLD AUTO: 438 K/UL
POTASSIUM SERPL-SCNC: 5 MMOL/L
PSA SERPL-MCNC: 17.9 NG/ML
RBC # BLD: 3.47 M/UL
RBC # FLD: 15.8 %
SODIUM SERPL-SCNC: 136 MMOL/L
WBC # FLD AUTO: 9.47 K/UL

## 2022-07-07 ENCOUNTER — APPOINTMENT (OUTPATIENT)
Dept: MRI IMAGING | Facility: CLINIC | Age: 72
End: 2022-07-07

## 2022-07-07 ENCOUNTER — RESULT REVIEW (OUTPATIENT)
Age: 72
End: 2022-07-07

## 2022-07-07 ENCOUNTER — OUTPATIENT (OUTPATIENT)
Dept: OUTPATIENT SERVICES | Facility: HOSPITAL | Age: 72
LOS: 1 days | End: 2022-07-07

## 2022-07-07 DIAGNOSIS — Z96.642 PRESENCE OF LEFT ARTIFICIAL HIP JOINT: Chronic | ICD-10-CM

## 2022-07-07 DIAGNOSIS — Z96.611 PRESENCE OF RIGHT ARTIFICIAL SHOULDER JOINT: Chronic | ICD-10-CM

## 2022-07-07 DIAGNOSIS — Z96.653 PRESENCE OF ARTIFICIAL KNEE JOINT, BILATERAL: Chronic | ICD-10-CM

## 2022-07-07 PROCEDURE — 74018 RADEX ABDOMEN 1 VIEW: CPT | Mod: 26

## 2022-07-07 PROCEDURE — 72197 MRI PELVIS W/O & W/DYE: CPT | Mod: 26

## 2022-07-08 ENCOUNTER — NON-APPOINTMENT (OUTPATIENT)
Age: 72
End: 2022-07-08

## 2022-07-18 ENCOUNTER — APPOINTMENT (OUTPATIENT)
Dept: UROLOGY | Facility: CLINIC | Age: 72
End: 2022-07-18

## 2022-07-18 VITALS
OXYGEN SATURATION: 99 % | DIASTOLIC BLOOD PRESSURE: 74 MMHG | HEART RATE: 97 BPM | TEMPERATURE: 98 F | SYSTOLIC BLOOD PRESSURE: 139 MMHG

## 2022-07-18 DIAGNOSIS — R82.71 BACTERIURIA: ICD-10-CM

## 2022-07-18 DIAGNOSIS — R39.9 UNSPECIFIED SYMPTOMS AND SIGNS INVOLVING THE GENITOURINARY SYSTEM: ICD-10-CM

## 2022-07-18 PROCEDURE — 99214 OFFICE O/P EST MOD 30 MIN: CPT

## 2022-07-18 RX ORDER — TAMSULOSIN HYDROCHLORIDE 0.4 MG/1
0.4 CAPSULE ORAL
Qty: 90 | Refills: 3 | Status: ACTIVE | COMMUNITY
Start: 2022-07-18 | End: 1900-01-01

## 2022-07-19 PROBLEM — R82.71 BACTERIA IN URINE: Status: ACTIVE | Noted: 2022-07-19

## 2022-07-21 ENCOUNTER — APPOINTMENT (OUTPATIENT)
Dept: UROLOGY | Facility: AMBULATORY SURGERY CENTER | Age: 72
End: 2022-07-21

## 2022-08-05 ENCOUNTER — APPOINTMENT (OUTPATIENT)
Dept: GASTROENTEROLOGY | Facility: CLINIC | Age: 72
End: 2022-08-05

## 2022-08-05 VITALS
OXYGEN SATURATION: 99 % | DIASTOLIC BLOOD PRESSURE: 60 MMHG | BODY MASS INDEX: 23.64 KG/M2 | WEIGHT: 156 LBS | RESPIRATION RATE: 16 BRPM | HEART RATE: 92 BPM | SYSTOLIC BLOOD PRESSURE: 120 MMHG | TEMPERATURE: 97.3 F | HEIGHT: 68 IN

## 2022-08-05 PROCEDURE — 99214 OFFICE O/P EST MOD 30 MIN: CPT

## 2022-08-07 NOTE — HISTORY OF PRESENT ILLNESS
[de-identified] : 70 yo M PMH HTN who presents for follow up of symptomatic anemia and rectal bleeding. \par \par Since prior visit patient reports no rectal bleeding, no melena. \par \par Completed medications for H pylori about 1.5 weeks ago. Discussed checking for eradication approx 4 wks after treatment. \par \par INITIAL HPI\par 70 yo M PMH HTN who presents for follow up after recent hospitalization from 5/28/22 to 6/2/22 for symptomatic anemia to Hgb 5.4 and hypertensive emergency with VAMSHI. Patient noted a few episodes of rectal bleeding prior to admission as well as one episode while in the hospital. \par \par Rectal bleeding though to be most likely diverticulosis. Had a c-scope in the hospital (see below). Prior to that had a colonoscopy 6 yrs prior with diverticulosis. His presentation was thought to be most likely consistent with a diverticular bleed. \par \par No aspirin or nsaid use per patient.\par \par Pt s/p EGD and colonoscopy with Ivanina\par \par EGD:\par - irregular Z line, bx to evaluate for bergman's esophagus\par - mild gastritis\par - mild duodenitis of bulb\par \par Colonoscopy:\par - internal hemorrhoids\par - diverticulosis of left, transverse, and right colon\par \par Patient reports first episode started of dark stool/dark blood in BM started May 19th. At first did not tell his kids.\par Happened again 2 days later. Made an appointment for PMD on May 27th. Had blood drawn and was sent to the ED. \par By the time presented to the ED, rectal bleeding had stopped. \par \par In the hospital had one more episode of rectal bleeding, that was red blood. \par \par Underwent EGD and Colonosocpy that showed: \par \par Stomach biopsy: gastric antral type  mucosa with mild acute and chronic inflammation and H pylori gastritis\par \par Distal esophagus: mild chronic cardioesophagitis \par \par Started medications for H pylori. After starting meds understood he might get nauseous. He didn't get tetracycline until \par \par Has been having a bad appetite for the past few months. Patient notes he stopped working and has less of an appetite since stopped working. \par \par He is feeling a lot better. He was feeling better yesterday. \par Only ate a little bit yesterday.\par Still has some looser stools at times. Sometimes a little dark, but no black stools and no blood. 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

## 2022-08-07 NOTE — PHYSICAL EXAM
[General Appearance - Alert] : alert [General Appearance - In No Acute Distress] : in no acute distress [Sclera] : the sclera and conjunctiva were normal [PERRL With Normal Accommodation] : pupils were equal in size, round, and reactive to light [Extraocular Movements] : extraocular movements were intact [Outer Ear] : the ears and nose were normal in appearance [Oropharynx] : the oropharynx was normal [Neck Appearance] : the appearance of the neck was normal [Neck Cervical Mass (___cm)] : no neck mass was observed [Jugular Venous Distention Increased] : there was no jugular-venous distention [Thyroid Diffuse Enlargement] : the thyroid was not enlarged [Thyroid Nodule] : there were no palpable thyroid nodules [Auscultation Breath Sounds / Voice Sounds] : lungs were clear to auscultation bilaterally [Heart Rate And Rhythm] : heart rate was normal and rhythm regular [Heart Sounds Gallop] : no gallops [Heart Sounds] : normal S1 and S2 [Murmurs] : no murmurs [Heart Sounds Pericardial Friction Rub] : no pericardial rub [Bowel Sounds] : normal bowel sounds [Abdomen Soft] : soft [Abdomen Tenderness] : non-tender [Abdomen Mass (___ Cm)] : no abdominal mass palpated [No CVA Tenderness] : no ~M costovertebral angle tenderness [No Spinal Tenderness] : no spinal tenderness [Nail Clubbing] : no clubbing  or cyanosis of the fingernails [Abnormal Walk] : normal gait [Musculoskeletal - Swelling] : no joint swelling seen [Motor Tone] : muscle strength and tone were normal [Skin Color & Pigmentation] : normal skin color and pigmentation [Skin Turgor] : normal skin turgor [] : no rash [Oriented To Time, Place, And Person] : oriented to person, place, and time [Impaired Insight] : insight and judgment were intact [Affect] : the affect was normal

## 2022-08-07 NOTE — ASSESSMENT
[FreeTextEntry1] : 70 yo M PMH HTN who presents for follow up of symptomatic anemia and rectal bleeding. \par \par Anemia, likely due to recent diverticular bleeding\par - recent CBC significantly improved\par - needs repeat iron studies in 1-2 months, if persistently low consider referral for consideration of IV iron supplement\par - currently taking oral iron supplementation (with no side effect of constipation)\par \par H pylori infection: \par - s/p EGD with H pylori on pathology\par - completed regimen 1.5 weeks ago\par - breath test ordered, patient to get it Aug 17th or after (4 wks post-treatment\par \par Follow up in 6 mos or sooner PRN

## 2022-08-08 ENCOUNTER — APPOINTMENT (OUTPATIENT)
Dept: UROLOGY | Facility: CLINIC | Age: 72
End: 2022-08-08

## 2022-08-08 NOTE — HISTORY OF PRESENT ILLNESS
[FreeTextEntry1] : Dear Dr. Lundberg (PCP)\par  \par Thank you so much for the referral to help care for your patient.\par  \par Chief Complaint: Elevated PSA\par Date of first visit: 06/27/2022\par Occupation: Retired\par He presents today accompanied by his daughter Martha.\par  \par SCAR VERGARA  is a 71 year old Black male with a PMHx of HTN and CHF who presents today for elevated PSA. His PSA was 17.30 ng/ml on 05/27/22, and a repeat PSA on 06/21/2022 resulted as <0.01 ng/ml. We are rechecking it today. He does not recall any previous PSA values. He has not had an MRI yet. He is biopsy naive. He denies a family history of  malignancies.\par \par He has no urinary complaints. He is happy with how he urinates. He denies dysuria and hematuria. Has a positive cx from 5/29/22\par  \par  \par PSA History\par 17.90 06/27/2022\par < 0.01ng/mL on 06/21/2022\par 17.30 ng/mL ON 05/27/2022 \par  \par MRI History\par MRI read 07/08/2022. 5.3 x 4.3 x 4.4 cm; 52.4 cc prostate with PIRADS 2 no suspicious lesions. No LAD No EPE, No Bony Lesions.  The images have been reviewed and clinical implications discussed with the patient.\par  \par Biopsy History \par N/A\par \par The patient denies fevers, chills, nausea and or vomiting and no unexplained weight loss. Denies dysuria, voiding complaints.  Denies gross hematuria.  No family hx of prostate cancer\par  \par All pertinent laboratory results and physician notes were reviewed.  Questionnaire results were discussed with patient.\par  \par \par 07/18/2022\par IPSS 1   QOL\par SAROJ 8\par \par 06/24/2022\par IPSS 1 QOL 2\par SAROJ 1 (NSA)\par \par Prostate cancer screening: the patient and I spoke at length about prostate cancer screening, its risks and its benefits. The patient has 3 (age, race, elevated PSA) risk factors for prostate cancer.  He understands that many men with prostate cancer will die with the disease rather than of it and we also discussed the results large multi-center American and  prostate cancer screening trials. He also understands that PSA in and of itself does not diagnose prostate cancer but only assesses risk to a certain degree. The patient understands that to definitively screen for prostate cancer, a biopsy is required and this procedure has risks, including bleeding, infection, ED and urinary retention. The patient opted to proceed with a fusion biopsy.\par \par \par

## 2022-08-08 NOTE — ASSESSMENT
[FreeTextEntry1] : 71 year old Black male with a PMHx of HTN and CHF who presents today for elevated PSA. His PSA was 17.30 ng/ml on 05/27/22, and a repeat PSA on 06/21/2022 resulted as <0.01 ng/ml. He does not recall any previous PSA values.  He is biopsy naive. He denies a family history of  malignancies. The MRI was negative.\par \par 1. UA, Ucx\par 2. PVR = 154cc\par 3. Offered SelectMD, but pt declined\par 4. At this time will postpone prostate biopsy in light of positive Ucx previously and possibility of HERRERA\par 5. Flomax 0.4mg qHs - The patient understands that this medication may cause dizziness, retrograde ejaculation or nasal congestion among other complications. I recommended that the patient take this medication at night. If the side effects become too bothersome, the medication can be discontinued.\par \par He understands that many men with prostate cancer will die with the disease rather than of it and we also discussed the results large multi-center American and  prostate cancer screening trials. He also understands that PSA in and of itself does not diagnose prostate cancer but only assesses risk to a certain degree. The patient understands that to definitively screen for prostate cancer, a biopsy is required and this procedure has risks, including bleeding, infection, ED and urinary retention. The patient opted to move forward with the biopsy.\par \par The patient is aware to expect hematuria x 2 weeks and up to 4 weeks of hematospermia.  There is a risk of infection albeit much lower than a transrectal approach. In some cases, patients can experience erectile dysfunction but this is usually self limiting.  Any fever/chills after the biopsy, the patient is to contact the office and go to the ER for an immediate evaluation. He has been given paper instructions outlining these items - which includes medications to avoid prior to surgery.\par \par Thank you very much for allowing me to assist in the care of this patient. Should you have any additional questions or concerns please do not hesitate to contact me.\par \par \par Sincerely,\par \par \par Jalen Larsen D.O.\par  of Urology and Radiology\par  of Urology at [facility]\par System Director for Prostate Cancer\par [address], [address] [place], [address]\par Phone: [phone]\par \par \par

## 2022-08-08 NOTE — PHYSICAL EXAM
[General Appearance - Well Developed] : well developed [General Appearance - Well Nourished] : well nourished [Normal Appearance] : normal appearance [Well Groomed] : well groomed [General Appearance - In No Acute Distress] : no acute distress [Abdomen Soft] : soft [Abdomen Tenderness] : non-tender [Abdomen Mass (___ Cm)] : no abdominal mass palpated [Costovertebral Angle Tenderness] : no ~M costovertebral angle tenderness [Scrotum] : the scrotum was normal [Testes Mass (___cm)] : there were no testicular masses [Prostate Tenderness] : the prostate was not tender [No Prostate Nodules] : no prostate nodules [Skin Color & Pigmentation] : normal skin color and pigmentation [] : no respiratory distress [Exaggerated Use Of Accessory Muscles For Inspiration] : no accessory muscle use [Oriented To Time, Place, And Person] : oriented to person, place, and time [Affect] : the affect was normal [Mood] : the mood was normal [Not Anxious] : not anxious [Normal Station and Gait] : the gait and station were normal for the patient's age [No Focal Deficits] : no focal deficits [FreeTextEntry1] : mild urethral meatal stenosis, b/l small epididymal cysts, some firmness at R apex, but no nodules

## 2022-08-19 ENCOUNTER — TRANSCRIPTION ENCOUNTER (OUTPATIENT)
Age: 72
End: 2022-08-19

## 2022-08-24 ENCOUNTER — TRANSCRIPTION ENCOUNTER (OUTPATIENT)
Age: 72
End: 2022-08-24

## 2022-08-31 ENCOUNTER — APPOINTMENT (OUTPATIENT)
Dept: HEART AND VASCULAR | Facility: CLINIC | Age: 72
End: 2022-08-31

## 2022-09-14 ENCOUNTER — NON-APPOINTMENT (OUTPATIENT)
Age: 72
End: 2022-09-14

## 2022-09-30 NOTE — HISTORY OF PRESENT ILLNESS
[FreeTextEntry1] : Dear Dr. Lundberg (PCP)\par  \par Thank you so much for the referral to help care for your patient.\par  \par Chief Complaint: Elevated PSA\par Date of first visit: 06/27/2022\par Occupation: Retired\par  \par SCAR VERGARA  is a 71 year old Black male with a PMHx of HTN and CHF who presents today for elevated PSA. His PSA was 17.30 ng/ml on 05/27/22, and a repeat PSA on 06/21/2022 resulted as <0.01 ng/ml. We rechecked and resulted 17.9 ng/ml. He does not recall any previous PSA values. MRI on 7/8/22 was read as negative. He is biopsy naive. He denies a family history of  malignancies.\par \par Had a positive cx from 5/29/22. We discovered at his appt on 7/18/22 and gave him abx x 1 week. He was supposed to return for test of cure and repeat PSA and has been lost to follow up.\par \par Cr 4.75-- ??does he see nephro\par  \par PSA History\par 17.90 06/27/2022\par < 0.01ng/mL on 06/21/2022\par 17.30 ng/mL ON 05/27/2022 \par  \par MRI History\par MRI read 07/08/2022. 5.3 x 4.3 x 4.4 cm; 52.4 cc prostate with PIRADS 2 no suspicious lesions. No LAD No EPE, No Bony Lesions.  The images have been reviewed and clinical implications discussed with the patient.\par  \par Biopsy History \par N/A\par  \par 10/05/2022\par IPSS    QOL\par SAROJ \par \par 07/18/2022\par IPSS 1   QOL\par SAROJ 8\par \par 06/24/2022\par IPSS 1 QOL 2\par SAROJ 1 (NSA)\par \par Prostate cancer screening: the patient and I spoke at length about prostate cancer screening, its risks and its benefits. The patient has 3 (age, race, elevated PSA) risk factors for prostate cancer.  He understands that many men with prostate cancer will die with the disease rather than of it and we also discussed the results large multi-center American and  prostate cancer screening trials. He also understands that PSA in and of itself does not diagnose prostate cancer but only assesses risk to a certain degree. The patient understands that to definitively screen for prostate cancer, a biopsy is required and this procedure has risks, including bleeding, infection, ED and urinary retention. The patient opted to repeat urine and PSA before proceeding with biopsy.\par \par The patient denies fevers, chills, nausea and or vomiting and no unexplained weight loss. Denies dysuria, voiding complaints.  Denies gross hematuria.  No family hx of prostate cancer\par  \par All pertinent laboratory results and physician notes were reviewed.  Questionnaire results were discussed with patient.\par

## 2022-09-30 NOTE — ASSESSMENT
[FreeTextEntry1] : 71 year old Black male with a PMHx of HTN and CHF who presents today for elevated PSA. His PSA was 17.30 ng/ml on 05/27/22, and a repeat PSA on 06/21/2022 resulted as <0.01 ng/ml. He does not recall any previous PSA values.  He is biopsy naive. He denies a family history of  malignancies. The MRI was negative.\par \par 1. UA, Ucx\par 2. PVR = 154cc\par 3. Offered SelectMD, but pt declined\par 4. At this time will postpone prostate biopsy in light of positive Ucx previously and possibility of HERRERA\par 5. Flomax 0.4mg qHs - The patient understands that this medication may cause dizziness, retrograde ejaculation or nasal congestion among other complications. I recommended that the patient take this medication at night. If the side effects become too bothersome, the medication can be discontinued.\par \par He understands that many men with prostate cancer will die with the disease rather than of it and we also discussed the results large multi-center American and  prostate cancer screening trials. He also understands that PSA in and of itself does not diagnose prostate cancer but only assesses risk to a certain degree. The patient understands that to definitively screen for prostate cancer, a biopsy is required and this procedure has risks, including bleeding, infection, ED and urinary retention. The patient opted to move forward with the biopsy.\par \par The patient is aware to expect hematuria x 2 weeks and up to 4 weeks of hematospermia.  There is a risk of infection albeit much lower than a transrectal approach. In some cases, patients can experience erectile dysfunction but this is usually self limiting.  Any fever/chills after the biopsy, the patient is to contact the office and go to the ER for an immediate evaluation. He has been given paper instructions outlining these items - which includes medications to avoid prior to surgery.\par

## 2022-10-03 ENCOUNTER — NON-APPOINTMENT (OUTPATIENT)
Age: 72
End: 2022-10-03

## 2022-10-03 ENCOUNTER — APPOINTMENT (OUTPATIENT)
Dept: HEART AND VASCULAR | Facility: CLINIC | Age: 72
End: 2022-10-03

## 2022-10-03 VITALS
WEIGHT: 148 LBS | SYSTOLIC BLOOD PRESSURE: 168 MMHG | HEIGHT: 68 IN | HEART RATE: 115 BPM | DIASTOLIC BLOOD PRESSURE: 100 MMHG | OXYGEN SATURATION: 99 % | TEMPERATURE: 98.3 F | BODY MASS INDEX: 22.43 KG/M2

## 2022-10-03 DIAGNOSIS — R94.31 ABNORMAL ELECTROCARDIOGRAM [ECG] [EKG]: ICD-10-CM

## 2022-10-03 PROCEDURE — 93000 ELECTROCARDIOGRAM COMPLETE: CPT

## 2022-10-03 PROCEDURE — 99204 OFFICE O/P NEW MOD 45 MIN: CPT | Mod: 25

## 2022-10-03 NOTE — REASON FOR VISIT
[Symptom and Test Evaluation] : symptom and test evaluation [FreeTextEntry1] : 71 year old man comes in for a visit. Patient lost to follow up since 2018. He was seen in the hospital secondary to a GI bleed. No chest pain noted. Notes stable dyspnea. No palpitations or syncope. Not on any any meds at the moment.

## 2022-10-03 NOTE — DISCUSSION/SUMMARY
[FreeTextEntry1] : SOB/CHF check echo if able to approve and schedule.\par HTN restart prior meds\par EKG section of the chart --- secondary to symptoms above an electrocardiogram also known as an EKG was performed.  Risks and benefits discussed with the patient. Patient was given time and privacy to changed into a gown. Shortly after, standard 10 leads were applied and a [employer] system was used to perform the study. The results were subsequently reviewed by attending physician and discussed with the patient. The study showed a normal sinus rhythm and no ST-T suggestive of ischemia. LVH noted Order for the EKG was placed in the chart. The results were documented. Billing submitted. Emotional support provided.\par  [EKG obtained to assist in diagnosis and management of assessed problem(s)] : EKG obtained to assist in diagnosis and management of assessed problem(s)

## 2022-10-05 ENCOUNTER — APPOINTMENT (OUTPATIENT)
Dept: UROLOGY | Facility: CLINIC | Age: 72
End: 2022-10-05

## 2022-10-17 ENCOUNTER — APPOINTMENT (OUTPATIENT)
Dept: UROLOGY | Facility: CLINIC | Age: 72
End: 2022-10-17

## 2022-10-17 VITALS — DIASTOLIC BLOOD PRESSURE: 63 MMHG | HEART RATE: 76 BPM | SYSTOLIC BLOOD PRESSURE: 148 MMHG | TEMPERATURE: 98.2 F

## 2022-10-17 DIAGNOSIS — R97.20 ELEVATED PROSTATE, SPECIFIC ANTIGEN [PSA]: ICD-10-CM

## 2022-10-17 PROCEDURE — 99213 OFFICE O/P EST LOW 20 MIN: CPT

## 2022-10-18 ENCOUNTER — NON-APPOINTMENT (OUTPATIENT)
Age: 72
End: 2022-10-18

## 2022-10-18 LAB
PSA FREE FLD-MCNC: 17 %
PSA FREE SERPL-MCNC: 2.51 NG/ML
PSA SERPL-MCNC: 15 NG/ML

## 2022-10-20 ENCOUNTER — NON-APPOINTMENT (OUTPATIENT)
Age: 72
End: 2022-10-20

## 2022-10-20 LAB
APPEARANCE: CLEAR
BACTERIA: NEGATIVE
BILIRUBIN URINE: NEGATIVE
BLOOD URINE: NEGATIVE
COLOR: NORMAL
GLUCOSE QUALITATIVE U: NEGATIVE
HYALINE CASTS: 0 /LPF
KETONES URINE: NEGATIVE
LEUKOCYTE ESTERASE URINE: NEGATIVE
MICROSCOPIC-UA: NORMAL
NITRITE URINE: NEGATIVE
PH URINE: 6.5
PROTEIN URINE: ABNORMAL
RED BLOOD CELLS URINE: 0 /HPF
SPECIFIC GRAVITY URINE: 1.01
SQUAMOUS EPITHELIAL CELLS: 0 /HPF
UROBILINOGEN URINE: NORMAL
WHITE BLOOD CELLS URINE: 1 /HPF

## 2022-10-21 ENCOUNTER — NON-APPOINTMENT (OUTPATIENT)
Age: 72
End: 2022-10-21

## 2022-10-21 LAB — BACTERIA UR CULT: NORMAL

## 2022-10-21 NOTE — PHYSICAL EXAM
[General Appearance - Well Developed] : well developed [General Appearance - Well Nourished] : well nourished [Normal Appearance] : normal appearance [Well Groomed] : well groomed [General Appearance - In No Acute Distress] : no acute distress [Abdomen Soft] : soft [Abdomen Tenderness] : non-tender [Costovertebral Angle Tenderness] : no ~M costovertebral angle tenderness [Edema] : no peripheral edema [] : no respiratory distress [Respiration, Rhythm And Depth] : normal respiratory rhythm and effort [Exaggerated Use Of Accessory Muscles For Inspiration] : no accessory muscle use [Oriented To Time, Place, And Person] : oriented to person, place, and time [Affect] : the affect was normal [Mood] : the mood was normal [Normal Station and Gait] : the gait and station were normal for the patient's age [No Focal Deficits] : no focal deficits [FreeTextEntry1] : Appears anxious

## 2022-10-21 NOTE — ASSESSMENT
[FreeTextEntry1] : PVR 07/18/22- 154 ml\par Bladder volume 10/17/22- 195 ml (he did not void, he does not have the urge to urinate)\par \par 71 year old Black male with a PMHx of HTN and CHF who presents today for elevated PSA. His PSA in 05/2022 was 17.30 ng/ml, however this was in the setting of a positive urine culture during his hospital admission in 05/2022. A repeat PSA on 06/27/22 was 17.90 ng/ml, and we started Bactrim, with plans to repeat the PSA, and obtain a test of cure. His MRI was read as PIRADS 2, however I feel the hip replacement may have limited the ability to see the left TZa lesion (image center #19), left mid-pzpm lesion #20. Biopsy naive, negative family history. Negative JAGDISH 06/27/22. Minimal LUTS, but baseline residuals are ~150 ml. He started tamsulosin last month and is happy with that. \par \par 1. Elevated PSA- MRI was read as PIRADS 2, however I feel the hip replacement may have limited the ability to see the left TZa lesion (image center #19), left mid-pzpm lesion #20. Today 10/17/22, we will obtain a repeat PSA, UA, and UCx. The patient insists that he does not have the urge to urinate in office, and wishes to provide a urine sample at [facility] lab, which is close to where he lives. We provided him with the urine sample supplies and the lab requisition. We discussed at length the importance of obtaining a test of cure and rechecking his PSA, in order to further assess his risk for prostate cancer. \par 2. Elevated bladder residual- Continue tamsulosin 0.4 mg daily. \par 3. RTC in 1 month 11/2022. \par \par He understands that many men with prostate cancer will die with the disease rather than of it and we also discussed the results large multi-center American and  prostate cancer screening trials. He also understands that PSA in and of itself does not diagnose prostate cancer but only assesses risk to a certain degree. The patient understands that to definitively screen for prostate cancer, a biopsy is required and this procedure has risks, including bleeding, infection, ED and urinary retention. The patient opted to repeat a UA, UCx, and PSA.\par \par Thank you very much for allowing me to assist in the care of this patient. Should you have any additional questions or concerns please do not hesitate to contact me.\par \par \par Sincerely,\par \par \par Jalen Larsen D.O.\par  of Urology and Radiology\par  of Urology at [facility]\par System Director for Prostate Cancer\par [address] [place] [address], [address]th Floor [facility], [facility]\par Phone: [phone]\par \par \par

## 2022-10-21 NOTE — ADDENDUM
[FreeTextEntry1] : I, Dr. Larsen, personally performed the evaluation and management (E/M) services for this established patient who presents today with (a) new problem(s)/exacerbation of (an) existing condition(s).  That E/M includes conducting the examination, assessing all new/exacerbated conditions, and establishing a new plan of care.  Today, my ACP, Jaky Caballero NP, was here to observe my evaluation and management services for this new problem/exacerbated condition to be followed going forward\par \par

## 2022-10-21 NOTE — HISTORY OF PRESENT ILLNESS
[FreeTextEntry1] : Dear Dr. 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 His most recent PSA is 17.90 ng/ml. MRI done on 07/08/2022 was read as PIRADS 2, no suspicious lesions, and we agree with the read. When we first met him in 06/2022, his PSA was 17.30 ng/ml, however, he had a positive urine culture during a hospital admission in 05/2022. We treated him with Bactrim x 7 days and planned to obtain a test of cure and repeat PSA, but, the patient was lost to follow up. He is biopsy naive. He denies a family history of  malignancies.\par \par He offers minimal urinary complaints. He denies frequency and urgency, but does experience incomplete bladder emptying-his PVRs in office have been ~150 ml. He denies dysuria and hematuria. He started tamsulosin last month(09/2022) and feels that he now has a stronger force of stream. Nocturia x 1. He is happy on the tamsulosin.\par \par He has been lost to follow up, having "no showed" for his last two follow up appointments. He is very anxious and apprehensive in the office today. \par  \par PSA History\par 17.90 06/27/2022 PSAD 0.34 ng/ml/cc\par < 0.01ng/mL on 06/21/2022\par 17.30 ng/mL ON 05/27/2022 \par  \par MRI History\par MRI read 07/08/2022. 5.3 x 4.3 x 4.4 cm; 52.4 cc prostate with PIRADS 2, however I feel the hip replacement may have limited the ability to see the left TZa lesion (image center #19), left mid-pzpm lesion #20. No LAD No EPE, No Bony Lesions. The images have been reviewed and clinical implications discussed with the patient.\par \par Xray Hx\par 07/07/2022 Xray Kidney Ureter Bladder\par Frontal view of the abdomen demonstrates total left hip arthroplasty. Severe degenerative change of the right hip. Negative for endoscopy clip. Elevation of the right hemidiaphragm. Appropriate bowel gas pattern. Degenerative change of the lower lumbar spine.\par  \par Biopsy History \par N/A\par \par The patient denies fevers, chills, nausea and or vomiting and no unexplained weight loss. Denies dysuria, voiding complaints.  Denies gross hematuria.  No family hx of prostate cancer\par  \par All pertinent laboratory results and physician notes were reviewed.  Questionnaire results were discussed with patient.\par  \par \par 10/17/2022\par IPSS 4 QOL 1\par SAROJ NSA\par \par 07/18/2022\par IPSS 1   QOL\par SAROJ 8\par \par 06/24/2022\par IPSS 1 QOL 2\par SAROJ 1 (NSA)\par \par Prostate cancer screening: the patient and I spoke at length about prostate cancer screening, its risks and its benefits. The patient has 3 (age, race, elevated PSA) risk factors for prostate cancer.  He understands that many men with prostate cancer will die with the disease rather than of it and we also discussed the results large multi-center American and  prostate cancer screening trials. He also understands that PSA in and of itself does not diagnose prostate cancer but only assesses risk to a certain degree. The patient understands that to definitively screen for prostate cancer, a biopsy is required and this procedure has risks, including bleeding, infection, ED and urinary retention. The patient opted to repeat a UA, UCx, and PSA today.\par \par \par

## 2022-10-24 ENCOUNTER — NON-APPOINTMENT (OUTPATIENT)
Age: 72
End: 2022-10-24

## 2022-11-11 ENCOUNTER — APPOINTMENT (OUTPATIENT)
Dept: HEART AND VASCULAR | Facility: CLINIC | Age: 72
End: 2022-11-11

## 2022-11-21 ENCOUNTER — APPOINTMENT (OUTPATIENT)
Dept: UROLOGY | Facility: CLINIC | Age: 72
End: 2022-11-21

## 2022-12-12 ENCOUNTER — APPOINTMENT (OUTPATIENT)
Dept: UROLOGY | Facility: CLINIC | Age: 72
End: 2022-12-12

## 2022-12-12 NOTE — ASSESSMENT
[FreeTextEntry1] : PVR 07/18/22- 154 ml\par Bladder volume 10/17/22- 195 ml (he did not void, he does not have the urge to urinate)\par \par 71 year old Black male with a PMHx of HTN and CHF who presents today for elevated PSA. His PSA in 05/2022 was 17.30 ng/ml, however this was in the setting of a positive urine culture during his hospital admission in 05/2022. A repeat PSA on 06/27/22 was 17.90 ng/ml, and we started Bactrim, with plans to repeat the PSA, and obtain a test of cure. His MRI was read as PIRADS 2, however I feel the hip replacement may have limited the ability to see the left TZa lesion (image center #19), left mid-pzpm lesion #20. Biopsy naive, negative family history. Negative JAGDISH 06/27/22. Minimal LUTS, but baseline residuals are ~150 ml. He started tamsulosin last month and is happy with that. \par \par 1. Elevated PSA- MRI was read as PIRADS 2, however I feel the hip replacement may have limited the ability to see the left TZa lesion (image center #19), left mid-pzpm lesion #20. Today 10/17/22, we will obtain a repeat PSA, UA, and UCx. The patient insists that he does not have the urge to urinate in office, and wishes to provide a urine sample at [facility] lab, which is close to where he lives. We provided him with the urine sample supplies and the lab requisition. We discussed at length the importance of obtaining a test of cure and rechecking his PSA, in order to further assess his risk for prostate cancer. \par 2. Elevated bladder residual- Continue tamsulosin 0.4 mg daily. \par 3. RTC in 1 month 11/2022. \par \par He understands that many men with prostate cancer will die with the disease rather than of it and we also discussed the results large multi-center American and  prostate cancer screening trials. He also understands that PSA in and of itself does not diagnose prostate cancer but only assesses risk to a certain degree. The patient understands that to definitively screen for prostate cancer, a biopsy is required and this procedure has risks, including bleeding, infection, ED and urinary retention. The patient opted to repeat a UA, UCx, and PSA.\par \par Thank you very much for allowing me to assist in the care of this patient. Should you have any additional questions or concerns please do not hesitate to contact me.\par \par \par Sincerely,\par \par \par Jalen Larsen D.O.\par  of Urology and Radiology\par  of Urology at [facility]\par System Director for Prostate Cancer\par [address] [place] [address], 5th Floor [facility], [facility]\par Phone: [phone]\par \par \par

## 2022-12-12 NOTE — HISTORY OF PRESENT ILLNESS
[FreeTextEntry1] : Dear Dr. 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 His most recent PSA is 17.90 ng/ml. MRI done on 07/08/2022 was read as PIRADS 2, no suspicious lesions, and we agree with the read. When we first met him in 06/2022, his PSA was 17.30 ng/ml, however, he had a positive urine culture during a hospital admission in 05/2022. We treated him with Bactrim x 7 days and planned to obtain a test of cure and repeat PSA, but, the patient was lost to follow up. He is biopsy naive. He denies a family history of  malignancies.\par \par He offers minimal urinary complaints. He denies frequency and urgency, but does experience incomplete bladder emptying-his PVRs in office have been ~150 ml. He denies dysuria and hematuria. He started tamsulosin last month(09/2022) and feels that he now has a stronger force of stream. Nocturia x 1. He is happy on the tamsulosin.\par \par He has been lost to follow up, having "no showed" for his last two follow up appointments. He is very anxious and apprehensive in the office today. \par  \par PSA History\par 15.00 10/18/2022\par 17.90 06/27/2022 PSAD 0.34 ng/ml/cc\par < 0.01ng/mL on 06/21/2022\par 17.30 ng/mL ON 05/27/2022 \par  \par MRI History\par MRI read 07/08/2022. 5.3 x 4.3 x 4.4 cm; 52.4 cc prostate with PIRADS 2, however I feel the hip replacement may have limited the ability to see the left TZa lesion (image center #19), left mid-pzpm lesion #20. Also adding R mid pzpl (image 22).  No LAD No EPE, No Bony Lesions. The images have been reviewed and clinical implications discussed with the patient.\par \par Xray Hx\par 07/07/2022 Xray Kidney Ureter Bladder\par Frontal view of the abdomen demonstrates total left hip arthroplasty. Severe degenerative change of the right hip. Negative for endoscopy clip. Elevation of the right hemidiaphragm. Appropriate bowel gas pattern. Degenerative change of the lower lumbar spine.\par  \par Biopsy History \par N/A\par \par The patient denies fevers, chills, nausea and or vomiting and no unexplained weight loss. Denies dysuria, voiding complaints.  Denies gross hematuria.  No family hx of prostate cancer\par  \par All pertinent laboratory results and physician notes were reviewed.  Questionnaire results were discussed with patient.\par  \par \par 12/12/2022\par IPSS      QOL\par SAROJ\par \par 10/17/2022\par IPSS 4 QOL 1\par SAROJ NSA\par \par 07/18/2022\par IPSS 1   QOL\par SAROJ 8\par \par 06/24/2022\par IPSS 1 QOL 2\par SAROJ 1 (NSA)\par \par Prostate cancer screening: the patient and I spoke at length about prostate cancer screening, its risks and its benefits. The patient has 3 (age, race, elevated PSA) risk factors for prostate cancer.  He understands that many men with prostate cancer will die with the disease rather than of it and we also discussed the results large multi-center American and  prostate cancer screening trials. He also understands that PSA in and of itself does not diagnose prostate cancer but only assesses risk to a certain degree. The patient understands that to definitively screen for prostate cancer, a biopsy is required and this procedure has risks, including bleeding, infection, ED and urinary retention. The patient opted to repeat a UA, UCx, and PSA today.\par \par \par

## 2022-12-19 ENCOUNTER — RX RENEWAL (OUTPATIENT)
Age: 72
End: 2022-12-19

## 2022-12-21 ENCOUNTER — RX RENEWAL (OUTPATIENT)
Age: 72
End: 2022-12-21

## 2023-02-03 ENCOUNTER — APPOINTMENT (OUTPATIENT)
Dept: GASTROENTEROLOGY | Facility: CLINIC | Age: 73
End: 2023-02-03

## 2023-03-01 ENCOUNTER — TRANSCRIPTION ENCOUNTER (OUTPATIENT)
Age: 73
End: 2023-03-01

## 2023-03-01 ENCOUNTER — RX RENEWAL (OUTPATIENT)
Age: 73
End: 2023-03-01

## 2023-03-10 ENCOUNTER — NON-APPOINTMENT (OUTPATIENT)
Age: 73
End: 2023-03-10

## 2023-10-11 NOTE — PATIENT PROFILE ADULT - FALL HARM RISK - RISK INTERVENTIONS
Pre injection questionnaire was reviewed.  1. Have you had increased asthma symptoms (chest tightness, increased cough, wheezing or felt short of breath) in the past week? no  2. Have you had a marked increase in allergy symptoms(itchy eyes or nose, sneezing, runny nose, post nasal drip or throat clearing) in the past week?  no  3. Do you have a cold or respiratory tract infection, or flu-like symptoms? no  4. Did you have any problems such as increased allergy or asthma symptoms, hives or generalized itching within 12 hours of receiving your allergy injection or swelling that persisted into the next day? no  5.Are you on any new medications? Any new eye drops? Any new blood pressure or migraine medications? no  If yes,please specify____________________________________   6. Are you taking your allergy medicine as prescribed? yes   Assistance OOB with selected safe patient handling equipment/Assistance with ambulation/Communicate Fall Risk and Risk Factors to all staff, patient, and family/Reinforce activity limits and safety measures with patient and family/Visual Cue: Yellow wristband/Bed in lowest position, wheels locked, appropriate side rails in place/Call bell, personal items and telephone in reach/Instruct patient to call for assistance before getting out of bed or chair/Non-slip footwear when patient is out of bed/[place] to call system/Physically safe environment - no spills, clutter or unnecessary equipment/Purposeful Proactive Rounding/Room/bathroom lighting operational, light cord in reach

## 2024-02-04 ENCOUNTER — INPATIENT (INPATIENT)
Facility: HOSPITAL | Age: 74
LOS: 1 days | Discharge: HOME | DRG: 280 | End: 2024-02-06
Attending: INTERNAL MEDICINE | Admitting: INTERNAL MEDICINE
Payer: MEDICARE

## 2024-02-04 VITALS
TEMPERATURE: 98 F | SYSTOLIC BLOOD PRESSURE: 234 MMHG | OXYGEN SATURATION: 94 % | RESPIRATION RATE: 16 BRPM | DIASTOLIC BLOOD PRESSURE: 125 MMHG | HEART RATE: 113 BPM

## 2024-02-04 DIAGNOSIS — Z96.653 PRESENCE OF ARTIFICIAL KNEE JOINT, BILATERAL: Chronic | ICD-10-CM

## 2024-02-04 DIAGNOSIS — Z96.642 PRESENCE OF LEFT ARTIFICIAL HIP JOINT: Chronic | ICD-10-CM

## 2024-02-04 DIAGNOSIS — Z96.611 PRESENCE OF RIGHT ARTIFICIAL SHOULDER JOINT: Chronic | ICD-10-CM

## 2024-02-04 DIAGNOSIS — I50.9 HEART FAILURE, UNSPECIFIED: ICD-10-CM

## 2024-02-04 DIAGNOSIS — I16.0 HYPERTENSIVE URGENCY: ICD-10-CM

## 2024-02-04 DIAGNOSIS — N17.9 ACUTE KIDNEY FAILURE, UNSPECIFIED: ICD-10-CM

## 2024-02-04 LAB
ALBUMIN SERPL ELPH-MCNC: 3.2 G/DL — LOW (ref 3.4–5)
ALP SERPL-CCNC: 135 U/L — HIGH (ref 40–120)
ALT FLD-CCNC: 20 U/L — SIGNIFICANT CHANGE UP (ref 12–42)
ANION GAP SERPL CALC-SCNC: 16 MMOL/L — SIGNIFICANT CHANGE UP (ref 9–16)
APPEARANCE UR: CLEAR — SIGNIFICANT CHANGE UP
AST SERPL-CCNC: 30 U/L — SIGNIFICANT CHANGE UP (ref 15–37)
BACTERIA # UR AUTO: NEGATIVE /HPF — SIGNIFICANT CHANGE UP
BASOPHILS # BLD AUTO: 0.05 K/UL — SIGNIFICANT CHANGE UP (ref 0–0.2)
BASOPHILS NFR BLD AUTO: 0.5 % — SIGNIFICANT CHANGE UP (ref 0–2)
BILIRUB SERPL-MCNC: 0.5 MG/DL — SIGNIFICANT CHANGE UP (ref 0.2–1.2)
BILIRUB UR-MCNC: NEGATIVE — SIGNIFICANT CHANGE UP
BUN SERPL-MCNC: 51 MG/DL — HIGH (ref 7–23)
CALCIUM SERPL-MCNC: 8.8 MG/DL — SIGNIFICANT CHANGE UP (ref 8.5–10.5)
CAST: 0 /LPF — SIGNIFICANT CHANGE UP (ref 0–4)
CHLORIDE SERPL-SCNC: 104 MMOL/L — SIGNIFICANT CHANGE UP (ref 96–108)
CO2 SERPL-SCNC: 13 MMOL/L — LOW (ref 22–31)
COLOR SPEC: YELLOW — SIGNIFICANT CHANGE UP
CREAT ?TM UR-MCNC: 23 MG/DL — SIGNIFICANT CHANGE UP
CREAT SERPL-MCNC: 4.2 MG/DL — HIGH (ref 0.5–1.3)
DIFF PNL FLD: NEGATIVE — SIGNIFICANT CHANGE UP
EGFR: 14 ML/MIN/1.73M2 — LOW
EOSINOPHIL # BLD AUTO: 0.11 K/UL — SIGNIFICANT CHANGE UP (ref 0–0.5)
EOSINOPHIL NFR BLD AUTO: 1.2 % — SIGNIFICANT CHANGE UP (ref 0–6)
FLUAV AG NPH QL: SIGNIFICANT CHANGE UP
FLUBV AG NPH QL: SIGNIFICANT CHANGE UP
GLUCOSE SERPL-MCNC: 124 MG/DL — HIGH (ref 70–99)
GLUCOSE UR QL: NEGATIVE MG/DL — SIGNIFICANT CHANGE UP
HCT VFR BLD CALC: 33.4 % — LOW (ref 39–50)
HGB BLD-MCNC: 10.8 G/DL — LOW (ref 13–17)
IMM GRANULOCYTES NFR BLD AUTO: 0.3 % — SIGNIFICANT CHANGE UP (ref 0–0.9)
KETONES UR-MCNC: NEGATIVE MG/DL — SIGNIFICANT CHANGE UP
LACTATE BLDV-MCNC: 1.8 MMOL/L — SIGNIFICANT CHANGE UP (ref 0.5–2)
LEUKOCYTE ESTERASE UR-ACNC: ABNORMAL
LYMPHOCYTES # BLD AUTO: 1.04 K/UL — SIGNIFICANT CHANGE UP (ref 1–3.3)
LYMPHOCYTES # BLD AUTO: 10.9 % — LOW (ref 13–44)
MAGNESIUM SERPL-MCNC: 1.8 MG/DL — SIGNIFICANT CHANGE UP (ref 1.6–2.6)
MCHC RBC-ENTMCNC: 27.1 PG — SIGNIFICANT CHANGE UP (ref 27–34)
MCHC RBC-ENTMCNC: 32.3 GM/DL — SIGNIFICANT CHANGE UP (ref 32–36)
MCV RBC AUTO: 83.9 FL — SIGNIFICANT CHANGE UP (ref 80–100)
MONOCYTES # BLD AUTO: 0.48 K/UL — SIGNIFICANT CHANGE UP (ref 0–0.9)
MONOCYTES NFR BLD AUTO: 5.1 % — SIGNIFICANT CHANGE UP (ref 2–14)
NEUTROPHILS # BLD AUTO: 7.79 K/UL — HIGH (ref 1.8–7.4)
NEUTROPHILS NFR BLD AUTO: 82 % — HIGH (ref 43–77)
NITRITE UR-MCNC: NEGATIVE — SIGNIFICANT CHANGE UP
NRBC # BLD: 0 /100 WBCS — SIGNIFICANT CHANGE UP (ref 0–0)
NT-PROBNP SERPL-SCNC: HIGH PG/ML
OSMOLALITY UR: 238 MOSM/KG — LOW (ref 300–900)
PCO2 BLDV: 29 MMHG — LOW (ref 42–55)
PH BLDV: 7.25 — LOW (ref 7.32–7.43)
PH UR: 6.5 — SIGNIFICANT CHANGE UP (ref 5–8)
PLATELET # BLD AUTO: 260 K/UL — SIGNIFICANT CHANGE UP (ref 150–400)
PO2 BLDV: 47 MMHG — HIGH (ref 25–45)
POTASSIUM SERPL-MCNC: 5.1 MMOL/L — SIGNIFICANT CHANGE UP (ref 3.5–5.3)
POTASSIUM SERPL-SCNC: 5.1 MMOL/L — SIGNIFICANT CHANGE UP (ref 3.5–5.3)
POTASSIUM UR-SCNC: 7 MMOL/L — SIGNIFICANT CHANGE UP
PROT ?TM UR-MCNC: 55 MG/DL — HIGH (ref 0–12)
PROT SERPL-MCNC: 6.7 G/DL — SIGNIFICANT CHANGE UP (ref 6.4–8.2)
PROT UR-MCNC: 100 MG/DL
PROT/CREAT UR-RTO: 2.4 RATIO — HIGH (ref 0–0.2)
RBC # BLD: 3.98 M/UL — LOW (ref 4.2–5.8)
RBC # FLD: 14.9 % — HIGH (ref 10.3–14.5)
RBC CASTS # UR COMP ASSIST: 0 /HPF — SIGNIFICANT CHANGE UP (ref 0–4)
RSV RNA NPH QL NAA+NON-PROBE: SIGNIFICANT CHANGE UP
SAO2 % BLDV: 73.8 % — SIGNIFICANT CHANGE UP (ref 67–88)
SARS-COV-2 RNA SPEC QL NAA+PROBE: SIGNIFICANT CHANGE UP
SODIUM SERPL-SCNC: 133 MMOL/L — SIGNIFICANT CHANGE UP (ref 132–145)
SODIUM UR-SCNC: 91 MMOL/L — SIGNIFICANT CHANGE UP
SP GR SPEC: 1.01 — SIGNIFICANT CHANGE UP (ref 1–1.03)
SQUAMOUS # UR AUTO: 0 /HPF — SIGNIFICANT CHANGE UP (ref 0–5)
TROPONIN I, HIGH SENSITIVITY RESULT: 154.8 NG/L — HIGH
UROBILINOGEN FLD QL: 0.2 MG/DL — SIGNIFICANT CHANGE UP (ref 0.2–1)
UUN UR-MCNC: 121 MG/DL — SIGNIFICANT CHANGE UP
WBC # BLD: 9.5 K/UL — SIGNIFICANT CHANGE UP (ref 3.8–10.5)
WBC # FLD AUTO: 9.5 K/UL — SIGNIFICANT CHANGE UP (ref 3.8–10.5)
WBC UR QL: 38 /HPF — HIGH (ref 0–5)

## 2024-02-04 PROCEDURE — 99285 EMERGENCY DEPT VISIT HI MDM: CPT

## 2024-02-04 PROCEDURE — 71046 X-RAY EXAM CHEST 2 VIEWS: CPT | Mod: 26

## 2024-02-04 PROCEDURE — 70450 CT HEAD/BRAIN W/O DYE: CPT | Mod: 26

## 2024-02-04 RX ORDER — HYDRALAZINE HCL 50 MG
25 TABLET ORAL THREE TIMES A DAY
Refills: 0 | Status: DISCONTINUED | OUTPATIENT
Start: 2024-02-04 | End: 2024-02-05

## 2024-02-04 RX ORDER — METOPROLOL TARTRATE 50 MG
5 TABLET ORAL ONCE
Refills: 0 | Status: DISCONTINUED | OUTPATIENT
Start: 2024-02-04 | End: 2024-02-04

## 2024-02-04 RX ORDER — TAMSULOSIN HYDROCHLORIDE 0.4 MG/1
0.4 CAPSULE ORAL AT BEDTIME
Refills: 0 | Status: DISCONTINUED | OUTPATIENT
Start: 2024-02-04 | End: 2024-02-06

## 2024-02-04 RX ORDER — NITROGLYCERIN 6.5 MG
0.4 CAPSULE, EXTENDED RELEASE ORAL ONCE
Refills: 0 | Status: COMPLETED | OUTPATIENT
Start: 2024-02-04 | End: 2024-02-04

## 2024-02-04 RX ORDER — AMLODIPINE BESYLATE 2.5 MG/1
10 TABLET ORAL ONCE
Refills: 0 | Status: COMPLETED | OUTPATIENT
Start: 2024-02-04 | End: 2024-02-04

## 2024-02-04 RX ORDER — NITROGLYCERIN 6.5 MG
0.4 CAPSULE, EXTENDED RELEASE ORAL ONCE
Refills: 0 | Status: DISCONTINUED | OUTPATIENT
Start: 2024-02-04 | End: 2024-02-06

## 2024-02-04 RX ORDER — HYDRALAZINE HCL 50 MG
5 TABLET ORAL ONCE
Refills: 0 | Status: COMPLETED | OUTPATIENT
Start: 2024-02-04 | End: 2024-02-04

## 2024-02-04 RX ORDER — HYDRALAZINE HCL 50 MG
25 TABLET ORAL ONCE
Refills: 0 | Status: COMPLETED | OUTPATIENT
Start: 2024-02-04 | End: 2024-02-04

## 2024-02-04 RX ORDER — FUROSEMIDE 40 MG
40 TABLET ORAL ONCE
Refills: 0 | Status: COMPLETED | OUTPATIENT
Start: 2024-02-04 | End: 2024-02-04

## 2024-02-04 RX ORDER — INFLUENZA VIRUS VACCINE 15; 15; 15; 15 UG/.5ML; UG/.5ML; UG/.5ML; UG/.5ML
0.7 SUSPENSION INTRAMUSCULAR ONCE
Refills: 0 | Status: DISCONTINUED | OUTPATIENT
Start: 2024-02-04 | End: 2024-02-06

## 2024-02-04 RX ORDER — OMEPRAZOLE 10 MG/1
1 CAPSULE, DELAYED RELEASE ORAL
Refills: 0 | DISCHARGE

## 2024-02-04 RX ORDER — HYDRALAZINE HCL 50 MG
10 TABLET ORAL ONCE
Refills: 0 | Status: COMPLETED | OUTPATIENT
Start: 2024-02-04 | End: 2024-02-04

## 2024-02-04 RX ORDER — AMLODIPINE BESYLATE 2.5 MG/1
10 TABLET ORAL EVERY 24 HOURS
Refills: 0 | Status: DISCONTINUED | OUTPATIENT
Start: 2024-02-04 | End: 2024-02-06

## 2024-02-04 RX ORDER — HYDRALAZINE HCL 50 MG
50 TABLET ORAL ONCE
Refills: 0 | Status: COMPLETED | OUTPATIENT
Start: 2024-02-04 | End: 2024-02-04

## 2024-02-04 RX ORDER — SODIUM BICARBONATE 1 MEQ/ML
325 SYRINGE (ML) INTRAVENOUS DAILY
Refills: 0 | Status: DISCONTINUED | OUTPATIENT
Start: 2024-02-04 | End: 2024-02-06

## 2024-02-04 RX ORDER — PANTOPRAZOLE SODIUM 20 MG/1
40 TABLET, DELAYED RELEASE ORAL
Refills: 0 | Status: DISCONTINUED | OUTPATIENT
Start: 2024-02-04 | End: 2024-02-06

## 2024-02-04 RX ORDER — TAMSULOSIN HYDROCHLORIDE 0.4 MG/1
1 CAPSULE ORAL
Refills: 0 | DISCHARGE

## 2024-02-04 RX ORDER — NITROGLYCERIN 6.5 MG
1 CAPSULE, EXTENDED RELEASE ORAL DAILY
Refills: 0 | Status: DISCONTINUED | OUTPATIENT
Start: 2024-02-04 | End: 2024-02-06

## 2024-02-04 RX ADMIN — Medication 40 MILLIGRAM(S): at 08:20

## 2024-02-04 RX ADMIN — Medication 1 PATCH: at 18:43

## 2024-02-04 RX ADMIN — Medication 1 PATCH: at 19:05

## 2024-02-04 RX ADMIN — Medication 10 MILLIGRAM(S): at 09:04

## 2024-02-04 RX ADMIN — Medication 325 MILLIGRAM(S): at 15:36

## 2024-02-04 RX ADMIN — TAMSULOSIN HYDROCHLORIDE 0.4 MILLIGRAM(S): 0.4 CAPSULE ORAL at 22:12

## 2024-02-04 RX ADMIN — Medication 5 MILLIGRAM(S): at 22:37

## 2024-02-04 RX ADMIN — Medication 25 MILLIGRAM(S): at 23:16

## 2024-02-04 RX ADMIN — Medication 50 MILLIGRAM(S): at 09:04

## 2024-02-04 RX ADMIN — Medication 0.4 MILLIGRAM(S): at 08:27

## 2024-02-04 RX ADMIN — AMLODIPINE BESYLATE 10 MILLIGRAM(S): 2.5 TABLET ORAL at 08:39

## 2024-02-04 RX ADMIN — Medication 5 MILLIGRAM(S): at 23:16

## 2024-02-04 RX ADMIN — Medication 1 PATCH: at 08:27

## 2024-02-04 NOTE — ED ADULT TRIAGE NOTE - CHIEF COMPLAINT QUOTE
Pt. walk in c/o high BP and headache since yesterday. Also c/o SOB since a cold one month ago. PMH Afib and HTN. Denies chest pain.

## 2024-02-04 NOTE — PATIENT PROFILE ADULT - DEAF OR HARD OF HEARING?
Plan: - Follow up as needed
Initiate Treatment: - tretinoin 0.025% topical cream, apply a pea size 2-3 nights a week at night mixed with a moisturizer, once tolerated can bump up to nightly only as tolerated
Continue Regimen: - Aczone QAM\\n- Plexion Cleanser QD\\n- Spironolactone 50 mg QHS, before she starts her period she can bump up to 100 mg
Detail Level: Zone
no

## 2024-02-04 NOTE — ED PROVIDER NOTE - PHYSICAL EXAMINATION
CONSTITUTIONAL: NAD  SKIN: Warm dry  HEAD: NCAT  EYES: NL inspection  ENT: dry oral mucosa   NECK: Supple; no visible JVD appreciated  CARD: RRR  RESP: crackles in bases   ABD: S/NT no R/G  EXT: no LE edema  NEURO: Grossly unremarkable  PSYCH: Cooperative, appropriate.

## 2024-02-04 NOTE — H&P ADULT - ASSESSMENT
72yo M with PMH of CHF (unknown EF), HTN, hx of GIB initially presented to [facility] w/ headache x 2 days and SOB x ~6-8wks. Pt now admitted to cardiac tele for CHF exacerbation and hypertensive urgency.     In ED   /125, , RR 16, T 97.9  H/H 10.8/33.4, BNP 35K, Trop 154.8, k 5.1  CXR Small b/l effusions  CT negative    Pt was given sublin nitro 0.4 x 2, nitro patch, lasix 40mg IV x 1, norvasc 10, hydral 10 IV x 2 and hydral 50mg PO x 1   72yo M with PMH of CHF (unknown EF), HTN, hx of GIB initially presented to [facility] w/ headache x 2 days and SOB x ~6-8wks. Pt now admitted to cardiac tele for CHF exacerbation and hypertensive urgency.    1st Trimester Sonogram/20 Week Level II Sonogram/Ultra Screen at 12 Weeks

## 2024-02-04 NOTE — H&P ADULT - NSICDXPASTMEDICALHX_GEN_ALL_CORE_FT
PAST MEDICAL HISTORY:  H/O CHF     HTN (hypertension)     White coat syndrome with hypertension

## 2024-02-04 NOTE — ED ADULT TRIAGE NOTE - PAIN RATING/NUMBER SCALE (0-10): ACTIVITY
Pt noted at shift change by day shift nurse and night shift nurse to have had discharge orders placed.  Both nurses went into room at shift change to advise patient and wife on discharge situation.  Wife at bedside indicated to both nurses that no doctor had been in to see patient and explain to patient and family test results, diagnosis, and education on prevention and wife expressed concern that she would like a doctor to come in, talk to family, and address their questions and concerns prior to patient being discharged.  Call placed out to Dr. Gray, night shift doctor, advised of situation, and OK to D/C discharge at this time.  Will advise patient and family.     3 (mild pain)

## 2024-02-04 NOTE — ED ADULT NURSE NOTE - OBJECTIVE STATEMENT
c/o high BP and headache since yesterday. Also c/o SOB since a cold one month ago. PMH Afib and HTN. Denies chest pain.

## 2024-02-04 NOTE — ED PROVIDER NOTE - CLINICAL SUMMARY MEDICAL DECISION MAKING FREE TEXT BOX
[place] PGY3: 72yo M with PMH of ?Afib, HTN, hx of GIB presents to ED for eval of  TAVAREZ for 4-6 wks assoc with elevated BP, off medications. Plan for labs, CXR, trop/BNP, cardiac monitoring. BP control as appropriate. Received 500cc pre-evaluation that was stopped on arrival to room. Currently comfortably but gets mildly tachypneic on prolonged conversation. Possible congestive HF vs HTN urgency. Consider admit for cards eval and medical optimization

## 2024-02-04 NOTE — H&P ADULT - PROBLEM SELECTOR PLAN 3
h/o CKD stage _____, baseline Cr _____  -Cr on admission _____  -IVF: _______  -Home med: ______  -c/w home med  -f/u serum phosph, ulytes  - Avoid nephrotoxic meds, renally dose meds when possible  -(Renal consult) h/o CKD, baseline Cr   -Cr on admission _____  -IVF: _______  -Home med: ______  -c/w home med  -f/u serum phosph, ulytes  - Avoid nephrotoxic meds, renally dose meds when possible  -(Renal consult) h/o CKD, baseline Cr 3.2-3.8   -Cr on admission 4.20  -f/u serum phosph, ulytes  - Avoid nephrotoxic meds, renally dose meds when possible  - Consider renal consult if cr get worse     F: None  E: Replete if K<4 or Mag<2  N: DASH Diet  Dispo: Pending hospital course

## 2024-02-04 NOTE — H&P ADULT - NSHPLABSRESULTS_GEN_ALL_CORE
10.8   9.50  )-----------( 260      ( 04 Feb 2024 06:24 )             33.4       02-04    133  |  104  |  51<H>  ----------------------------<  124<H>  5.1   |  13<L>  |  4.20<H>    Ca    8.8      04 Feb 2024 06:24  Mg     1.8     02-04    TPro  6.7  /  Alb  3.2<L>  /  TBili  0.5  /  DBili  x   /  AST  30  /  ALT  20  /  AlkPhos  135<H>  02-04                Urinalysis Basic - ( 04 Feb 2024 06:24 )    Color: x / Appearance: x / SG: x / pH: x  Gluc: 124 mg/dL / Ketone: x  / Bili: x / Urobili: x   Blood: x / Protein: x / Nitrite: x   Leuk Esterase: x / RBC: x / WBC x   Sq Epi: x / Non Sq Epi: x / Bacteria: x        EKG: 10.8   9.50  )-----------( 260      ( 04 Feb 2024 06:24 )             33.4       02-04    133  |  104  |  51<H>  ----------------------------<  124<H>  5.1   |  13<L>  |  4.20<H>    Ca    8.8      04 Feb 2024 06:24  Mg     1.8     02-04    TPro  6.7  /  Alb  3.2<L>  /  TBili  0.5  /  DBili  x   /  AST  30  /  ALT  20  /  AlkPhos  135<H>  02-04                Urinalysis Basic - ( 04 Feb 2024 06:24 )    Color: x / Appearance: x / SG: x / pH: x  Gluc: 124 mg/dL / Ketone: x  / Bili: x / Urobili: x   Blood: x / Protein: x / Nitrite: x   Leuk Esterase: x / RBC: x / WBC x   Sq Epi: x / Non Sq Epi: x / Bacteria: x        EKG: Sinus tach to 104 bpm.

## 2024-02-04 NOTE — ED PROVIDER NOTE - ATTENDING CONTRIBUTION TO CARE
74 yo M with PMH of HTN, afib?, GI bleed presents to the ED for worsening SOB over the past month. Pt came to the ED tonight because yesterday he ate Chinese food and developed a headache. No nausea, vomiting, CP. Pt has not noticed and LE swelling.     Const: No apparent distress  Eyes: PERRL, no conjunctival injection  HENT:  Neck supple without meningismus   CV: RRR, Warm, well-perfused extremities  RESP: crackles, no tachypnea   GI: soft, non-tender, non-distended  MSK: No gross deformities appreciated  Skin: Warm, dry. No rashes  Neuro: Alert, CNs II-XII grossly intact. Sensation and motor function of extremities grossly intact.  Psych: Appropriate mood and affect.    concern for CHF will do labs, cxr, screening CT head due to HA

## 2024-02-04 NOTE — ED PROVIDER NOTE - OBJECTIVE STATEMENT
74yo M with PMH of ?Afib, HTN, hx of GIB presents to ED for eval of  TAVAREZ. For last ~4-6wks has been having TAVAREZ. Hasn't seen a doctor >1 yr, off his norvasc and water pill as long. No CP but feels TAVAREZ has become more noticable. Yesterday 'after eating Chinese food' had a mild aching posterior HA that resolved. Then this AM woke up and had same HA and felt dyspnic so daughter brought him to ED. Denied CP, palpitations, abd pain, NV, visual blurring. Currently HA resolved.

## 2024-02-04 NOTE — H&P ADULT - PROBLEM SELECTOR PLAN 2
on admission 234/125, now 158/72  - C/w P/w SOB/TAVAREZ, satting 98% on RA +JVD, BNP 35k, Trop 154.8  - EKG: Sinus tach to 104 bpm.  - CXR Small b/l effusions  CT negative   -Echo ordered   -Diuresis: s/p lasix 40mg IV in AM. Further diuretics held for now in setting of high Cr (4.20)  -strict I/Os, core measures, daily weights

## 2024-02-04 NOTE — ED PROVIDER NOTE - PROGRESS NOTE DETAILS
Patient was signed out to me, Dr. Biswas by overnight team at approximately 8 AM.  Patient was just admitted to cardiology service for CHF exacerbation.  Patient is pending head CT which has returned and shows no acute intracranial hemorrhage or other acute issues.  Patient's chest x-ray shows bilateral pleural effusions and venous congestion along with cardiomegaly.  Patient's troponin is slightly elevated and BNP is more than 35,000.  Patient was accepted by the cardiology attending.  Patient noted to be extremely hypertensive.  Patient reports he is feeling better now that he is in the emergency department but he is still pending treatment for his blood pressure.  He was just given Lasix 40 mg IV, and we will give him a nitro sublingual for acute control followed by a nitro patch.  Patient reports to me that he is supposed to be taking Norvasc 10 mg and hydrochlorothiazide 25 mg, but is noncompliant with his medications.  We will restart his Norvasc now.  Patient is pending bed placement and transportation to hospital. EMS here to transport pt.  SBP went from 240 to 220 with previous treatment.  Will give additional hydralazine.  SBP went down to 206.  SBP has [id number] by 15%.  We don't want to drop this patient too fast as he likely lives with an elevated BP at baseline.

## 2024-02-04 NOTE — H&P ADULT - HISTORY OF PRESENT ILLNESS
74yo M with PMH of CHF (unknown EF), HTN, hx of GIB initially presented to [facility] w/ headache x 2 days and TAVAREZ x ~4-6wks. Pt reports that the HA began 2 days ago after eatting chinese food and has been intermittent since then. He reports that the WAGNER episodes were getting worse and that is why he went to the ED.  Hasn't seen a doctor >1 yr, off his norvasc and water pill as long. No CP but feels TAVAREZ has become more noticable. Yesterday 'after eating Chinese food' had a mild aching posterior HA that resolved. Then this AM woke up and had same HA and felt dyspnic so daughter brought him to ED. Denied CP, palpitations, abd pain, NV, visual blurring. Currently HA resolved.      In ED   /125, , RR 16, T 97.9  H/H 10.8/33.4, BNP 35K, Trop 154.8, k 5.1  CXR Small b/l effusions  CT negative 74yo M with PMH of CHF (unknown EF), HTN, hx of GIB initially presented to [facility] w/ headache x 2 days and SOB x ~6-8wks. Pt reports that the HA began 2 days ago after eatting chinese food and has been intermittent since then. He reports that the WAGNER episodes were getting worse and that is why he went to the ED.  Pt also reports SOB which started when he caught a URI form his daughter in dec/nov. His URI sx have resolved but his SOB has remained. He states that the SOB is constant and even occurs while at rest. Pt states that he believes he has h/o CHF but is not sure and does not have any echos on files. Pt hasn't seen a doctor >1 yr and has been off his norvasc and water pill for > 1 year. Last saw Dr. Hager. Currently denies edema, CP, palpitations, abd pain, NV, fever, syncope, light-headedness, dizziness or visual blurring. Currently HA resolved.    In ED   /125, , RR 16, T 97.9  H/H 10.8/33.4, BNP 35K, Trop 154.8, k 5.1  CXR Small b/l effusions  CT negative    Pt was given sublin nitro 0.4 x 2, nitro patch, lasix 40mg IV x 1, norvasc 10, hydral 10 IV x 2 and hydral 50mg PO x 1 74yo M with PMH of CHF (unknown EF), HTN, hx of GIB initially presented to [facility] w/ headache x 2 days and SOB x ~6-8wks. Pt reports that the HA began 2 days ago after eatting chinese food and has been intermittent since then. He reports that the WAGNER episodes were getting worse and that is why he went to the ED.  Pt also reports SOB which started when he caught a URI form his daughter in dec/nov. His URI sx have resolved but his SOB has remained. He states that the SOB is constant and even occurs while at rest. Pt states that he believes he has h/o CHF but is not sure and does not have any echos on files. Pt hasn't seen a doctor >1 yr and has been off his norvasc and water pill for > 1 year. Last saw Dr. Hager. Currently denies edema, CP, palpitations, abd pain, NV, fever, syncope, light-headedness, dizziness or visual blurring. Currently HA resolved.    In ED   /125, , RR 16, T 97.9  H/H 10.8/33.4, BNP 35K, Trop 154.8, k 5.1  EKG sinus tachy 104 bpm  CXR Small b/l effusions  CT negative    Pt was given sublingual nitro 0.4 x 2, nitro patch, lasix 40mg IV x 1, norvasc 10, hydral 10 IV x 2 and hydral 50mg PO x 1

## 2024-02-04 NOTE — H&P ADULT - NS ATTEND AMEND GEN_ALL_CORE FT
74yo M with PMH of CHF (unknown EF), HTN, hx of GIB initially presented to [facility] w/ headache x 2 days and SOB x ~6-8wks. Pt now admitted to cardiac tele for CHF exacerbation and hypertensive urgency.   Still c/o SOB  /125, now 158/72. Lungs clear, trace edema. Trop and pro BNP elevated as above, creat 4.2.     1. HTN urgency  As above  hold HCTZ for now  continue norvasc for BP control    2. CHF, unknown type  3. SOB  Hx as above  CXR pending  echo in AM to assess LVEF   Plan for now to hold diuretics due to rising creat  In view of ongoing TAVAREZ, will r/o PE as well    4. CKD  Follow creat off HCTZ    5. elevated troponin likely demand related  for now conservative approach in view of CKD

## 2024-02-04 NOTE — ED PROVIDER NOTE - CARE PLAN
Principal Discharge DX:	HTN (hypertension)   1 Principal Discharge DX:	HTN (hypertension)  Secondary Diagnosis:	New onset of congestive heart failure

## 2024-02-04 NOTE — H&P ADULT - PROBLEM SELECTOR PLAN 1
P/w SOB/TAVAREZ, +JVD, BNP 35k, Trop    -s/p Lasix 40mg IVP x1 and initially placed on BiPAP with improvement in sx--->weaned to NC 4L satting high 90s.   -EKG:   -Echo ordered   -Diuresis: c/w ___________  -strict I/Os, core measures, daily weights BP on admission 234/125, now 158/72  - C/w norvasc 10mg qd  - Stop home HCTZ 25mg 2/2 worsening Cr  - Pt is s/p sublin nitro 0.4 x 2, nitro patch, lasix 40mg IV x 1, norvasc 10, hydral 10 IV x 2 and hydral 50mg PO x 1  - CT head 2/4 neg

## 2024-02-04 NOTE — PATIENT PROFILE ADULT - FALL HARM RISK - HARM RISK INTERVENTIONS

## 2024-02-05 LAB
A1C WITH ESTIMATED AVERAGE GLUCOSE RESULT: 5.7 % — HIGH (ref 4–5.6)
ANION GAP SERPL CALC-SCNC: 14 MMOL/L — SIGNIFICANT CHANGE UP (ref 5–17)
BUN SERPL-MCNC: 47 MG/DL — HIGH (ref 7–23)
CALCIUM SERPL-MCNC: 8.6 MG/DL — SIGNIFICANT CHANGE UP (ref 8.4–10.5)
CHLORIDE SERPL-SCNC: 109 MMOL/L — HIGH (ref 96–108)
CHOLEST SERPL-MCNC: 196 MG/DL — SIGNIFICANT CHANGE UP
CO2 SERPL-SCNC: 13 MMOL/L — LOW (ref 22–31)
CREAT SERPL-MCNC: 4.26 MG/DL — HIGH (ref 0.5–1.3)
EGFR: 14 ML/MIN/1.73M2 — LOW
ESTIMATED AVERAGE GLUCOSE: 117 MG/DL — HIGH (ref 68–114)
GLUCOSE SERPL-MCNC: 77 MG/DL — SIGNIFICANT CHANGE UP (ref 70–99)
HCT VFR BLD CALC: 31.5 % — LOW (ref 39–50)
HDLC SERPL-MCNC: 61 MG/DL — SIGNIFICANT CHANGE UP
HGB BLD-MCNC: 10.5 G/DL — LOW (ref 13–17)
LIPID PNL WITH DIRECT LDL SERPL: 123 MG/DL — HIGH
MAGNESIUM SERPL-MCNC: 1.7 MG/DL — SIGNIFICANT CHANGE UP (ref 1.6–2.6)
MCHC RBC-ENTMCNC: 27.5 PG — SIGNIFICANT CHANGE UP (ref 27–34)
MCHC RBC-ENTMCNC: 33.3 GM/DL — SIGNIFICANT CHANGE UP (ref 32–36)
MCV RBC AUTO: 82.5 FL — SIGNIFICANT CHANGE UP (ref 80–100)
NON HDL CHOLESTEROL: 135 MG/DL — HIGH
NRBC # BLD: 0 /100 WBCS — SIGNIFICANT CHANGE UP (ref 0–0)
PHOSPHATE SERPL-MCNC: 3.4 MG/DL — SIGNIFICANT CHANGE UP (ref 2.5–4.5)
PLATELET # BLD AUTO: 241 K/UL — SIGNIFICANT CHANGE UP (ref 150–400)
POTASSIUM SERPL-MCNC: 4.6 MMOL/L — SIGNIFICANT CHANGE UP (ref 3.5–5.3)
POTASSIUM SERPL-SCNC: 4.6 MMOL/L — SIGNIFICANT CHANGE UP (ref 3.5–5.3)
RBC # BLD: 3.82 M/UL — LOW (ref 4.2–5.8)
RBC # FLD: 15.2 % — HIGH (ref 10.3–14.5)
SODIUM SERPL-SCNC: 136 MMOL/L — SIGNIFICANT CHANGE UP (ref 135–145)
T3 SERPL-MCNC: 94 NG/DL — SIGNIFICANT CHANGE UP (ref 80–200)
T4 AB SER-ACNC: 7.1 UG/DL — SIGNIFICANT CHANGE UP (ref 4.5–11.7)
TRIGL SERPL-MCNC: 59 MG/DL — SIGNIFICANT CHANGE UP
TSH SERPL-MCNC: 2.61 UIU/ML — SIGNIFICANT CHANGE UP (ref 0.27–4.2)
WBC # BLD: 7.73 K/UL — SIGNIFICANT CHANGE UP (ref 3.8–10.5)
WBC # FLD AUTO: 7.73 K/UL — SIGNIFICANT CHANGE UP (ref 3.8–10.5)

## 2024-02-05 PROCEDURE — 93306 TTE W/DOPPLER COMPLETE: CPT | Mod: 26

## 2024-02-05 PROCEDURE — 99233 SBSQ HOSP IP/OBS HIGH 50: CPT

## 2024-02-05 RX ORDER — HYDRALAZINE HCL 50 MG
50 TABLET ORAL THREE TIMES A DAY
Refills: 0 | Status: DISCONTINUED | OUTPATIENT
Start: 2024-02-05 | End: 2024-02-06

## 2024-02-05 RX ORDER — MAGNESIUM OXIDE 400 MG ORAL TABLET 241.3 MG
800 TABLET ORAL ONCE
Refills: 0 | Status: COMPLETED | OUTPATIENT
Start: 2024-02-05 | End: 2024-02-05

## 2024-02-05 RX ORDER — HYDRALAZINE HCL 50 MG
50 TABLET ORAL THREE TIMES A DAY
Refills: 0 | Status: DISCONTINUED | OUTPATIENT
Start: 2024-02-05 | End: 2024-02-05

## 2024-02-05 RX ADMIN — Medication 50 MILLIGRAM(S): at 21:17

## 2024-02-05 RX ADMIN — Medication 1 PATCH: at 11:17

## 2024-02-05 RX ADMIN — PANTOPRAZOLE SODIUM 40 MILLIGRAM(S): 20 TABLET, DELAYED RELEASE ORAL at 05:18

## 2024-02-05 RX ADMIN — AMLODIPINE BESYLATE 10 MILLIGRAM(S): 2.5 TABLET ORAL at 05:20

## 2024-02-05 RX ADMIN — MAGNESIUM OXIDE 400 MG ORAL TABLET 800 MILLIGRAM(S): 241.3 TABLET ORAL at 11:17

## 2024-02-05 RX ADMIN — Medication 50 MILLIGRAM(S): at 15:31

## 2024-02-05 RX ADMIN — Medication 325 MILLIGRAM(S): at 11:17

## 2024-02-05 RX ADMIN — Medication 1 PATCH: at 22:42

## 2024-02-05 RX ADMIN — Medication 25 MILLIGRAM(S): at 05:18

## 2024-02-05 RX ADMIN — Medication 1 PATCH: at 20:30

## 2024-02-05 RX ADMIN — TAMSULOSIN HYDROCHLORIDE 0.4 MILLIGRAM(S): 0.4 CAPSULE ORAL at 22:09

## 2024-02-05 NOTE — PROGRESS NOTE ADULT - PROBLEM SELECTOR PLAN 1
BP on admission 234/125, now 158/72  - C/w norvasc 10mg qd  - Stop home HCTZ 25mg 2/2 worsening Cr  - Pt is s/p sublin nitro 0.4 x 2, nitro patch, lasix 40mg IV x 1, norvasc 10, hydral 10 IV x 2 and hydral 50mg PO x 1  - CT head 2/4 neg BP on admission 234/125, now -160's  - CT head 2/4 neg  - C/w norvasc 10mg qd, increased hydralazine 50mg TID.   - d/c home HCTZ iso worsening Cr  -renal consulted for advanced CKD, f/u recs

## 2024-02-05 NOTE — PROGRESS NOTE ADULT - NS ATTEND AMEND GEN_ALL_CORE FT
74yo M with PMH of CHF (unknown EF), HTN, hx of GIB initially presented to [facility] w/ headache x 2 days and SOB x ~6-8wks. Pt now admitted to cardiac tele for CHF exacerbation and hypertensive urgency.   Still c/o SOB  /125, now 158/72. Lungs clear, trace edema. Trop and pro BNP elevated as above, creat 4.2.     1. HTN urgency  As above  continue norvasc for BP control  Increase hydralazine to 50 mg tid.  Echo ordered: if low LVEF add coreg 3.125 mg bid, if normal LVEF Labetalol 200 mg bid.    2. CHF, unknown type  Echo pending, appears euvolemic on examination. Pending echo findings may need secondary work up ([facility]) as an outpatient.    3. SOB  Resolved.     4. CKD stage IV.  Baseline Cr 5.12  5/2022. Consult renal.    5. elevated troponin likely demand related  for now conservative approach in view of CKD - chest pain free, likely NSTEMI type 2. 72yo M with PMH of CHF (unknown EF), HTN, hx of GIB initially presented to [facility] w/ headache x 2 days and SOB x ~6-8wks. Pt now admitted to cardiac tele for CHF exacerbation and hypertensive urgency.   Still c/o SOB  /125, now 158/72. Lungs clear, trace edema. Trop and pro BNP elevated as above, creat 4.2.     1. HTN urgency  As above  continue norvasc for BP control  Increase hydralazine to 50 mg tid.  Echo ordered: if low LVEF add coreg 3.125 mg bid, if normal LVEF Labetalol 200 mg bid.    2. CHF, unknown type  Echo pending, appears euvolemic on examination. Pending echo findings may need secondary work up ([facility]) as an outpatient.    3. SOB  Resolved.     4. CKD stage IV.  Baseline Cr 5.12  5/2022. Consult renal.    5. elevated troponin likely demand related  for now conservative approach in view of CKD - chest pain free, likely NSTEMI type 2.    I have personally and independently provided 70 minutes of critical care services.  This excludes any time spent on separate procedures or teaching.

## 2024-02-05 NOTE — PROGRESS NOTE ADULT - SUBJECTIVE AND OBJECTIVE BOX
Cardiology PA Adult Progress Note    SUBJECTIVE ASSESSMENT:  	  MEDICATIONS:  amLODIPine   Tablet 10 milliGRAM(s) Oral every 24 hours  hydrALAZINE 25 milliGRAM(s) Oral three times a day  nitroglycerin     SubLingual 0.4 milliGRAM(s) SubLingual once  nitroglycerin    Patch 0.1 mG/Hr(s) 1 patch Transdermal daily          pantoprazole    Tablet 40 milliGRAM(s) Oral before breakfast      influenza  Vaccine (HIGH DOSE) 0.7 milliLiter(s) IntraMuscular once  sodium bicarbonate 325 milliGRAM(s) Oral daily  tamsulosin 0.4 milliGRAM(s) Oral at bedtime    	  VITAL SIGNS:  T(C): 36.4 (02-05-24 @ 05:15), Max: 36.6 (02-04-24 @ 09:33)  HR: 90 (02-05-24 @ 05:15) (80 - 100)  BP: 183/85 (02-05-24 @ 05:15) (140/64 - 204/100)  RR: 18 (02-05-24 @ 05:15) (15 - 22)  SpO2: 98% (02-05-24 @ 05:15) (96% - 100%)  Wt(kg): --    I&O's Summary    04 Feb 2024 07:01  -  05 Feb 2024 07:00  --------------------------------------------------------  IN: 380 mL / OUT: 1450 mL / NET: -1070 mL      Height (cm): 175.3 (02-04 @ 10:12)  Weight (kg): 66 (02-04 @ 10:12)  BMI (kg/m2): 21.5 (02-04 @ 10:12)  BSA (m2): 1.8 (02-04 @ 10:12)                                     PHYSICAL EXAM:  Appearance: Normal	  HEENT: Normal oral mucosa, PERRL, EOMI	  Neck: Supple, + JVD/ - JVD; ___ Carotid Bruit   Cardiovascular: Normal S1 S2, No murmurs  Respiratory: Lungs clear to auscultation/Decreased Breath Sounds/No Rales, Rhonchi, Wheezing	  Gastrointestinal:  Soft, Non-tender, + BS	  Skin: No rashes, No ecchymoses, No cyanosis  Extremities: Normal range of motion, No clubbing, cyanosis or edema  Vascular: Peripheral pulses palpable 2+ bilaterally  Neurologic: Non-focal  Psychiatry: A & O x 3, Mood & affect appropriate    LABS:	 	                                  10.5   7.73  )-----------( 241      ( 05 Feb 2024 05:30 )             31.5     02-05    136  |  109<H>  |  47<H>  ----------------------------<  77  4.6   |  13<L>  |  4.26<H>    Ca    8.6      05 Feb 2024 05:30  Phos  3.4     02-05  Mg     1.7     02-05    TPro  6.7  /  Alb  3.2<L>  /  TBili  0.5  /  DBili  x   /  AST  30  /  ALT  20  /  AlkPhos  135<H>  02-04    proBNP:   Lipid Profile:   HgA1c:   TSH: Thyroid Stimulating Hormone, Serum: 2.610 uIU/mL (02-05 @ 05:30)     Cardiology PA Adult Progress Note    SUBJECTIVE ASSESSMENT:    Denies chest pain. Reports improvement in breathing and cough.   	  MEDICATIONS:  amLODIPine   Tablet 10 milliGRAM(s) Oral every 24 hours  hydrALAZINE 25 milliGRAM(s) Oral three times a day  nitroglycerin     SubLingual 0.4 milliGRAM(s) SubLingual once  nitroglycerin    Patch 0.1 mG/Hr(s) 1 patch Transdermal daily  pantoprazole    Tablet 40 milliGRAM(s) Oral before breakfast  influenza  Vaccine (HIGH DOSE) 0.7 milliLiter(s) IntraMuscular once  sodium bicarbonate 325 milliGRAM(s) Oral daily  tamsulosin 0.4 milliGRAM(s) Oral at bedtime    	  VITAL SIGNS:  T(C): 36.4 (02-05-24 @ 05:15), Max: 36.6 (02-04-24 @ 09:33)  HR: 90 (02-05-24 @ 05:15) (80 - 100)  BP: 183/85 (02-05-24 @ 05:15) (140/64 - 204/100)  RR: 18 (02-05-24 @ 05:15) (15 - 22)  SpO2: 98% (02-05-24 @ 05:15) (96% - 100%)  Wt(kg): --    I&O's Summary    04 Feb 2024 07:01  -  05 Feb 2024 07:00  --------------------------------------------------------  IN: 380 mL / OUT: 1450 mL / NET: -1070 mL      Height (cm): 175.3 (02-04 @ 10:12)  Weight (kg): 66 (02-04 @ 10:12)  BMI (kg/m2): 21.5 (02-04 @ 10:12)  BSA (m2): 1.8 (02-04 @ 10:12)                                     PHYSICAL EXAM:  Appearance: Normal	  HEENT: EOMI	  Neck: Supple  Cardiovascular: Normal S1 S2, No murmurs  Respiratory: Lungs clear to auscultation  Gastrointestinal:  Soft, Non-tender  Skin: No rashes, No ecchymoses, No cyanosis  Extremities: no LE edema B/L  Neurologic: Non-focal  Psychiatry: A & O x 3, Mood & affect appropriate    LABS:	 	                                  10.5   7.73  )-----------( 241      ( 05 Feb 2024 05:30 )             31.5     02-05    136  |  109<H>  |  47<H>  ----------------------------<  77  4.6   |  13<L>  |  4.26<H>    Ca    8.6      05 Feb 2024 05:30  Phos  3.4     02-05  Mg     1.7     02-05    TPro  6.7  /  Alb  3.2<L>  /  TBili  0.5  /  DBili  x   /  AST  30  /  ALT  20  /  AlkPhos  135<H>  02-04      TSH: Thyroid Stimulating Hormone, Serum: 2.610 uIU/mL (02-05 @ 05:30)

## 2024-02-05 NOTE — PROGRESS NOTE ADULT - PROBLEM SELECTOR PLAN 2
P/w SOB/TAVAREZ, satting 98% on RA +JVD, BNP 35k, Trop 154.8  - EKG: Sinus tach to 104 bpm.  - CXR Small b/l effusions  CT negative   -Echo ordered   -Diuresis: s/p lasix 40mg IV in AM. Further diuretics held for now in setting of high Cr (4.20)  -strict I/Os, core measures, daily weights P/w SOB/TAVAREZ, satting 98% on RA, BNP 35k, Trop 154.8  - EKG: Sinus tach to 104 bpm.  - CXR 2/4: Small b/l effusions  - Echo 2/5: EF 47%, moderate LVH, grade II DD, borderline reduced RVSF, biatrial enlargement, mild-mod MR, PASP 49mmHg, small pericardial effusion without echo evidence of cardiac tamponade physiology. Left pleural effusion.   - Diuresis: s/p IV Lasix  - d/c home HCTZ iso worsening Cr  - Renal consulted iso Advanced CKD, f/u recs  -strict I/Os, core measures, daily weights.

## 2024-02-05 NOTE — CONSULT NOTE ADULT - ASSESSMENT
72 yo M w/ PMH of HF, HTN, hx of GIB presenting w/ headache and SOB. Admitted for HF exacerbation and HTN urgency with /125. S/p IV lasix with improvement in SOB. BP remains elevated with peak  overnight. Creatinine noted to be 4.2 on arrival, now 4.26. UA significant for 2.4g proteinuria and WBCs. Chart review reveals Cr on prior admission 2 years ago at lowest 2.0 (one value, possibly not accurate) but ranged from 3.8 - 5.2. Patient denies history of renal disease, has never seen a Nephrologist. No reported family history of renal disease. Patient denies urinary complaint. Denies significant OTC NSAID use. Nephrology consulted for further management of CKD.    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 No reported history of diabetes, A1C 5.7%. UA with subnephrotic proteinuria and pyuria.  - Order renal US  - Recommend GN workup given proteinuria and pyuria - C3, C4, MICKY, anti-dsDNA, ANCA, mp3, pro, anti-GBM, HIV, HBV, HCV, SPEP, UPEP, serum free light chains, serum immunofixation  - Trend creatinine  - Follow up echo  - Can trial an additional conservative dose of IV diuretic if patient remains symptomatic, faint crackles noted on exam and patient hypertensive but no significant peripheral edema  - Monitor UOP, ensure no retention, bladder scan as needed  - Maintain MAP >65 as able  - Avoid nephrotoxic agents  - Will need outpatient Nephrology workup - patient requests follow up in [place]/[address] [place] [address].

## 2024-02-05 NOTE — PROGRESS NOTE ADULT - PROBLEM SELECTOR PLAN 3
h/o CKD, baseline Cr 3.2-3.8   -Cr on admission 4.20  -f/u serum phosph, ulytes  - Avoid nephrotoxic meds, renally dose meds when possible  - Consider renal consult if cr get worse     F: None  E: Replete if K<4 or Mag<2  N: DASH Diet  Dispo: Pending hospital course h/o CKD, baseline Cr 3-4 since 2022  -Cr on admission 4.20  -c/w sodium bicarb 325mg qd  -Renal consulted, workup to r/o glomerulonephritis iso proteinuria and white cells in absence of significant diabetes.   -f/u Renal u/s, bladder scan to r/o any retention, and other labs as noted in their note.     F: None  E: Replete if K<4 or Mag<2  N: DASH Diet  Dispo: Pending hospital course

## 2024-02-05 NOTE — CONSULT NOTE ADULT - SUBJECTIVE AND OBJECTIVE BOX
NEPHROLOGY SERVICE INITIAL CONSULT NOTE    Wilmer Dudley is a 74 yo M w/ PMH of HF, HTN, hx of GIB presenting w/ headache and SOB. Admitted for HF exacerbation and HTN urgency with /125. S/p IV lasix with improvement in SOB. BP remains elevated with peak  overnight. Creatinine noted to be 4.2 on arrival, now 4.26. UA significant for 2.4g proteinuria and WBCs. Chart review reveals Cr on prior admission 2 years ago at lowest 2.0 (one value, possibly not accurate) but ranged from 3.8 - 5.2. Patient denies history of renal disease, has never seen a Nephrologist. No reported family history of renal disease. Patient denies urinary complaint. Denies significant OTC NSAID use. Nephrology consulted for further management of CKD.    REVIEW OF SYSTEMS:   Otherwise negative except as specified in HPI    PAST MEDICAL AND SURGICAL HISTORY:   PAST MEDICAL & SURGICAL HISTORY:  HTN (hypertension)      White coat syndrome with hypertension      H/O CHF      History of bilateral knee replacement      History of left hip replacement  2014      History of arthroplasty of right shoulder          FAMILY HISTORY:  FAMILY HISTORY:  FH: CAD (coronary artery disease) (Mother)        Otherwise Noncontributory to current admission    SOCIAL HISTORY:  Tobacco use: Denies  EtOH use: Denies  Illicit drug use: Denies    HOME MEDICATIONS:      ACTIVE MEDICATIONS:  MEDICATIONS  (STANDING):  amLODIPine   Tablet 10 milliGRAM(s) Oral every 24 hours  hydrALAZINE 50 milliGRAM(s) Oral three times a day  influenza  Vaccine (HIGH DOSE) 0.7 milliLiter(s) IntraMuscular once  nitroglycerin     SubLingual 0.4 milliGRAM(s) SubLingual once  nitroglycerin    Patch 0.1 mG/Hr(s) 1 patch Transdermal daily  pantoprazole    Tablet 40 milliGRAM(s) Oral before breakfast  sodium bicarbonate 325 milliGRAM(s) Oral daily  tamsulosin 0.4 milliGRAM(s) Oral at bedtime    MEDICATIONS  (PRN):      ALLERGIES:  Allergies    No Known Allergies    Intolerances        VITAL SIGNS:  Vital Signs Last 24 Hrs  T(C): 36.4 (05 Feb 2024 11:15), Max: 36.5 (05 Feb 2024 08:22)  T(F): 97.6 (05 Feb 2024 11:15), Max: 97.7 (05 Feb 2024 08:22)  HR: 96 (05 Feb 2024 11:15) (80 - 105)  BP: 137/64 (05 Feb 2024 11:15) (137/64 - 203/91)  BP(mean): 92 (05 Feb 2024 11:15) (92 - 134)  RR: 18 (05 Feb 2024 11:15) (18 - 20)  SpO2: 98% (05 Feb 2024 11:15) (97% - 100%)    Parameters below as of 05 Feb 2024 11:15  Patient On (Oxygen Delivery Method): room air        02-04-24 @ 07:01  -  02-05-24 @ 07:00  --------------------------------------------------------  IN:    Oral Fluid: 380 mL  Total IN: 380 mL    OUT:    Voided (mL): 1450 mL  Total OUT: 1450 mL    Total NET: -1070 mL      02-05-24 @ 07:01  -  02-05-24 @ 12:49  --------------------------------------------------------  IN:    Oral Fluid: 180 mL  Total IN: 180 mL    OUT:    Voided (mL): 400 mL  Total OUT: 400 mL    Total NET: -220 mL          PHYSICAL EXAM:  Constitutional: NAD, lying in bed  Head: NCAT, EOMI  Respiratory: CTAB, faint basilar crackles  Cardiac: Regular rate  Gastrointestinal: abdomen soft, NT/ND  Extremities: WWP, no cyanosis; no peripheral edema  Neurologic: A&Ox3, interactive    LABS:                        10.5   7.73  )-----------( 241      ( 05 Feb 2024 05:30 )             31.5     02-05    136  |  109<H>  |  47<H>  ----------------------------<  77  4.6   |  13<L>  |  4.26<H>    Ca    8.6      05 Feb 2024 05:30  Phos  3.4     02-05  Mg     1.7     02-05    TPro  6.7  /  Alb  3.2<L>  /  TBili  0.5  /  DBili  x   /  AST  30  /  ALT  20  /  AlkPhos  135<H>  02-04      Urinalysis Basic - ( 05 Feb 2024 05:30 )    Color: x / Appearance: x / SG: x / pH: x  Gluc: 77 mg/dL / Ketone: x  / Bili: x / Urobili: x   Blood: x / Protein: x / Nitrite: x   Leuk Esterase: x / RBC: x / WBC x   Sq Epi: x / Non Sq Epi: x / Bacteria: x          CAPILLARY BLOOD GLUCOSE              RADIOLOGY & ADDITIONAL TESTS: Reviewed.

## 2024-02-05 NOTE — PROGRESS NOTE ADULT - ASSESSMENT
72yo M with PMH of CHF (unknown EF), HTN, hx of GIB initially presented to [facility] w/ headache x 2 days and SOB x ~6-8wks. Pt now admitted to cardiac tele for CHF exacerbation and hypertensive urgency.    72yo M with PMH of CHF (unknown EF), HTN, hx of GIB initially presented to [facility] w/ headache x 2 days and SOB x ~6-8wks. Pt now admitted to cardiac tele for CHF exacerbation and hypertensive urgency. Renal consulted, undergoing workup for glomerulonephritis.

## 2024-02-06 ENCOUNTER — TRANSCRIPTION ENCOUNTER (OUTPATIENT)
Age: 74
End: 2024-02-06

## 2024-02-06 VITALS — HEART RATE: 102 BPM | DIASTOLIC BLOOD PRESSURE: 64 MMHG | SYSTOLIC BLOOD PRESSURE: 133 MMHG | OXYGEN SATURATION: 97 %

## 2024-02-06 LAB
ALBUMIN SERPL ELPH-MCNC: 3.2 G/DL — LOW (ref 3.3–5)
ALP SERPL-CCNC: 117 U/L — SIGNIFICANT CHANGE UP (ref 40–120)
ALT FLD-CCNC: 9 U/L — LOW (ref 10–45)
ANION GAP SERPL CALC-SCNC: 13 MMOL/L — SIGNIFICANT CHANGE UP (ref 5–17)
AST SERPL-CCNC: 10 U/L — SIGNIFICANT CHANGE UP (ref 10–40)
BILIRUB SERPL-MCNC: 0.3 MG/DL — SIGNIFICANT CHANGE UP (ref 0.2–1.2)
BUN SERPL-MCNC: 51 MG/DL — HIGH (ref 7–23)
C3 SERPL-MCNC: 99 MG/DL — SIGNIFICANT CHANGE UP (ref 81–157)
C4 SERPL-MCNC: 27 MG/DL — SIGNIFICANT CHANGE UP (ref 13–39)
CALCIUM SERPL-MCNC: 8.3 MG/DL — LOW (ref 8.4–10.5)
CHLORIDE SERPL-SCNC: 110 MMOL/L — HIGH (ref 96–108)
CK SERPL-CCNC: 244 U/L — HIGH (ref 30–200)
CO2 SERPL-SCNC: 14 MMOL/L — LOW (ref 22–31)
CREAT SERPL-MCNC: 4.55 MG/DL — HIGH (ref 0.5–1.3)
EGFR: 13 ML/MIN/1.73M2 — LOW
GLUCOSE SERPL-MCNC: 83 MG/DL — SIGNIFICANT CHANGE UP (ref 70–99)
HAV IGM SER-ACNC: SIGNIFICANT CHANGE UP
HBV CORE AB SER-ACNC: SIGNIFICANT CHANGE UP
HBV CORE IGM SER-ACNC: SIGNIFICANT CHANGE UP
HBV SURFACE AB SER-ACNC: SIGNIFICANT CHANGE UP
HBV SURFACE AG SER-ACNC: SIGNIFICANT CHANGE UP
HCT VFR BLD CALC: 33.9 % — LOW (ref 39–50)
HCV AB S/CO SERPL IA: 0.03 S/CO — SIGNIFICANT CHANGE UP
HCV AB SERPL-IMP: SIGNIFICANT CHANGE UP
HGB BLD-MCNC: 11.1 G/DL — LOW (ref 13–17)
HIV 1+2 AB+HIV1 P24 AG SERPL QL IA: SIGNIFICANT CHANGE UP
KAPPA LC SER QL IFE: 5.53 MG/DL — HIGH (ref 0.33–1.94)
KAPPA/LAMBDA FREE LIGHT CHAIN RATIO, SERUM: 2.72 RATIO — HIGH (ref 0.26–1.65)
LAMBDA LC SER QL IFE: 2.03 MG/DL — SIGNIFICANT CHANGE UP (ref 0.57–2.63)
MAGNESIUM SERPL-MCNC: 1.7 MG/DL — SIGNIFICANT CHANGE UP (ref 1.6–2.6)
MCHC RBC-ENTMCNC: 27.3 PG — SIGNIFICANT CHANGE UP (ref 27–34)
MCHC RBC-ENTMCNC: 32.7 GM/DL — SIGNIFICANT CHANGE UP (ref 32–36)
MCV RBC AUTO: 83.5 FL — SIGNIFICANT CHANGE UP (ref 80–100)
NRBC # BLD: 0 /100 WBCS — SIGNIFICANT CHANGE UP (ref 0–0)
PHOSPHATE SERPL-MCNC: 3 MG/DL — SIGNIFICANT CHANGE UP (ref 2.5–4.5)
PLATELET # BLD AUTO: 275 K/UL — SIGNIFICANT CHANGE UP (ref 150–400)
POTASSIUM SERPL-MCNC: 4.9 MMOL/L — SIGNIFICANT CHANGE UP (ref 3.5–5.3)
POTASSIUM SERPL-SCNC: 4.9 MMOL/L — SIGNIFICANT CHANGE UP (ref 3.5–5.3)
PROT SERPL-MCNC: 5.6 G/DL — LOW (ref 6–8.3)
RBC # BLD: 4.06 M/UL — LOW (ref 4.2–5.8)
RBC # FLD: 15.3 % — HIGH (ref 10.3–14.5)
SODIUM SERPL-SCNC: 137 MMOL/L — SIGNIFICANT CHANGE UP (ref 135–145)
URATE SERPL-MCNC: 10.4 MG/DL — HIGH (ref 3.4–8.8)
WBC # BLD: 9.65 K/UL — SIGNIFICANT CHANGE UP (ref 3.8–10.5)
WBC # FLD AUTO: 9.65 K/UL — SIGNIFICANT CHANGE UP (ref 3.8–10.5)

## 2024-02-06 PROCEDURE — G0378: CPT

## 2024-02-06 PROCEDURE — 86160 COMPLEMENT ANTIGEN: CPT

## 2024-02-06 PROCEDURE — 84133 ASSAY OF URINE POTASSIUM: CPT

## 2024-02-06 PROCEDURE — C8929: CPT

## 2024-02-06 PROCEDURE — 84480 ASSAY TRIIODOTHYRONINE (T3): CPT

## 2024-02-06 PROCEDURE — 36415 COLL VENOUS BLD VENIPUNCTURE: CPT

## 2024-02-06 PROCEDURE — 83880 ASSAY OF NATRIURETIC PEPTIDE: CPT

## 2024-02-06 PROCEDURE — 84156 ASSAY OF PROTEIN URINE: CPT

## 2024-02-06 PROCEDURE — 86334 IMMUNOFIX E-PHORESIS SERUM: CPT

## 2024-02-06 PROCEDURE — 83516 IMMUNOASSAY NONANTIBODY: CPT

## 2024-02-06 PROCEDURE — 80061 LIPID PANEL: CPT

## 2024-02-06 PROCEDURE — 80053 COMPREHEN METABOLIC PANEL: CPT

## 2024-02-06 PROCEDURE — 83735 ASSAY OF MAGNESIUM: CPT

## 2024-02-06 PROCEDURE — 84550 ASSAY OF BLOOD/URIC ACID: CPT

## 2024-02-06 PROCEDURE — 82570 ASSAY OF URINE CREATININE: CPT

## 2024-02-06 PROCEDURE — 84300 ASSAY OF URINE SODIUM: CPT

## 2024-02-06 PROCEDURE — 84165 PROTEIN E-PHORESIS SERUM: CPT

## 2024-02-06 PROCEDURE — 86706 HEP B SURFACE ANTIBODY: CPT

## 2024-02-06 PROCEDURE — 84155 ASSAY OF PROTEIN SERUM: CPT

## 2024-02-06 PROCEDURE — 85025 COMPLETE CBC W/AUTO DIFF WBC: CPT

## 2024-02-06 PROCEDURE — 83935 ASSAY OF URINE OSMOLALITY: CPT

## 2024-02-06 PROCEDURE — 82803 BLOOD GASES ANY COMBINATION: CPT

## 2024-02-06 PROCEDURE — 87340 HEPATITIS B SURFACE AG IA: CPT

## 2024-02-06 PROCEDURE — 70450 CT HEAD/BRAIN W/O DYE: CPT

## 2024-02-06 PROCEDURE — 84484 ASSAY OF TROPONIN QUANT: CPT

## 2024-02-06 PROCEDURE — 86704 HEP B CORE ANTIBODY TOTAL: CPT

## 2024-02-06 PROCEDURE — 87637 SARSCOV2&INF A&B&RSV AMP PRB: CPT

## 2024-02-06 PROCEDURE — 83521 IG LIGHT CHAINS FREE EACH: CPT

## 2024-02-06 PROCEDURE — 86705 HEP B CORE ANTIBODY IGM: CPT

## 2024-02-06 PROCEDURE — 71046 X-RAY EXAM CHEST 2 VIEWS: CPT

## 2024-02-06 PROCEDURE — 86225 DNA ANTIBODY NATIVE: CPT

## 2024-02-06 PROCEDURE — 86803 HEPATITIS C AB TEST: CPT

## 2024-02-06 PROCEDURE — 85027 COMPLETE CBC AUTOMATED: CPT

## 2024-02-06 PROCEDURE — 97161 PT EVAL LOW COMPLEX 20 MIN: CPT

## 2024-02-06 PROCEDURE — 84436 ASSAY OF TOTAL THYROXINE: CPT

## 2024-02-06 PROCEDURE — 87389 HIV-1 AG W/HIV-1&-2 AB AG IA: CPT

## 2024-02-06 PROCEDURE — 83605 ASSAY OF LACTIC ACID: CPT

## 2024-02-06 PROCEDURE — 82550 ASSAY OF CK (CPK): CPT

## 2024-02-06 PROCEDURE — 81001 URINALYSIS AUTO W/SCOPE: CPT

## 2024-02-06 PROCEDURE — 86036 ANCA SCREEN EACH ANTIBODY: CPT

## 2024-02-06 PROCEDURE — 80048 BASIC METABOLIC PNL TOTAL CA: CPT

## 2024-02-06 PROCEDURE — 83036 HEMOGLOBIN GLYCOSYLATED A1C: CPT

## 2024-02-06 PROCEDURE — 99239 HOSP IP/OBS DSCHRG MGMT >30: CPT

## 2024-02-06 PROCEDURE — 86709 HEPATITIS A IGM ANTIBODY: CPT

## 2024-02-06 PROCEDURE — 84100 ASSAY OF PHOSPHORUS: CPT

## 2024-02-06 PROCEDURE — 99285 EMERGENCY DEPT VISIT HI MDM: CPT

## 2024-02-06 PROCEDURE — 84443 ASSAY THYROID STIM HORMONE: CPT

## 2024-02-06 PROCEDURE — 84540 ASSAY OF URINE/UREA-N: CPT

## 2024-02-06 PROCEDURE — 86038 ANTINUCLEAR ANTIBODIES: CPT

## 2024-02-06 RX ORDER — SODIUM BICARBONATE 1 MEQ/ML
1 SYRINGE (ML) INTRAVENOUS
Qty: 90 | Refills: 2
Start: 2024-02-06 | End: 2024-05-05

## 2024-02-06 RX ORDER — HYDRALAZINE HCL 50 MG
1 TABLET ORAL
Qty: 90 | Refills: 2
Start: 2024-02-06 | End: 2024-05-05

## 2024-02-06 RX ORDER — AMLODIPINE BESYLATE 2.5 MG/1
1 TABLET ORAL
Qty: 30 | Refills: 2
Start: 2024-02-06 | End: 2024-05-05

## 2024-02-06 RX ORDER — ACETAMINOPHEN 500 MG
325 TABLET ORAL ONCE
Refills: 0 | Status: COMPLETED | OUTPATIENT
Start: 2024-02-06 | End: 2024-02-06

## 2024-02-06 RX ADMIN — Medication 325 MILLIGRAM(S): at 01:40

## 2024-02-06 RX ADMIN — Medication 325 MILLIGRAM(S): at 12:32

## 2024-02-06 RX ADMIN — Medication 50 MILLIGRAM(S): at 12:32

## 2024-02-06 RX ADMIN — AMLODIPINE BESYLATE 10 MILLIGRAM(S): 2.5 TABLET ORAL at 05:32

## 2024-02-06 RX ADMIN — Medication 50 MILLIGRAM(S): at 05:33

## 2024-02-06 RX ADMIN — Medication 325 MILLIGRAM(S): at 02:40

## 2024-02-06 RX ADMIN — PANTOPRAZOLE SODIUM 40 MILLIGRAM(S): 20 TABLET, DELAYED RELEASE ORAL at 05:32

## 2024-02-06 NOTE — PHYSICAL THERAPY INITIAL EVALUATION ADULT - PERTINENT HX OF CURRENT PROBLEM, REHAB EVAL
Patient is 74yo M with PMH of CHF (unknown EF), HTN, hx of GIB initially presented to [facility] w/ headache x 2 days and SOB x ~6-8wks. Pt now admitted to cardiac tele for CHF exacerbation and hypertensive urgency. Renal consulted, undergoing workup for glomerulonephritis.

## 2024-02-06 NOTE — PROGRESS NOTE ADULT - SUBJECTIVE AND OBJECTIVE BOX
Nephrology progress note    Seen at bedside. Resting comfortably. No complaints offered.     Allergies:  No Known Allergies    Hospital Medications:   MEDICATIONS  (STANDING):  amLODIPine   Tablet 10 milliGRAM(s) Oral every 24 hours  hydrALAZINE 50 milliGRAM(s) Oral three times a day  influenza  Vaccine (HIGH DOSE) 0.7 milliLiter(s) IntraMuscular once  nitroglycerin     SubLingual 0.4 milliGRAM(s) SubLingual once  nitroglycerin    Patch 0.1 mG/Hr(s) 1 patch Transdermal daily  pantoprazole    Tablet 40 milliGRAM(s) Oral before breakfast  sodium bicarbonate 325 milliGRAM(s) Oral daily  tamsulosin 0.4 milliGRAM(s) Oral at bedtime    REVIEW OF SYSTEMS:   All other review of systems is negative unless indicated above.    VITALS:  T(F): 97.8 (02-06-24 @ 08:56), Max: 97.8 (02-06-24 @ 08:56)  HR: 102 (02-06-24 @ 14:24)  BP: 133/64 (02-06-24 @ 14:24)  RR: 18 (02-06-24 @ 12:21)  SpO2: 97% (02-06-24 @ 14:24)  Wt(kg): --    02-04 @ 07:01  -  02-05 @ 07:00  --------------------------------------------------------  IN: 380 mL / OUT: 1450 mL / NET: -1070 mL    02-05 @ 07:01  -  02-06 @ 07:00  --------------------------------------------------------  IN: 420 mL / OUT: 1000 mL / NET: -580 mL    02-06 @ 07:01  -  02-06 @ 17:41  --------------------------------------------------------  IN: 0 mL / OUT: 350 mL / NET: -350 mL        PHYSICAL EXAM:  Constitutional: NAD, lying in bed  Head: NCAT, EOMI  Respiratory: CTAB, faint basilar crackles  Cardiac: Regular rate  Gastrointestinal: abdomen soft, NT/ND  Extremities: WWP, no cyanosis; no peripheral edema  Neurologic: A&Ox3, interactive    LABS:  02-06    137  |  110<H>  |  51<H>  ----------------------------<  83  4.9   |  14<L>  |  4.55<H>    Ca    8.3<L>      06 Feb 2024 05:30  Phos  3.0     02-06  Mg     1.7     02-06    TPro  5.6<L>  /  Alb  3.2<L>  /  TBili  0.3  /  DBili      /  AST  10  /  ALT  9<L>  /  AlkPhos  117  02-06                          11.1   9.65  )-----------( 275      ( 06 Feb 2024 05:30 )             33.9       Urine Studies:  Creatinine Trend: 4.55<--, 4.26<--, 4.20<--  Urinalysis Basic - ( 06 Feb 2024 05:30 )    Color:  / Appearance:  / SG:  / pH:   Gluc: 83 mg/dL / Ketone:   / Bili:  / Urobili:    Blood:  / Protein:  / Nitrite:    Leuk Esterase:  / RBC:  / WBC    Sq Epi:  / Non Sq Epi:  / Bacteria:       Sodium, Random Urine: 91 mmol/L (02-04 @ 16:18)  Creatinine, Random Urine: 23 mg/dL (02-04 @ 16:18)  Protein/Creatinine Ratio Calculation: 2.4 Ratio (02-04 @ 16:18)  Osmolality, Random Urine: 238 mosm/kg (02-04 @ 16:18)  Potassium, Random Urine: 7 mmol/L (02-04 @ 16:18)    RADIOLOGY & ADDITIONAL STUDIES:  Reviewed

## 2024-02-06 NOTE — PHYSICAL THERAPY INITIAL EVALUATION ADULT - ADDITIONAL COMMENTS
Patient lives with 2 adult daughters in apartment, +elevator +shower tub. PTA, patient was independent with all functional mobility and ADLs, without the use of AD. patient owns cane and RW from prior surgeries.

## 2024-02-06 NOTE — DISCHARGE NOTE PROVIDER - NSDCCPCAREPLAN_GEN_ALL_CORE_FT
PRINCIPAL DISCHARGE DIAGNOSIS  Diagnosis: Systolic CHF, acute on chronic  Assessment and Plan of Treatment: You have a weak heart, also known as Congestive Heart Failure (CHF). Heart failure is a condition in which the heart does not pump or fill with blood well. As a result, the heart lags behind in its job of moving blood throughout the body. This can lead to symptoms such as swelling, trouble breathing, and feeling tired. Your Ejection Fraction (EF) is 47%, a normal EF is 55-60%.  -Avoid drinking more than 1.5L of fluid daily and maintain a low salt diet (max 2grams daily).  -Please weigh yourself daily, for any significant increases in daily weight of 2lbs/day or 5lbs/week with associated swelling in the legs or abdomen and/or shortness of breath, please call your doctor or go to the emergency room.  -Follow up with  __ in 1 week.        SECONDARY DISCHARGE DIAGNOSES  Diagnosis: Stage 5 chronic kidney disease not on chronic dialysis  Assessment and Plan of Treatment: You have a known history of chronic kidney disease and your baseline Creatinine (kidney function) is Cr 4-5. Please follow up with your new nephrologist outpatient for continued monitoring. You underwent bilateral renal ultrasound and will follow up with results outpatient with your nephrologist. You also underwent labs for workup to rule out glomerulonephritis and will follow up on testing results outpatient as well. In meantime, your sodium bicarb dose is increased to 650mg three times a day.       Diagnosis: HTN (hypertension)  Assessment and Plan of Treatment: You were found to have elevated blood pressure this admission and your medications were adjusted for this reason. Please continue/start amlodipine 10mg once a day and hydralazine 50mg three times a day. Please continue your BP meds as listed to keep your blood pressure controlled. For blood pressure that is too high or too low please see your doctor or go to the emergency room as necessary.       PRINCIPAL DISCHARGE DIAGNOSIS  Diagnosis: Systolic CHF, acute on chronic  Assessment and Plan of Treatment: You have a weak heart, also known as Congestive Heart Failure (CHF). Heart failure is a condition in which the heart does not pump or fill with blood well. As a result, the heart lags behind in its job of moving blood throughout the body. This can lead to symptoms such as swelling, trouble breathing, and feeling tired. Your Ejection Fraction (EF) is 47%, a normal EF is 55-60%.  -Avoid drinking more than 1.5L of fluid daily and maintain a low salt diet (max 2grams daily).  -Please weigh yourself daily, for any significant increases in daily weight of 2lbs/day or 5lbs/week with associated swelling in the legs or abdomen and/or shortness of breath, please call your doctor or go to the emergency room.  -Follow up with Dr. Curiel in 1-2 weeks.        SECONDARY DISCHARGE DIAGNOSES  Diagnosis: Stage 5 chronic kidney disease not on chronic dialysis  Assessment and Plan of Treatment: You have a known history of chronic kidney disease and your baseline Creatinine (kidney function) is Cr 4-5. Please follow up with your new nephrologist outpatient for continued monitoring. You were advised to undergo underwent bilateral renal ultrasound and will follow up with results outpatient with your nephrologist. You also underwent labs for workup to rule out glomerulonephritis and will follow up on testing results outpatient as well. In meantime, your sodium bicarb dose is increased to 650mg three times a day. Please follow up with your new nephrologist Dr. Toney in 1-2 weeks.       Diagnosis: HTN (hypertension)  Assessment and Plan of Treatment: You were found to have elevated blood pressure this admission and your medications were adjusted for this reason. Please continue/start amlodipine 10mg once a day and hydralazine 50mg three times a day. Please continue your BP meds as listed to keep your blood pressure controlled. For blood pressure that is too high or too low please see your doctor or go to the emergency room as necessary.

## 2024-02-06 NOTE — DISCHARGE NOTE PROVIDER - CARE PROVIDER_API CALL
Live Toney  Nephrology  [address], 10[address]  [place] [address]  Phone: [phone]  Fax: [phone]  Follow Up Time: 1 week    Mata Curiel  Cardiovascular Disease  [address] [place] [address], [address]  [place] [address]  Phone: [phone]  Fax: [phone]  Established Patient  Follow Up Time: 2 weeks

## 2024-02-06 NOTE — PHYSICAL THERAPY INITIAL EVALUATION ADULT - LEVEL OF INDEPENDENCE: GAIT, REHAB EVAL
to note, patient desatted to 92-93% with amb, noted short/quick breaths. VSS with sitting and VC for slowed breaths with inc inspiration./supervision

## 2024-02-06 NOTE — DISCHARGE NOTE NURSING/CASE MANAGEMENT/SOCIAL WORK - PATIENT PORTAL LINK FT
You can access the FollowMyHealth Patient Portal offered by [facility] by registering at the following website: [email or web address]. By joining [facility]’s FollowMyHealth portal, you will also be able to view your health information using other applications (apps) compatible with our system. Universal Safety Interventions

## 2024-02-06 NOTE — DISCHARGE NOTE PROVIDER - NSDCMRMEDTOKEN_GEN_ALL_CORE_FT
amLODIPine 10 mg oral tablet: 1 tab(s) orally every 24 hours  hydrALAZINE 25 mg oral tablet: 3 tab(s) orally 3 times a day  hydrALAZINE 25 mg oral tablet: 3 tab(s) orally once a day  omeprazole 40 mg oral delayed release capsule: 1 cap(s) orally once a day  sodium bicarbonate 650 mg oral tablet: 0.5 tab(s) orally once a day  tamsulosin 0.4 mg oral capsule: 1 cap(s) orally once a day   amLODIPine 10 mg oral tablet: 1 tab(s) orally every 24 hours  hydrALAZINE 50 mg oral tablet: 1 tab(s) orally 3 times a day  omeprazole 40 mg oral delayed release capsule: 1 cap(s) orally once a day  sodium bicarbonate 650 mg oral tablet: 1 tab(s) orally 3 times a day  tamsulosin 0.4 mg oral capsule: 1 cap(s) orally once a day   amLODIPine 10 mg oral tablet: 1 tab(s) orally every 24 hours  amLODIPine 10 mg oral tablet: 1 tab(s) orally once a day  hydrALAZINE 50 mg oral tablet: 1 tab(s) orally 3 times a day  hydrALAZINE 50 mg oral tablet: 1 tab(s) orally 3 times a day  omeprazole 40 mg oral delayed release capsule: 1 cap(s) orally once a day  sodium bicarbonate 650 mg oral tablet: 1 tab(s) orally 3 times a day  sodium bicarbonate 650 mg oral tablet: 1 tab(s) orally 3 times a day  tamsulosin 0.4 mg oral capsule: 1 cap(s) orally once a day

## 2024-02-06 NOTE — DISCHARGE NOTE PROVIDER - PROVIDER TOKENS
PROVIDER:[TOKEN:[[id number]],FOLLOWUP:[1 week]],PROVIDER:[TOKEN:[[id number]],FOLLOWUP:[2 weeks],ESTABLISHEDPATIENT:[T]]

## 2024-02-06 NOTE — DISCHARGE NOTE PROVIDER - HOSPITAL COURSE
.... 72yo M with PMH of CHF (unknown EF), HTN, hx of GIB initially presented to [facility] w/ headache x 2 days and SOB x ~6-8wks. Pt reports that the HA began 2 days ago after eatting chinese food and has been intermittent since then. He reports that the WAGNER episodes were getting worse and that is why he went to the ED.  Pt also reports SOB which started when he caught a URI form his daughter in dec/nov. His URI sx have resolved but his SOB has remained. He states that the SOB is constant and even occurs while at rest. Pt states that he believes he has h/o CHF but is not sure and does not have any echos on files. Pt hasn't seen a doctor >1 yr and has been off his norvasc and water pill for > 1 year. Last saw Dr. Hager. Currently denies edema, CP, palpitations, abd pain, NV, fever, syncope, light-headedness, dizziness or visual blurring. Currently HA resolved.    Pt was given sublingual nitro 0.4 x 2, nitro patch, lasix 40mg IV x 1, norvasc 10, hydral 10 IV x 2 and hydral 50mg PO x 1   72yo M with PMH of CHF (unknown EF), HTN, CKD, hx of GIB initially presented to [facility] w/ headache x 2 days and SOB x ~6-8wks as well as hypertensive urgency. Pt reports that the HA began 2 days ago after eating chinese food and has been intermittent since then. He reports that the WAGNER episodes were getting worse and that is why he went to the ED.  Pt also reports SOB which started when he caught a URI form his daughter in dec/nov. His URI sx have resolved but his SOB has remained. He states that the SOB is constant and even occurs while at rest. Pt states that he believes he has h/o CHF but is not sure and does not have any echos on files. Pt hasn't seen a doctor >1 yr and has been off his norvasc and water pill for > 1 year. Last saw Dr. Hager. Currently denies edema, CP, palpitations, abd pain, NV, fever, syncope, light-headedness, dizziness or visual blurring. Currently HA resolved. Pt was given sublingual nitro 0.4 x 2, nitro patch, lasix 40mg IV x 1, norvasc 10, hydral 10 IV x 2 and hydral 50mg PO x 1    Patient was diuresed with IV lasix and is euvolemic. He has known advanced CKD, however, has not had nephrology follow up outpatient. Patient was encouraged for this. He was seen by nephrology inpatient and was being worked up to rule out glomerulonephritis due to WBC in urine and proteinuria in absence of significant diabetes. His BP medications were also optimized for im    -CT head 2/4: negative for any acute findings    -Echo 2/5/24: EF 47%, moderate LVH, grade II DD, borderline reduced RVSF, biatrial enlargement, mild-mod MR, PASP 49mmHg, small pericardial effusion without echo evidence of cardiac tamponade physiology. Left pleural effusion.     -Patient also underwent Bilateral renal u/s 2/6/24 and will follow up on final results outpatient with his nephrologist as patient is eager to be discharged.       Pt seen and examined at bedside this AM without any complaints or events overnight, VSS, labs and telemetry reviewed and pt stable for discharge as discussed with Dr. Rivera. Pt has received appropriate discharge instructions, including medication regimen, access site management and follow up with  __ in 1-2 weeks.       72yo M with PMH of CHF (unknown EF), HTN, CKD, hx of GIB initially presented to [facility] w/ headache x 2 days and SOB x ~6-8wks as well as hypertensive urgency. Pt reports that the HA began 2 days ago after eating chinese food and has been intermittent since then. He reports that the WAGNER episodes were getting worse and that is why he went to the ED.  Pt also reports SOB which started when he caught a URI form his daughter in dec/nov. His URI sx have resolved but his SOB has remained. He states that the SOB is constant and even occurs while at rest. Pt states that he believes he has h/o CHF but is not sure and does not have any echos on files. Pt hasn't seen a doctor >1 yr and has been off his norvasc and water pill for > 1 year. Last saw Dr. Hager. Currently denies edema, CP, palpitations, abd pain, NV, fever, syncope, light-headedness, dizziness or visual blurring. Currently HA resolved. Pt was given sublingual nitro 0.4 x 2, nitro patch, lasix 40mg IV x 1, norvasc 10, hydral 10 IV x 2 and hydral 50mg PO x 1    Patient was diuresed with IV lasix and is euvolemic. He has known advanced CKD, however, has not had nephrology follow up outpatient. Patient was encouraged for this. He was seen by nephrology inpatient and was being worked up to rule out glomerulonephritis due to WBC in urine and proteinuria in absence of significant diabetes. His BP medications were also optimized for im    -CT head 2/4: negative for any acute findings    -Echo 2/5/24: EF 47%, moderate LVH, grade II DD, borderline reduced RVSF, biatrial enlargement, mild-mod MR, PASP 49mmHg, small pericardial effusion without echo evidence of cardiac tamponade physiology. Left pleural effusion.     You were advised to undergo bilateral renal ultrasound to further assess your CKD status, however, at this time you prefer to decline this testing inpatient and are willing to consider this outpatient. Please follow up with your new nephrologist in 1-2 weeks for further care as stated below.     Pt seen and examined at bedside this AM without any complaints or events overnight, VSS, labs and telemetry reviewed and pt stable for discharge as discussed with Dr. Rivera. Pt has received appropriate discharge instructions, including medication regimen, access site management and follow up with new nephrologist Dr. Toney in 1-2 weeks as well as your cardiologist Dr. Curiel in 1-2 weeks.

## 2024-02-06 NOTE — PHYSICAL THERAPY INITIAL EVALUATION ADULT - REFERRING PHYSICIAN, REHAB EVAL
UGO Colin cleared patient for PT with elevated troponin levels. UGO Colin cleared patient for PT with elevated troponin levels; not trending

## 2024-02-06 NOTE — DISCHARGE NOTE PROVIDER - ATTENDING DISCHARGE PHYSICAL EXAMINATION:
74yo M with PMH of CHF (unknown EF), HTN, hx of GIB initially presented to [facility] w/ headache x 2 days and SOB x ~6-8wks. Pt now admitted to cardiac tele for CHF exacerbation and hypertensive urgency.     1. HTN urgency  SBP elevated associated with HF symptoms. BP much improved. Suggest close cardiology follow up.    2. CHF, acute, mildly reduced LVEF  Echocardiogram with mildly reduced LVEF and mild pericardial effusion. LVH present. In light of underlying CKD, would suggest cMRI as an outpatient to rule out amyloidosis and ischemic evaluation if no LVEF improvement on GDMT. Patient did not want to stay for GDMT. Off ARNI/MRA/SGTI2 due to CKD.  Recommend coreg and bidil. Stop amlodipine given low LVEF.    3. SOB  Resolved.     4. CKD stage IV-V  Consulted renal, probably hypertensive nephropathy, renal recommended secondary work up but patient declined inpatient evaluation and requested to be discharged to follow up as an outpatient.    5. elevated troponin likely demand related  for now conservative approach in view of CKD - chest pain free, likely NSTEMI type 2. Would consider ischemic evaluation if LVEF does not improve with blood pressure control, but would need to coordinate with nephrologist.

## 2024-02-06 NOTE — DISCHARGE NOTE PROVIDER - CARE PROVIDERS DIRECT ADDRESSES
[email or web address]

## 2024-02-06 NOTE — PROGRESS NOTE ADULT - ASSESSMENT
72 yo M w/ PMH of HF, HTN, hx of GIB presenting w/ headache and SOB. Admitted for HF exacerbation and HTN urgency with /125. S/p IV lasix with improvement in SOB. BP remains elevated with peak  overnight. Creatinine noted to be 4.2 on arrival, now 4.26. UA significant for 2.4g proteinuria and WBCs. Chart review reveals Cr on prior admission 2 years ago at lowest 2.0 (one value, possibly not accurate) but ranged from 3.8 - 5.2. Patient denies history of renal disease, has never seen a Nephrologist. No reported family history of renal disease. Patient denies urinary complaint. Denies significant OTC NSAID use. Nephrology consulted for further management of CKD.    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 No reported history of diabetes, A1C 5.7%. UA with subnephrotic proteinuria and pyuria.  - Renal US pending  - Recommend GN workup given proteinuria and pyuria - C3, C4, MICKY, anti-dsDNA, ANCA, mp3, pro, anti-GBM, HIV, HBV, HCV, SPEP, UPEP, serum free light chains, serum immunofixation  - Trend creatinine  - Monitor UOP, ensure no retention, bladder scan as needed  - Maintain MAP >65 as able  - Avoid nephrotoxic agents  - Patient adamant on discharge today and not willing to stay for renal ultrasound or for results of workup above. Discussed with patient, will need close outpatient Nephrology follow up for further evaluation and monitoring and repeat labs within 1 week. Patient verbalizes understanding.

## 2024-02-07 LAB
ANA TITR SER: NEGATIVE — SIGNIFICANT CHANGE UP
AUTO DIFF PNL BLD: NEGATIVE — SIGNIFICANT CHANGE UP
C-ANCA SER-ACNC: NEGATIVE — SIGNIFICANT CHANGE UP
GBM IGG SER-ACNC: <0.2 — SIGNIFICANT CHANGE UP (ref 0–0.9)
MYELOPEROXIDASE AB SER-ACNC: <5 UNITS — SIGNIFICANT CHANGE UP
MYELOPEROXIDASE CELLS FLD QL: NEGATIVE — SIGNIFICANT CHANGE UP
P-ANCA SER-ACNC: NEGATIVE — SIGNIFICANT CHANGE UP
PROTEINASE3 AB FLD-ACNC: <5 UNITS — SIGNIFICANT CHANGE UP
PROTEINASE3 AB SER-ACNC: NEGATIVE — SIGNIFICANT CHANGE UP

## 2024-02-08 LAB
% ALBUMIN: 57.6 % — SIGNIFICANT CHANGE UP
% ALPHA 1: 6.3 % — SIGNIFICANT CHANGE UP
% ALPHA 2: 12.4 % — SIGNIFICANT CHANGE UP
% BETA: 12.1 % — SIGNIFICANT CHANGE UP
% GAMMA: 11.6 % — SIGNIFICANT CHANGE UP
% M SPIKE: SIGNIFICANT CHANGE UP
ALBUMIN SERPL ELPH-MCNC: 3.2 G/DL — LOW (ref 3.6–5.5)
ALBUMIN/GLOB SERPL ELPH: 1.3 RATIO — SIGNIFICANT CHANGE UP
ALPHA1 GLOB SERPL ELPH-MCNC: 0.4 G/DL — SIGNIFICANT CHANGE UP (ref 0.1–0.4)
ALPHA2 GLOB SERPL ELPH-MCNC: 0.7 G/DL — SIGNIFICANT CHANGE UP (ref 0.5–1)
B-GLOBULIN SERPL ELPH-MCNC: 0.7 G/DL — SIGNIFICANT CHANGE UP (ref 0.5–1)
DSDNA AB SER-ACNC: <12 IU/ML — SIGNIFICANT CHANGE UP
GAMMA GLOBULIN: 0.6 G/DL — SIGNIFICANT CHANGE UP (ref 0.6–1.6)
INTERPRETATION SERPL IFE-IMP: SIGNIFICANT CHANGE UP
M-SPIKE: SIGNIFICANT CHANGE UP (ref 0–0)
PROT PATTERN SERPL ELPH-IMP: SIGNIFICANT CHANGE UP
PROT SERPL-MCNC: 5.6 G/DL — LOW (ref 6–8.3)

## 2024-02-12 DIAGNOSIS — I13.2 HYPERTENSIVE HEART AND CHRONIC KIDNEY DISEASE WITH HEART FAILURE AND WITH STAGE 5 CHRONIC KIDNEY DISEASE, OR END STAGE RENAL DISEASE: ICD-10-CM

## 2024-02-12 DIAGNOSIS — Z79.899 OTHER LONG TERM (CURRENT) DRUG THERAPY: ICD-10-CM

## 2024-02-12 DIAGNOSIS — I21.A1 MYOCARDIAL INFARCTION TYPE 2: ICD-10-CM

## 2024-02-12 DIAGNOSIS — N18.5 CHRONIC KIDNEY DISEASE, STAGE 5: ICD-10-CM

## 2024-02-12 DIAGNOSIS — Z96.611 PRESENCE OF RIGHT ARTIFICIAL SHOULDER JOINT: ICD-10-CM

## 2024-02-12 DIAGNOSIS — N17.9 ACUTE KIDNEY FAILURE, UNSPECIFIED: ICD-10-CM

## 2024-02-12 DIAGNOSIS — Z11.52 ENCOUNTER FOR SCREENING FOR COVID-19: ICD-10-CM

## 2024-02-12 DIAGNOSIS — Z96.642 PRESENCE OF LEFT ARTIFICIAL HIP JOINT: ICD-10-CM

## 2024-02-12 DIAGNOSIS — Z96.653 PRESENCE OF ARTIFICIAL KNEE JOINT, BILATERAL: ICD-10-CM

## 2024-02-12 DIAGNOSIS — I50.23 ACUTE ON CHRONIC SYSTOLIC (CONGESTIVE) HEART FAILURE: ICD-10-CM

## 2024-02-12 DIAGNOSIS — I16.0 HYPERTENSIVE URGENCY: ICD-10-CM

## 2024-02-20 PROBLEM — Z86.79 PERSONAL HISTORY OF OTHER DISEASES OF THE CIRCULATORY SYSTEM: Chronic | Status: ACTIVE | Noted: 2024-02-04

## 2024-03-13 ENCOUNTER — APPOINTMENT (OUTPATIENT)
Dept: NEPHROLOGY | Facility: CLINIC | Age: 74
End: 2024-03-13
Payer: MEDICARE

## 2024-03-13 VITALS — BODY MASS INDEX: 22.35 KG/M2 | WEIGHT: 147 LBS

## 2024-03-13 VITALS — HEART RATE: 84 BPM | SYSTOLIC BLOOD PRESSURE: 167 MMHG | DIASTOLIC BLOOD PRESSURE: 57 MMHG

## 2024-03-13 DIAGNOSIS — R79.89 OTHER SPECIFIED ABNORMAL FINDINGS OF BLOOD CHEMISTRY: ICD-10-CM

## 2024-03-13 PROCEDURE — 99214 OFFICE O/P EST MOD 30 MIN: CPT

## 2024-03-13 PROCEDURE — 36415 COLL VENOUS BLD VENIPUNCTURE: CPT

## 2024-03-13 PROCEDURE — G2211 COMPLEX E/M VISIT ADD ON: CPT

## 2024-03-13 NOTE — HISTORY OF PRESENT ILLNESS
[FreeTextEntry1] : Following up for hospitalization in February for CHF. He is here with his daughter who was also provided counseling.   Notes from Dr. Kennedy: "74 yo M w/ PMH of HF, HTN, hx of GIB presenting w/ headache and SOB. Admitted for HF exacerbation and HTN urgency with /125. S/p IV lasix with improvement in SOB. BP remains elevated with peak  overnight. Creatinine noted to be 4.2 on arrival, now 4.26. UA significant for 2.4g proteinuria and WBCs. Chart review reveals Cr on prior admission 2 years ago at lowest 2.0 (one value, possibly not accurate) but ranged from 3.8 - 5.2. Patient denies history of renal disease, has never seen a Nephrologist. No reported family history of renal disease. Patient denies urinary complaint. Denies significant OTC NSAID use. Nephrology consulted for further management of CKD."  Creatinine 4.55 on discharge on 06Feb24. 2.4 grams proteinuria. No hematuria. No monoclonal protein. 2.7 ratio. In 2016, creatinine was 1.8. Good energy and appetite.   The patient denies exposure to chronic NSAIDs, chronic PPIs, green smoothies, creatine, or herbal supplements. The patient denies a history of kidney stones or pyelonephritis. No DM. No significant nocturia. No recent renal ultrasound. They are unaware of proteinuria or hematuria.  HTN present for 20 years. * Doesn't check BP at home.  No lightheadedness. No CP/SOB. Compliant with medications.   SH: Social ETOH. FH: No kidney disease  Meds: amLODIPine Tablet 10 milliGRAM(s) Oral every 24 hours hydrALAZINE 50 milliGRAM(s) Oral three times a day influenza Vaccine (HIGH DOSE) 0.7 milliLiter(s) IntraMuscular once nitroglycerin SubLingual 0.4 milliGRAM(s) sodium bicarbonate 325 milliGRAM(5) Oral daily tamsulosin 0.4 milliGRAM(s) Oral at bedtime

## 2024-03-13 NOTE — ASSESSMENT
[FreeTextEntry1] : # CKD stage 5 of unclear cause, possibly aging and hypertensive nephrosclerosis. * Recheck labs, including cystatin C, monoclonal protein evaluation, urinalysis, and urine albumin quantification. * A point of care renal ultrasound using the Butterfly iQ3 was performed and the images were personally reviewed. (The patient understands that this was a brief study to evaluate for hydronephrosis and not a complete renal ultrasound.) The ultrasound showed no significant hydronephrosis, increased echogenicity.  * Check renal ultrasound. * Therapies for kidney disease: blood pressure control; other evidence-based therapies recommended including exercise, a plant-based lower oxalate diet, and 400 mcg folic acid daily * Cardiovascular disease prevention: counseling on healthy diet, physical activity, weight loss, alcohol limitation, blood pressure control * A counseling information sheet on CKD has been given (which they have been instructed to read). * The patient has been counseled that chronic kidney disease is a significant condition and regular office follow-up with me (at least every 1 months for now) is important for monitoring and their health, and that it is their responsibility to make a follow-up appointment. * The patient has been counseled never to stop taking their medications without discussing it with me or another doctor. * The patient has been counseled on avoiding NSAIDs. * The patient has been counseled on risk of worsening kidney function and instructed to immediately call and speak with me and go immediately to ER with any severe symptoms, nausea, vomiting, diarrhea, chest pain, or shortness of breath. * No current indications for dialysis. NIH's "Choosing a Treatment for Kidney Failure" guide has been given. All their questions were answered. They are thinking about and choosing between transplant, peritoneal dialysis, and hemodialysis.  # HTN uncontrolled with fluid overload. * POCUS: 2 LLL b lines. * Start torsemide 10 mg daily. * The patient's blood pressure was checked with the [employer] HEM-907XL using the SPRINT trial protocol after sitting quietly in an empty room with arm supported, back supported, and feet on the floor for 5 minutes. The average of 3 readings were taken. * A counseling information sheet on blood pressure and staying healthy has been given (which they have been instructed to read). * The patient has been counseled to check their BP at home with an automatic arm cuff, write down the readings, and reach me directly on the phone immediately if they are persistently > 180 systolic or if SBP is less than 100 or if lightheadedness develops. They were counseled to bring in all blood pressure readings and medications next visit. * The patient has been counseled that regular office follow-up (at least every 1 month for now)  is important for monitoring and for their health, and that it is their responsibility to make follow up appointments. * The patient also has been counseled that they must never stop or change any medications without discussing this with me (or another physician).

## 2024-03-13 NOTE — PHYSICAL EXAM
[General Appearance - Alert] : alert [General Appearance - In No Acute Distress] : in no acute distress [Neck Appearance] : the appearance of the neck was normal [Neck Cervical Mass (___cm)] : no neck mass was observed [Jugular Venous Distention Increased] : there was no jugular-venous distention [Thyroid Diffuse Enlargement] : the thyroid was not enlarged [Thyroid Nodule] : there were no palpable thyroid nodules [Abdomen Soft] : soft [Abdomen Tenderness] : non-tender [] : no hepato-splenomegaly [Abdomen Mass (___ Cm)] : no abdominal mass palpated [Cervical Lymph Nodes Enlarged Anterior Bilaterally] : anterior cervical [Cervical Lymph Nodes Enlarged Posterior Bilaterally] : posterior cervical [Supraclavicular Lymph Nodes Enlarged Bilaterally] : supraclavicular [FreeTextEntry1] : pitting ankles

## 2024-03-21 LAB
25(OH)D3 SERPL-MCNC: 21.6 NG/ML
ALBUMIN MFR SERPL ELPH: 60.3 %
ALBUMIN SERPL ELPH-MCNC: 4.5 G/DL
ALBUMIN SERPL-MCNC: 4.1 G/DL
ALBUMIN/GLOB SERPL: 1.5 RATIO
ALP BLD-CCNC: 105 U/L
ALPHA1 GLOB MFR SERPL ELPH: 5.3 %
ALPHA1 GLOB SERPL ELPH-MCNC: 0.4 G/DL
ALPHA2 GLOB MFR SERPL ELPH: 11.4 %
ALPHA2 GLOB SERPL ELPH-MCNC: 0.8 G/DL
ALT SERPL-CCNC: 10 U/L
ANION GAP SERPL CALC-SCNC: 16 MMOL/L
APO B SERPL-MCNC: 101 MG/DL
AST SERPL-CCNC: 13 U/L
B-GLOBULIN MFR SERPL ELPH: 11 %
B-GLOBULIN SERPL ELPH-MCNC: 0.7 G/DL
BASOPHILS # BLD AUTO: 0.08 K/UL
BASOPHILS NFR BLD AUTO: 0.8 %
BILIRUB SERPL-MCNC: 0.2 MG/DL
BUN SERPL-MCNC: 44 MG/DL
CALCIUM SERPL-MCNC: 8.9 MG/DL
CALCIUM SERPL-MCNC: 8.9 MG/DL
CHLORIDE SERPL-SCNC: 109 MMOL/L
CHOLEST SERPL-MCNC: 255 MG/DL
CO2 SERPL-SCNC: 14 MMOL/L
CREAT SERPL-MCNC: 4.31 MG/DL
CYSTATIN C SERPL-MCNC: 3.65 MG/L
DEPRECATED KAPPA LC FREE/LAMBDA SER: 3.26 RATIO
EGFR: 14 ML/MIN/1.73M2
EOSINOPHIL # BLD AUTO: 0.21 K/UL
EOSINOPHIL NFR BLD AUTO: 2 %
GAMMA GLOB FLD ELPH-MCNC: 0.8 G/DL
GAMMA GLOB MFR SERPL ELPH: 12 %
GFR/BSA.PRED SERPLBLD CYS-BASED-ARV: 13 ML/MIN/1.73M2
GLUCOSE SERPL-MCNC: 87 MG/DL
HCT VFR BLD CALC: 30.6 %
HDLC SERPL-MCNC: 93 MG/DL
HGB BLD-MCNC: 9.5 G/DL
IGA SER QL IEP: 231 MG/DL
IGG SER QL IEP: 842 MG/DL
IGM SER QL IEP: 29 MG/DL
IMM GRANULOCYTES NFR BLD AUTO: 0.4 %
INTERPRETATION SERPL IEP-IMP: NORMAL
KAPPA LC CSF-MCNC: 2.16 MG/DL
KAPPA LC SERPL-MCNC: 7.05 MG/DL
LDLC SERPL CALC-MCNC: 152 MG/DL
LYMPHOCYTES # BLD AUTO: 0.87 K/UL
LYMPHOCYTES NFR BLD AUTO: 8.2 %
M PROTEIN MFR SERPL ELPH: NORMAL
M PROTEIN SPEC IFE-MCNC: NORMAL
MAN DIFF?: NORMAL
MCHC RBC-ENTMCNC: 26.4 PG
MCHC RBC-ENTMCNC: 31 GM/DL
MCV RBC AUTO: 85 FL
MONOCLON BAND OBS SERPL: NORMAL
MONOCYTES # BLD AUTO: 0.52 K/UL
MONOCYTES NFR BLD AUTO: 4.9 %
NEUTROPHILS # BLD AUTO: 8.83 K/UL
NEUTROPHILS NFR BLD AUTO: 83.7 %
NONHDLC SERPL-MCNC: 162 MG/DL
PARATHYROID HORMONE INTACT: 219 PG/ML
PHOSPHATE SERPL-MCNC: 3.1 MG/DL
PLATELET # BLD AUTO: 282 K/UL
POTASSIUM SERPL-SCNC: 5.3 MMOL/L
PROT SERPL-MCNC: 6.8 G/DL
RBC # BLD: 3.6 M/UL
RBC # FLD: 17.3 %
SODIUM SERPL-SCNC: 139 MMOL/L
TRIGL SERPL-MCNC: 64 MG/DL
TSH SERPL-ACNC: 1.6 UIU/ML
WBC # FLD AUTO: 10.55 K/UL

## 2024-04-05 ENCOUNTER — OUTPATIENT (OUTPATIENT)
Dept: OUTPATIENT SERVICES | Facility: HOSPITAL | Age: 74
LOS: 1 days | End: 2024-04-05

## 2024-04-05 ENCOUNTER — APPOINTMENT (OUTPATIENT)
Dept: ULTRASOUND IMAGING | Facility: CLINIC | Age: 74
End: 2024-04-05
Payer: MEDICARE

## 2024-04-05 DIAGNOSIS — Z96.611 PRESENCE OF RIGHT ARTIFICIAL SHOULDER JOINT: Chronic | ICD-10-CM

## 2024-04-05 DIAGNOSIS — Z96.642 PRESENCE OF LEFT ARTIFICIAL HIP JOINT: Chronic | ICD-10-CM

## 2024-04-05 DIAGNOSIS — Z96.653 PRESENCE OF ARTIFICIAL KNEE JOINT, BILATERAL: Chronic | ICD-10-CM

## 2024-04-05 PROCEDURE — 76770 US EXAM ABDO BACK WALL COMP: CPT | Mod: 26

## 2024-04-15 ENCOUNTER — APPOINTMENT (OUTPATIENT)
Dept: NEPHROLOGY | Facility: CLINIC | Age: 74
End: 2024-04-15
Payer: MEDICARE

## 2024-04-15 VITALS — WEIGHT: 150 LBS | BODY MASS INDEX: 22.81 KG/M2

## 2024-04-15 VITALS — DIASTOLIC BLOOD PRESSURE: 56 MMHG | SYSTOLIC BLOOD PRESSURE: 158 MMHG | HEART RATE: 95 BPM

## 2024-04-15 DIAGNOSIS — R68.89 OTHER GENERAL SYMPTOMS AND SIGNS: ICD-10-CM

## 2024-04-15 DIAGNOSIS — N40.0 BENIGN PROSTATIC HYPERPLASIA WITHOUT LOWER URINARY TRACT SYMPMS: ICD-10-CM

## 2024-04-15 PROCEDURE — 36415 COLL VENOUS BLD VENIPUNCTURE: CPT

## 2024-04-15 PROCEDURE — G2211 COMPLEX E/M VISIT ADD ON: CPT

## 2024-04-15 PROCEDURE — 99214 OFFICE O/P EST MOD 30 MIN: CPT

## 2024-04-15 RX ORDER — TORSEMIDE 20 MG/1
20 TABLET ORAL DAILY
Qty: 90 | Refills: 3 | Status: ACTIVE | COMMUNITY
Start: 2024-03-13 | End: 1900-01-01

## 2024-04-15 NOTE — ASSESSMENT
[FreeTextEntry1] : -- # CKD stage 5 of unclear cause, possibly aging and hypertensive nephrosclerosis. * Recheck labs, including cystatin C. * Therapies for kidney disease: blood pressure control; other evidence-based therapies recommended including exercise, a plant-based lower oxalate diet, and 400 mcg folic acid daily * Cardiovascular disease prevention: counseling on healthy diet, physical activity, weight loss, alcohol limitation, blood pressure control * A counseling information sheet on CKD has been given (which they have been instructed to read). * The patient has been counseled that chronic kidney disease is a significant condition and regular office follow-up with me (at least every 1 months for now) is important for monitoring and their health, and that it is their responsibility to make a follow-up appointment. * The patient has been counseled never to stop taking their medications without discussing it with me or another doctor. * The patient has been counseled on avoiding NSAIDs. * The patient has been counseled on risk of worsening kidney function and instructed to immediately call and speak with me and go immediately to ER with any severe symptoms, nausea, vomiting, diarrhea, chest pain, or shortness of breath. * No current indications for dialysis. NIH's "Choosing a Treatment for Kidney Failure" guide has been given. All their questions were answered. They are thinking about and choosing between transplant, peritoneal dialysis, and hemodialysis.  # HTN uncontrolled with fluid overload. * Increase torsemide to 20.  * The patient's blood pressure was checked with the [employer] [id number]-907XL using the SPRINT trial protocol after sitting quietly in an empty room with arm supported, back supported, and feet on the floor for 5 minutes. The average of 3 readings were taken. * A counseling information sheet on blood pressure and staying healthy has been given (which they have been instructed to read). * The patient has been counseled to check their BP at home with an automatic arm cuff, write down the readings, and reach me directly on the phone immediately if they are persistently > 180 systolic or if SBP is less than 100 or if lightheadedness develops. They were counseled to bring in all blood pressure readings and medications next visit. * The patient has been counseled that regular office follow-up (at least every 1 month for now) is important for monitoring and for their health, and that it is their responsibility to make follow up appointments. * The patient also has been counseled that they must never stop or change any medications without discussing this with me (or another physician).  # Postvoid residual without hydro. * Urology followup advised.

## 2024-04-15 NOTE — HISTORY OF PRESENT ILLNESS
[FreeTextEntry1] : Following up for hospitalization in February for CHF. He is here with his daughter who was also provided counseling.   * Doesn't check BP at home.  No SOB with exertion. No CP. No lightheadedness. Compliant with medications. * Toresmide 10 started last visit. Minimal additional diuresis. * eGFR 14 (avg. of CKD-EPIcr & CKD-EPIcys). 2.4 g proteinuria.  * . No hydro on US.   Previous history (13Mar24): Notes from Dr. Kennedy: "74 yo M w/ PMH of HF, HTN, hx of GIB presenting w/ headache and SOB. Admitted for HF exacerbation and HTN urgency with /125. S/p IV lasix with improvement in SOB. BP remains elevated with peak  overnight. Creatinine noted to be 4.2 on arrival, now 4.26. UA significant for 2.4g proteinuria and WBCs. Chart review reveals Cr on prior admission 2 years ago at lowest 2.0 (one value, possibly not accurate) but ranged from 3.8 - 5.2. Patient denies history of renal disease, has never seen a Nephrologist. No reported family history of renal disease. Patient denies urinary complaint. Denies significant OTC NSAID use. Nephrology consulted for further management of CKD."  Creatinine 4.55 on discharge on 06Feb24. 2.4 grams proteinuria. No hematuria. No monoclonal protein. 2.7 ratio. In 2016, creatinine was 1.8. Good energy and appetite.   The patient denies exposure to chronic NSAIDs, chronic PPIs, green smoothies, creatine, or herbal supplements. The patient denies a history of kidney stones or pyelonephritis. No DM. No significant nocturia. No recent renal ultrasound. They are unaware of proteinuria or hematuria.  HTN present for 20 years. * Doesn't check BP at home.  No lightheadedness. No CP/SOB. Compliant with medications.   SH: Social ETOH. FH: No kidney disease  Meds: amLODIPine Tablet 10 milliGRAM(s) Oral every 24 hours hydrALAZINE 50 milliGRAM(s) Oral three times a day influenza Vaccine (HIGH DOSE) 0.7 milliLiter(s) IntraMuscular once nitroglycerin SubLingual 0.4 milliGRAM(s) sodium bicarbonate 325 milliGRAM(5) Oral daily tamsulosin 0.4 milliGRAM(s) Oral at bedtime

## 2024-04-15 NOTE — PHYSICAL EXAM
[General Appearance - Alert] : alert is good      NOTE: This report was transcribed using voice recognition software.  Every effort was made to ensure accuracy; however, inadvertent computerized transcription errors may be present      Astrid Brewster PA-C  09/26/19 [id number] [General Appearance - In No Acute Distress] : in no acute distress [Neck Appearance] : the appearance of the neck was normal [Neck Cervical Mass (___cm)] : no neck mass was observed [Jugular Venous Distention Increased] : there was no jugular-venous distention [Thyroid Diffuse Enlargement] : the thyroid was not enlarged [Thyroid Nodule] : there were no palpable thyroid nodules [] : no respiratory distress [FreeTextEntry1] : pitting ankles  [Cervical Lymph Nodes Enlarged Posterior Bilaterally] : posterior cervical [Cervical Lymph Nodes Enlarged Anterior Bilaterally] : anterior cervical [Supraclavicular Lymph Nodes Enlarged Bilaterally] : supraclavicular

## 2024-04-17 DIAGNOSIS — N18.4 CHRONIC KIDNEY DISEASE, STAGE 4 (SEVERE): ICD-10-CM

## 2024-04-17 DIAGNOSIS — D63.1 CHRONIC KIDNEY DISEASE, STAGE 4 (SEVERE): ICD-10-CM

## 2024-04-17 LAB
ALBUMIN SERPL ELPH-MCNC: 4.5 G/DL
ALP BLD-CCNC: 102 U/L
ALT SERPL-CCNC: 9 U/L
ANION GAP SERPL CALC-SCNC: 15 MMOL/L
AST SERPL-CCNC: 14 U/L
BASOPHILS # BLD AUTO: 0.06 K/UL
BASOPHILS NFR BLD AUTO: 0.6 %
BILIRUB SERPL-MCNC: 0.2 MG/DL
BUN SERPL-MCNC: 56 MG/DL
CALCIUM SERPL-MCNC: 8.8 MG/DL
CHLORIDE SERPL-SCNC: 108 MMOL/L
CO2 SERPL-SCNC: 17 MMOL/L
CREAT SERPL-MCNC: 4.45 MG/DL
CYSTATIN C SERPL-MCNC: 3.89 MG/L
EGFR: 13 ML/MIN/1.73M2
EOSINOPHIL # BLD AUTO: 0.28 K/UL
EOSINOPHIL NFR BLD AUTO: 2.8 %
GFR/BSA.PRED SERPLBLD CYS-BASED-ARV: 12 ML/MIN/1.73M2
GLUCOSE SERPL-MCNC: 91 MG/DL
HCT VFR BLD CALC: 28.6 %
HGB BLD-MCNC: 8.9 G/DL
IMM GRANULOCYTES NFR BLD AUTO: 0.3 %
LYMPHOCYTES # BLD AUTO: 0.86 K/UL
LYMPHOCYTES NFR BLD AUTO: 8.6 %
MAGNESIUM SERPL-MCNC: 1.9 MG/DL
MAN DIFF?: NORMAL
MCHC RBC-ENTMCNC: 26.6 PG
MCHC RBC-ENTMCNC: 31.1 GM/DL
MCV RBC AUTO: 85.4 FL
MONOCYTES # BLD AUTO: 0.58 K/UL
MONOCYTES NFR BLD AUTO: 5.8 %
NEUTROPHILS # BLD AUTO: 8.21 K/UL
NEUTROPHILS NFR BLD AUTO: 81.9 %
PHOSPHATE SERPL-MCNC: 3.1 MG/DL
PLATELET # BLD AUTO: 280 K/UL
POTASSIUM SERPL-SCNC: 5.2 MMOL/L
PROT SERPL-MCNC: 7.1 G/DL
RBC # BLD: 3.35 M/UL
RBC # FLD: 18.1 %
SODIUM SERPL-SCNC: 139 MMOL/L
WBC # FLD AUTO: 10.02 K/UL

## 2024-05-16 ENCOUNTER — APPOINTMENT (OUTPATIENT)
Dept: NEPHROLOGY | Facility: CLINIC | Age: 74
End: 2024-05-16
Payer: MEDICARE

## 2024-05-16 VITALS — HEART RATE: 86 BPM | SYSTOLIC BLOOD PRESSURE: 152 MMHG | DIASTOLIC BLOOD PRESSURE: 53 MMHG

## 2024-05-16 VITALS — BODY MASS INDEX: 25.09 KG/M2 | WEIGHT: 165 LBS

## 2024-05-16 PROCEDURE — 36415 COLL VENOUS BLD VENIPUNCTURE: CPT

## 2024-05-16 PROCEDURE — G2211 COMPLEX E/M VISIT ADD ON: CPT

## 2024-05-16 PROCEDURE — 99214 OFFICE O/P EST MOD 30 MIN: CPT

## 2024-05-16 RX ORDER — SULFAMETHOXAZOLE AND TRIMETHOPRIM 800; 160 MG/1; MG/1
800-160 TABLET ORAL TWICE DAILY
Qty: 14 | Refills: 0 | Status: DISCONTINUED | COMMUNITY
Start: 2022-07-18 | End: 2024-05-16

## 2024-05-16 RX ORDER — HYDRALAZINE HYDROCHLORIDE 25 MG/1
25 TABLET ORAL
Qty: 270 | Refills: 3 | Status: ACTIVE | COMMUNITY
Start: 2019-03-13 | End: 1900-01-01

## 2024-05-16 NOTE — ASSESSMENT
[FreeTextEntry1] : -- # CKD stage 5 of unclear cause, possibly aging and hypertensive nephrosclerosis. * Recheck labs, * US showed smaller kidneys.  * Therapies for kidney disease: blood pressure control; other evidence-based therapies recommended including exercise, a plant-based lower oxalate diet, and 400 mcg folic acid daily * Cardiovascular disease prevention: counseling on healthy diet, physical activity, weight loss, alcohol limitation, blood pressure control * A counseling information sheet on CKD has been given (which they have been instructed to read). * The patient has been counseled that chronic kidney disease is a significant condition and regular office follow-up with me (at least every 1 months for now) is important for monitoring and their health, and that it is their responsibility to make a follow-up appointment. * The patient has been counseled never to stop taking their medications without discussing it with me or another doctor. * The patient has been counseled on avoiding NSAIDs. * The patient has been counseled on risk of worsening kidney function and instructed to immediately call and speak with me and go immediately to ER with any severe symptoms, nausea, vomiting, diarrhea, chest pain, or shortness of breath. * No current indications for dialysis. NIH's "Choosing a Treatment for Kidney Failure" guide has been given. All their questions were answered. They are thinking about and choosing between transplant, peritoneal dialysis, and hemodialysis.  # HTN uncontrolled with fluid overload. * Continue torsemide.  * The patient's blood pressure was checked with the [employer] [id number]-907XL using the SPRINT trial protocol after sitting quietly in an empty room with arm supported, back supported, and feet on the floor for 5 minutes. The average of 3 readings were taken. * A counseling information sheet on blood pressure and staying healthy has been given (which they have been instructed to read). * The patient has been counseled to check their BP at home with an automatic arm cuff, write down the readings, and reach me directly on the phone immediately if they are persistently > 180 systolic or if SBP is less than 100 or if lightheadedness develops. They were counseled to bring in all blood pressure readings and medications next visit. * The patient has been counseled that regular office follow-up (at least every 1 month for now) is important for monitoring and for their health, and that it is their responsibility to make follow up appointments. * The patient also has been counseled that they must never stop or change any medications without discussing this with me (or another physician).

## 2024-05-16 NOTE — HISTORY OF PRESENT ILLNESS
[FreeTextEntry1] : Following up for hospitalization in February for CHF. He is here with his daughter who was also provided counseling.   * Good energy and appetite.eGFR 13 (avg. of CKD-EPIcr & CKD-EPIcys). *  . No hydro on US. Following up with urologist. * Torsemide increased to 20. Increased diuresis. * Doesn't check BP at home.  No SOB with exertion. No CP. No lightheadedness. Compliant with medications.   Previous history (15Apr24): * Doesn't check BP at home.  No SOB with exertion. No CP. No lightheadedness. Compliant with medications. * Toresmide 10 started last visit. Minimal additional diuresis. * eGFR 14 (avg. of CKD-EPIcr & CKD-EPIcys). 2.4 g proteinuria.  * . No hydro on US.   Previous history (13Mar24): Notes from Dr. Kennedy: "74 yo M w/ PMH of HF, HTN, hx of GIB presenting w/ headache and SOB. Admitted for HF exacerbation and HTN urgency with /125. S/p IV lasix with improvement in SOB. BP remains elevated with peak  overnight. Creatinine noted to be 4.2 on arrival, now 4.26. UA significant for 2.4g proteinuria and WBCs. Chart review reveals Cr on prior admission 2 years ago at lowest 2.0 (one value, possibly not accurate) but ranged from 3.8 - 5.2. Patient denies history of renal disease, has never seen a Nephrologist. No reported family history of renal disease. Patient denies urinary complaint. Denies significant OTC NSAID use. Nephrology consulted for further management of CKD."  Creatinine 4.55 on discharge on 06Feb24. 2.4 grams proteinuria. No hematuria. No monoclonal protein. 2.7 ratio. In 2016, creatinine was 1.8. Good energy and appetite.   The patient denies exposure to chronic NSAIDs, chronic PPIs, green smoothies, creatine, or herbal supplements. The patient denies a history of kidney stones or pyelonephritis. No DM. No significant nocturia. No recent renal ultrasound. They are unaware of proteinuria or hematuria.  HTN present for 20 years. * Doesn't check BP at home.  No lightheadedness. No CP/SOB. Compliant with medications.   SH: Social ETOH. FH: No kidney disease  Meds: amLODIPine Tablet 10 milliGRAM(s) Oral every 24 hours hydrALAZINE 50 milliGRAM(s) Oral three times a day influenza Vaccine (HIGH DOSE) 0.7 milliLiter(s) IntraMuscular once nitroglycerin SubLingual 0.4 milliGRAM(s) sodium bicarbonate 325 milliGRAM(5) Oral daily tamsulosin 0.4 milliGRAM(s) Oral at bedtime

## 2024-05-26 LAB
ALBUMIN SERPL ELPH-MCNC: 4.3 G/DL
ALP BLD-CCNC: 112 U/L
ALT SERPL-CCNC: 6 U/L
ANION GAP SERPL CALC-SCNC: 15 MMOL/L
AST SERPL-CCNC: 12 U/L
BASOPHILS # BLD AUTO: 0.09 K/UL
BASOPHILS NFR BLD AUTO: 1 %
BILIRUB SERPL-MCNC: 0.2 MG/DL
BUN SERPL-MCNC: 56 MG/DL
CALCIUM SERPL-MCNC: 8.8 MG/DL
CHLORIDE SERPL-SCNC: 107 MMOL/L
CO2 SERPL-SCNC: 18 MMOL/L
CREAT SERPL-MCNC: 5.19 MG/DL
CYSTATIN C SERPL-MCNC: 4.13 MG/L
EGFR: 11 ML/MIN/1.73M2
EOSINOPHIL # BLD AUTO: 0.26 K/UL
EOSINOPHIL NFR BLD AUTO: 2.9 %
GFR/BSA.PRED SERPLBLD CYS-BASED-ARV: 11 ML/MIN/1.73M2
GLUCOSE SERPL-MCNC: 89 MG/DL
HCT VFR BLD CALC: 27.7 %
HGB BLD-MCNC: 8.9 G/DL
IMM GRANULOCYTES NFR BLD AUTO: 0.2 %
LYMPHOCYTES # BLD AUTO: 0.91 K/UL
LYMPHOCYTES NFR BLD AUTO: 10.1 %
MAGNESIUM SERPL-MCNC: 2.1 MG/DL
MAN DIFF?: NORMAL
MCHC RBC-ENTMCNC: 27.6 PG
MCHC RBC-ENTMCNC: 32.1 GM/DL
MCV RBC AUTO: 86 FL
MONOCYTES # BLD AUTO: 0.59 K/UL
MONOCYTES NFR BLD AUTO: 6.6 %
NEUTROPHILS # BLD AUTO: 7.12 K/UL
NEUTROPHILS NFR BLD AUTO: 79.2 %
PHOSPHATE SERPL-MCNC: 3.8 MG/DL
PLATELET # BLD AUTO: 319 K/UL
POTASSIUM SERPL-SCNC: 5.3 MMOL/L
PROT SERPL-MCNC: 6.7 G/DL
RBC # BLD: 3.22 M/UL
RBC # FLD: 17.2 %
SODIUM SERPL-SCNC: 140 MMOL/L
WBC # FLD AUTO: 8.99 K/UL

## 2024-06-20 ENCOUNTER — APPOINTMENT (OUTPATIENT)
Dept: NEPHROLOGY | Facility: CLINIC | Age: 74
End: 2024-06-20
Payer: MEDICARE

## 2024-06-20 VITALS — DIASTOLIC BLOOD PRESSURE: 59 MMHG | HEART RATE: 93 BPM | SYSTOLIC BLOOD PRESSURE: 145 MMHG

## 2024-06-20 DIAGNOSIS — Z79.899 OTHER LONG TERM (CURRENT) DRUG THERAPY: ICD-10-CM

## 2024-06-20 DIAGNOSIS — I50.9 HEART FAILURE, UNSPECIFIED: ICD-10-CM

## 2024-06-20 DIAGNOSIS — R79.9 ABNORMAL FINDING OF BLOOD CHEMISTRY, UNSPECIFIED: ICD-10-CM

## 2024-06-20 DIAGNOSIS — N18.5 CHRONIC KIDNEY DISEASE, STAGE 5: ICD-10-CM

## 2024-06-20 DIAGNOSIS — I10 ESSENTIAL (PRIMARY) HYPERTENSION: ICD-10-CM

## 2024-06-20 PROCEDURE — 36415 COLL VENOUS BLD VENIPUNCTURE: CPT

## 2024-06-20 PROCEDURE — G2211 COMPLEX E/M VISIT ADD ON: CPT

## 2024-06-20 PROCEDURE — 99214 OFFICE O/P EST MOD 30 MIN: CPT

## 2024-06-20 NOTE — ASSESSMENT
[FreeTextEntry1] : -- # CKD stage 5 of unclear cause, possibly aging and hypertensive nephrosclerosis. * Recheck labs, * US showed smaller kidneys. * Therapies for kidney disease: blood pressure control; other evidence-based therapies recommended including exercise, a plant-based lower oxalate diet, and 400 mcg folic acid daily * Cardiovascular disease prevention: counseling on healthy diet, physical activity, weight loss, alcohol limitation, blood pressure control * A counseling information sheet on CKD has been given (which they have been instructed to read). * The patient has been counseled that chronic kidney disease is a significant condition and regular office follow-up with me (at least every 1 months for now) is important for monitoring and their health, and that it is their responsibility to make a follow-up appointment. * The patient has been counseled never to stop taking their medications without discussing it with me or another doctor. * The patient has been counseled on avoiding NSAIDs. * The patient has been counseled on risk of worsening kidney function and instructed to immediately call and speak with me and go immediately to ER with any severe symptoms, nausea, vomiting, diarrhea, chest pain, or shortness of breath. * No current indications for dialysis. NIH's "Choosing a Treatment for Kidney Failure" guide has been given. All their questions were answered. They are thinking about and choosing between transplant, peritoneal dialysis, and hemodialysis.  # HTN uncontrolled with fluid overload. * Continue torsemide. I do not believe BP is likely to be controlled without dialysis.  * The patient's blood pressure was checked with the [employer] [id number]-907XL using the SPRINT trial protocol after sitting quietly in an empty room with arm supported, back supported, and feet on the floor for 5 minutes. The average of 3 readings were taken. * A counseling information sheet on blood pressure and staying healthy has been given (which they have been instructed to read). * The patient has been counseled to check their BP at home with an automatic arm cuff, write down the readings, and reach me directly on the phone immediately if they are persistently > 180 systolic or if SBP is less than 100 or if lightheadedness develops. They were counseled to bring in all blood pressure readings and medications next visit. * The patient has been counseled that regular office follow-up (at least every 1 month for now) is important for monitoring and for their health, and that it is their responsibility to make follow up appointments. * The patient also has been counseled that they must never stop or change any medications without discussing this with me (or another physician).

## 2024-06-20 NOTE — PHYSICAL EXAM
[General Appearance - Alert] : alert [General Appearance - In No Acute Distress] : in no acute distress [Neck Appearance] : the appearance of the neck was normal [Neck Cervical Mass (___cm)] : no neck mass was observed [Jugular Venous Distention Increased] : there was no jugular-venous distention [Thyroid Diffuse Enlargement] : the thyroid was not enlarged [Thyroid Nodule] : there were no palpable thyroid nodules [] : no respiratory distress [Heart Rate And Rhythm] : heart rate was normal and rhythm regular [Heart Sounds] : normal S1 and S2 [Murmurs] : no murmurs [Heart Sounds Gallop] : no gallops [Heart Sounds Pericardial Friction Rub] : no pericardial rub [Edema] : there was no peripheral edema [FreeTextEntry1] : pitting ankles  [Bowel Sounds] : normal bowel sounds [Abdomen Soft] : soft [Abdomen Tenderness] : non-tender [Cervical Lymph Nodes Enlarged Posterior Bilaterally] : posterior cervical [Cervical Lymph Nodes Enlarged Anterior Bilaterally] : anterior cervical [Supraclavicular Lymph Nodes Enlarged Bilaterally] : supraclavicular

## 2024-06-20 NOTE — HISTORY OF PRESENT ILLNESS
[FreeTextEntry1] : Following up for hospitalization in February for CHF. He is here with his daughter who was also provided counseling.   eGFR 11 (avg. of CKD-EPIcr & CKD-EPIcys).  Good energy and appetite. * Taking sodium bicarb 325 TID. * Advised to follow up with urologist. No hydro. * HTN uncontrolled. Does not check BP at home.  No SOB with exertion. No CP. No lightheadedness. Compliant with medications.   Previous history (16May24): * Good energy and appetite.eGFR 13 (avg. of CKD-EPIcr & CKD-EPIcys). *  . No hydro on US. Following up with urologist. * Torsemide increased to 20. Increased diuresis. * Doesn't check BP at home.  No SOB with exertion. No CP. No lightheadedness. Compliant with medications.   Previous history (15Apr24): * Doesn't check BP at home.  No SOB with exertion. No CP. No lightheadedness. Compliant with medications. * Toresmide 10 started last visit. Minimal additional diuresis. * eGFR 14 (avg. of CKD-EPIcr & CKD-EPIcys). 2.4 g proteinuria.  * . No hydro on US.   Previous history (13Mar24): Notes from Dr. Kennedy: "74 yo M w/ PMH of HF, HTN, hx of GIB presenting w/ headache and SOB. Admitted for HF exacerbation and HTN urgency with /125. S/p IV lasix with improvement in SOB. BP remains elevated with peak  overnight. Creatinine noted to be 4.2 on arrival, now 4.26. UA significant for 2.4g proteinuria and WBCs. Chart review reveals Cr on prior admission 2 years ago at lowest 2.0 (one value, possibly not accurate) but ranged from 3.8 - 5.2. Patient denies history of renal disease, has never seen a Nephrologist. No reported family history of renal disease. Patient denies urinary complaint. Denies significant OTC NSAID use. Nephrology consulted for further management of CKD."  Creatinine 4.55 on discharge on 06Feb24. 2.4 grams proteinuria. No hematuria. No monoclonal protein. 2.7 ratio. In 2016, creatinine was 1.8. Good energy and appetite.   The patient denies exposure to chronic NSAIDs, chronic PPIs, green smoothies, creatine, or herbal supplements. The patient denies a history of kidney stones or pyelonephritis. No DM. No significant nocturia. No recent renal ultrasound. They are unaware of proteinuria or hematuria.  HTN present for 20 years. * Doesn't check BP at home.  No lightheadedness. No CP/SOB. Compliant with medications.   SH: Social ETOH. FH: No kidney disease  Meds: amLODIPine Tablet 10 milliGRAM(s) Oral every 24 hours hydrALAZINE 50 milliGRAM(s) Oral three times a day influenza Vaccine (HIGH DOSE) 0.7 milliLiter(s) IntraMuscular once nitroglycerin SubLingual 0.4 milliGRAM(s) sodium bicarbonate 325 milliGRAM(5) Oral daily tamsulosin 0.4 milliGRAM(s) Oral at bedtime

## 2024-07-01 DIAGNOSIS — E87.5 HYPERKALEMIA: ICD-10-CM

## 2024-07-01 LAB
ALBUMIN SERPL ELPH-MCNC: 4.6 G/DL
ALP BLD-CCNC: 111 U/L
ALT SERPL-CCNC: 6 U/L
ANION GAP SERPL CALC-SCNC: 15 MMOL/L
AST SERPL-CCNC: 12 U/L
BASOPHILS # BLD AUTO: 0.07 K/UL
BASOPHILS NFR BLD AUTO: 0.8 %
BILIRUB SERPL-MCNC: 0.2 MG/DL
BUN SERPL-MCNC: 62 MG/DL
CALCIUM SERPL-MCNC: 8.7 MG/DL
CHLORIDE SERPL-SCNC: 106 MMOL/L
CO2 SERPL-SCNC: 15 MMOL/L
CREAT SERPL-MCNC: 5.57 MG/DL
CYSTATIN C SERPL-MCNC: 3.93 MG/L
EGFR: 10 ML/MIN/1.73M2
EOSINOPHIL # BLD AUTO: 0.35 K/UL
EOSINOPHIL NFR BLD AUTO: 3.9 %
GFR/BSA.PRED SERPLBLD CYS-BASED-ARV: 12 ML/MIN/1.73M2
GLUCOSE SERPL-MCNC: 95 MG/DL
HCT VFR BLD CALC: 30.8 %
HGB BLD-MCNC: 9.8 G/DL
IMM GRANULOCYTES NFR BLD AUTO: 0.6 %
LYMPHOCYTES # BLD AUTO: 1.06 K/UL
LYMPHOCYTES NFR BLD AUTO: 11.7 %
MAGNESIUM SERPL-MCNC: 1.9 MG/DL
MAN DIFF?: NORMAL
MCHC RBC-ENTMCNC: 27.5 PG
MCHC RBC-ENTMCNC: 31.8 GM/DL
MCV RBC AUTO: 86.5 FL
MONOCYTES # BLD AUTO: 0.54 K/UL
MONOCYTES NFR BLD AUTO: 6 %
NEUTROPHILS # BLD AUTO: 6.98 K/UL
NEUTROPHILS NFR BLD AUTO: 77 %
PHOSPHATE SERPL-MCNC: 3.6 MG/DL
PLATELET # BLD AUTO: 275 K/UL
POTASSIUM SERPL-SCNC: 5.6 MMOL/L
PROT SERPL-MCNC: 7 G/DL
RBC # BLD: 3.56 M/UL
RBC # FLD: 15.4 %
SODIUM SERPL-SCNC: 136 MMOL/L
WBC # FLD AUTO: 9.05 K/UL

## 2024-07-01 RX ORDER — SODIUM ZIRCONIUM CYCLOSILICATE 10 G/10G
10 POWDER, FOR SUSPENSION ORAL
Qty: 30 | Refills: 5 | Status: ACTIVE | COMMUNITY
Start: 2024-07-01 | End: 1900-01-01

## 2024-08-13 ENCOUNTER — APPOINTMENT (OUTPATIENT)
Dept: NEPHROLOGY | Facility: CLINIC | Age: 74
End: 2024-08-13
Payer: MEDICARE

## 2024-08-13 VITALS — DIASTOLIC BLOOD PRESSURE: 66 MMHG | SYSTOLIC BLOOD PRESSURE: 146 MMHG

## 2024-08-13 DIAGNOSIS — R68.89 OTHER GENERAL SYMPTOMS AND SIGNS: ICD-10-CM

## 2024-08-13 DIAGNOSIS — N18.5 CHRONIC KIDNEY DISEASE, STAGE 5: ICD-10-CM

## 2024-08-13 DIAGNOSIS — E87.5 HYPERKALEMIA: ICD-10-CM

## 2024-08-13 DIAGNOSIS — R79.9 ABNORMAL FINDING OF BLOOD CHEMISTRY, UNSPECIFIED: ICD-10-CM

## 2024-08-13 DIAGNOSIS — I10 ESSENTIAL (PRIMARY) HYPERTENSION: ICD-10-CM

## 2024-08-13 PROCEDURE — 99214 OFFICE O/P EST MOD 30 MIN: CPT

## 2024-08-13 PROCEDURE — G2211 COMPLEX E/M VISIT ADD ON: CPT

## 2024-08-13 PROCEDURE — 36415 COLL VENOUS BLD VENIPUNCTURE: CPT

## 2024-08-13 NOTE — ASSESSMENT
[FreeTextEntry1] : # CKD stage 5 of unclear cause, possibly aging and hypertensive nephrosclerosis. * Recheck labs. * Therapies for kidney disease: blood pressure control; other evidence-based therapies recommended including exercise, a plant-based lower oxalate diet, and 400 mcg folic acid daily * Cardiovascular disease prevention: counseling on healthy diet, physical activity, weight loss, alcohol limitation, blood pressure control * A counseling information sheet on CKD has been given (which they have been instructed to read). * The patient has been counseled that chronic kidney disease is a significant condition and regular office follow-up with me (at least every 1 months for now) is important for monitoring and their health, and that it is their responsibility to make a follow-up appointment. * The patient has been counseled never to stop taking their medications without discussing it with me or another doctor. * The patient has been counseled on avoiding NSAIDs. * The patient has been counseled on risk of worsening kidney function and instructed to immediately call and speak with me and go immediately to ER with any severe symptoms, nausea, vomiting, diarrhea, chest pain, or shortness of breath. * No current indications for dialysis. NIH's "Choosing a Treatment for Kidney Failure" guide has been given. All their questions were answered. They are thinking about and choosing between transplant, peritoneal dialysis, and hemodialysis.  # HTN uncontrolled with fluid overload. * Continue torsemide. I do not believe BP is likely to be controlled without dialysis. * The patient's blood pressure was checked with the [employer] [id number]-907XL using the SPRINT trial protocol after sitting quietly in an empty room with arm supported, back supported, and feet on the floor for 5 minutes. The average of 3 readings were taken. * A counseling information sheet on blood pressure and staying healthy has been given (which they have been instructed to read). * The patient has been counseled to check their BP at home with an automatic arm cuff, write down the readings, and reach me directly on the phone immediately if they are persistently > 180 systolic or if SBP is less than 100 or if lightheadedness develops. They were counseled to bring in all blood pressure readings and medications next visit. * The patient has been counseled that regular office follow-up (at least every 1 month for now) is important for monitoring and for their health, and that it is their responsibility to make follow up appointments. * The patient also has been counseled that they must never stop or change any medications without discussing this with me (or another physician).  # Hyperkalemia. * Low K diet. * Cont lokelma.

## 2024-08-13 NOTE — HISTORY OF PRESENT ILLNESS
[FreeTextEntry1] : Following up for hospitalization in February for CHF. He is here with his daughter who was also provided counseling.   *  eGFR 11 (avg. of CKD-EPIcr & CKD-EPIcys).  Good energy and appetite.* K 5.6. Started on lokelma. * HTN uncontrolled. Does not check BP at home.  No SOB with exertion. No CP. No lightheadedness. Compliant with medications.   Previous history (20Jun24): eGFR 11 (avg. of CKD-EPIcr & CKD-EPIcys).  Good energy and appetite. * Taking sodium bicarb 325 TID. * Advised to follow up with urologist. No hydro. * HTN uncontrolled. Does not check BP at home.  No SOB with exertion. No CP. No lightheadedness. Compliant with medications.   Previous history (16May24): * Good energy and appetite.eGFR 13 (avg. of CKD-EPIcr & CKD-EPIcys). *  . No hydro on US. Following up with urologist. * Torsemide increased to 20. Increased diuresis. * Doesn't check BP at home.  No SOB with exertion. No CP. No lightheadedness. Compliant with medications.   Previous history (15Apr24): * Doesn't check BP at home.  No SOB with exertion. No CP. No lightheadedness. Compliant with medications. * Toresmide 10 started last visit. Minimal additional diuresis. * eGFR 14 (avg. of CKD-EPIcr & CKD-EPIcys). 2.4 g proteinuria.  * . No hydro on US.   Previous history (13Mar24): Notes from Dr. Kennedy: "74 yo M w/ PMH of HF, HTN, hx of GIB presenting w/ headache and SOB. Admitted for HF exacerbation and HTN urgency with /125. S/p IV lasix with improvement in SOB. BP remains elevated with peak  overnight. Creatinine noted to be 4.2 on arrival, now 4.26. UA significant for 2.4g proteinuria and WBCs. Chart review reveals Cr on prior admission 2 years ago at lowest 2.0 (one value, possibly not accurate) but ranged from 3.8 - 5.2. Patient denies history of renal disease, has never seen a Nephrologist. No reported family history of renal disease. Patient denies urinary complaint. Denies significant OTC NSAID use. Nephrology consulted for further management of CKD."  Creatinine 4.55 on discharge on 06Feb24. 2.4 grams proteinuria. No hematuria. No monoclonal protein. 2.7 ratio. In 2016, creatinine was 1.8. Good energy and appetite.   The patient denies exposure to chronic NSAIDs, chronic PPIs, green smoothies, creatine, or herbal supplements. The patient denies a history of kidney stones or pyelonephritis. No DM. No significant nocturia. No recent renal ultrasound. They are unaware of proteinuria or hematuria.  HTN present for 20 years. * Doesn't check BP at home.  No lightheadedness. No CP/SOB. Compliant with medications.   SH: Social ETOH. FH: No kidney disease  Meds: amLODIPine Tablet 10 milliGRAM(s) Oral every 24 hours hydrALAZINE 50 milliGRAM(s) Oral three times a day influenza Vaccine (HIGH DOSE) 0.7 milliLiter(s) IntraMuscular once nitroglycerin SubLingual 0.4 milliGRAM(s) sodium bicarbonate 325 milliGRAM(5) Oral daily tamsulosin 0.4 milliGRAM(s) Oral at bedtime

## 2024-08-14 LAB
ALBUMIN SERPL ELPH-MCNC: 4.4 G/DL
ALP BLD-CCNC: 117 U/L
ALT SERPL-CCNC: 9 U/L
ANION GAP SERPL CALC-SCNC: 19 MMOL/L
AST SERPL-CCNC: 20 U/L
BASOPHILS # BLD AUTO: 0.06 K/UL
BASOPHILS NFR BLD AUTO: 0.6 %
BILIRUB SERPL-MCNC: 0.2 MG/DL
BUN SERPL-MCNC: 52 MG/DL
CALCIUM SERPL-MCNC: 8.8 MG/DL
CHLORIDE SERPL-SCNC: 104 MMOL/L
CO2 SERPL-SCNC: 17 MMOL/L
CREAT SERPL-MCNC: 4.88 MG/DL
CYSTATIN C SERPL-MCNC: 4.08 MG/L
EGFR: 12 ML/MIN/1.73M2
EOSINOPHIL # BLD AUTO: 0.42 K/UL
EOSINOPHIL NFR BLD AUTO: 3.9 %
GFR/BSA.PRED SERPLBLD CYS-BASED-ARV: 12 ML/MIN/1.73M2
GLUCOSE SERPL-MCNC: 88 MG/DL
HCT VFR BLD CALC: 31.6 %
HGB BLD-MCNC: 9.9 G/DL
IMM GRANULOCYTES NFR BLD AUTO: 0.3 %
LYMPHOCYTES # BLD AUTO: 1.27 K/UL
LYMPHOCYTES NFR BLD AUTO: 11.8 %
MAGNESIUM SERPL-MCNC: 2 MG/DL
MAN DIFF?: NORMAL
MCHC RBC-ENTMCNC: 27.2 PG
MCHC RBC-ENTMCNC: 31.3 GM/DL
MCV RBC AUTO: 86.8 FL
MONOCYTES # BLD AUTO: 0.61 K/UL
MONOCYTES NFR BLD AUTO: 5.7 %
NEUTROPHILS # BLD AUTO: 8.37 K/UL
NEUTROPHILS NFR BLD AUTO: 77.7 %
PHOSPHATE SERPL-MCNC: 3.3 MG/DL
PLATELET # BLD AUTO: 355 K/UL
POTASSIUM SERPL-SCNC: 4.3 MMOL/L
PROT SERPL-MCNC: 7 G/DL
RBC # BLD: 3.64 M/UL
RBC # FLD: 14.9 %
SODIUM SERPL-SCNC: 140 MMOL/L
WBC # FLD AUTO: 10.76 K/UL

## 2024-09-18 ENCOUNTER — APPOINTMENT (OUTPATIENT)
Dept: NEPHROLOGY | Facility: CLINIC | Age: 74
End: 2024-09-18
Payer: MEDICARE

## 2024-09-18 VITALS — SYSTOLIC BLOOD PRESSURE: 166 MMHG | HEART RATE: 80 BPM | DIASTOLIC BLOOD PRESSURE: 56 MMHG

## 2024-09-18 DIAGNOSIS — I50.9 HEART FAILURE, UNSPECIFIED: ICD-10-CM

## 2024-09-18 DIAGNOSIS — E87.5 HYPERKALEMIA: ICD-10-CM

## 2024-09-18 DIAGNOSIS — E78.49 OTHER HYPERLIPIDEMIA: ICD-10-CM

## 2024-09-18 DIAGNOSIS — Z13.1 ENCOUNTER FOR SCREENING FOR DIABETES MELLITUS: ICD-10-CM

## 2024-09-18 DIAGNOSIS — Z79.899 OTHER LONG TERM (CURRENT) DRUG THERAPY: ICD-10-CM

## 2024-09-18 DIAGNOSIS — R68.89 OTHER GENERAL SYMPTOMS AND SIGNS: ICD-10-CM

## 2024-09-18 DIAGNOSIS — D63.1 CHRONIC KIDNEY DISEASE, STAGE 4 (SEVERE): ICD-10-CM

## 2024-09-18 DIAGNOSIS — R79.9 ABNORMAL FINDING OF BLOOD CHEMISTRY, UNSPECIFIED: ICD-10-CM

## 2024-09-18 DIAGNOSIS — N18.5 CHRONIC KIDNEY DISEASE, STAGE 5: ICD-10-CM

## 2024-09-18 DIAGNOSIS — N18.4 CHRONIC KIDNEY DISEASE, STAGE 4 (SEVERE): ICD-10-CM

## 2024-09-18 PROCEDURE — G2211 COMPLEX E/M VISIT ADD ON: CPT

## 2024-09-18 PROCEDURE — 99214 OFFICE O/P EST MOD 30 MIN: CPT

## 2024-09-18 PROCEDURE — 36415 COLL VENOUS BLD VENIPUNCTURE: CPT

## 2024-09-18 RX ORDER — CARVEDILOL 6.25 MG/1
6.25 TABLET, FILM COATED ORAL TWICE DAILY
Qty: 180 | Refills: 0 | Status: ACTIVE | COMMUNITY
Start: 2024-09-18 | End: 1900-01-01

## 2024-09-18 NOTE — PHYSICAL EXAM
[General Appearance - Alert] : alert [General Appearance - In No Acute Distress] : in no acute distress [Neck Appearance] : the appearance of the neck was normal [Neck Cervical Mass (___cm)] : no neck mass was observed [Jugular Venous Distention Increased] : there was no jugular-venous distention [Thyroid Diffuse Enlargement] : the thyroid was not enlarged [Thyroid Nodule] : there were no palpable thyroid nodules [] : no respiratory distress [Heart Rate And Rhythm] : heart rate was normal and rhythm regular [Heart Sounds] : normal S1 and S2 [Heart Sounds Gallop] : no gallops [Murmurs] : no murmurs [Heart Sounds Pericardial Friction Rub] : no pericardial rub [Edema] : there was no peripheral edema [Bowel Sounds] : normal bowel sounds [Abdomen Soft] : soft [Abdomen Tenderness] : non-tender [Cervical Lymph Nodes Enlarged Posterior Bilaterally] : posterior cervical [Cervical Lymph Nodes Enlarged Anterior Bilaterally] : anterior cervical [Supraclavicular Lymph Nodes Enlarged Bilaterally] : supraclavicular [FreeTextEntry1] : pitting ankles

## 2024-09-18 NOTE — ASSESSMENT
[FreeTextEntry1] : -- # CKD stage 5 of unclear cause, possibly aging and hypertensive nephrosclerosis. * Recheck labs. * Therapies for kidney disease: blood pressure control; other evidence-based therapies recommended including exercise, a plant-based lower oxalate diet, and 400 mcg folic acid daily * Cardiovascular disease prevention: counseling on healthy diet, physical activity, weight loss, alcohol limitation, blood pressure control * A counseling information sheet on CKD has been given (which they have been instructed to read). * The patient has been counseled that chronic kidney disease is a significant condition and regular office follow-up with me (at least every 1 months for now) is important for monitoring and their health, and that it is their responsibility to make a follow-up appointment. * The patient has been counseled never to stop taking their medications without discussing it with me or another doctor. * The patient has been counseled on avoiding NSAIDs. * The patient has been counseled on risk of worsening kidney function and instructed to immediately call and speak with me and go immediately to ER with any severe symptoms, nausea, vomiting, diarrhea, chest pain, or shortness of breath. * No current indications for dialysis. NIH's "Choosing a Treatment for Kidney Failure" guide has been given. All their questions were answered. They are thinking about and choosing between transplant, peritoneal dialysis, and hemodialysis.  # HTN uncontrolled with fluid overload. * Continue torsemide. I do not believe BP is likely to be controlled without dialysis.  * Start coreg 6.25 mg bid. (He says he has had no problems previosuly with this medication.)  * The patient's blood pressure was checked with the [employer] [id number]-907XL using the SPRINT trial protocol after sitting quietly in an empty room with arm supported, back supported, and feet on the floor for 5 minutes. The average of 3 readings were taken. * A counseling information sheet on blood pressure and staying healthy has been given (which they have been instructed to read). * The patient has been counseled to check their BP at home with an automatic arm cuff, write down the readings, and reach me directly on the phone immediately if they are persistently > 180 systolic or if SBP is less than 100 or if lightheadedness develops. They were counseled to bring in all blood pressure readings and medications next visit. * The patient has been counseled that regular office follow-up (at least every 1 month for now) is important for monitoring and for their health, and that it is their responsibility to make follow up appointments. * The patient also has been counseled that they must never stop or change any medications without discussing this with me (or another physician).  # Hyperkalemia. * Low K diet. * Cont lokelma.

## 2024-09-18 NOTE — HISTORY OF PRESENT ILLNESS
[FreeTextEntry1] : Following up for hospitalization in February for CHF. He is here with his daughter who was also provided counseling.   * eGFR 12 (avg. of CKD-EPIcr & CKD-EPIcys). Good energy and appetite. * K down to 4.3 on lokelma. * Doesn't check BP at home.  No SOB with exertion. No CP. No lightheadedness. Compliant with medications.   Previous history (13Aug24): * eGFR 11 (avg. of CKD-EPIcr & CKD-EPIcys).  Good energy and appetite.* K 5.6. Started on lokelma. * HTN uncontrolled. Does not check BP at home.  No SOB with exertion. No CP. No lightheadedness. Compliant with medications.   Previous history (20Jun24): eGFR 11 (avg. of CKD-EPIcr & CKD-EPIcys).  Good energy and appetite. * Taking sodium bicarb 325 TID. * Advised to follow up with urologist. No hydro. * HTN uncontrolled. Does not check BP at home.  No SOB with exertion. No CP. No lightheadedness. Compliant with medications.   Previous history (16May24): * Good energy and appetite.eGFR 13 (avg. of CKD-EPIcr & CKD-EPIcys). *  . No hydro on US. Following up with urologist. * Torsemide increased to 20. Increased diuresis. * Doesn't check BP at home.  No SOB with exertion. No CP. No lightheadedness. Compliant with medications.   Previous history (15Apr24): * Doesn't check BP at home.  No SOB with exertion. No CP. No lightheadedness. Compliant with medications. * Toresmide 10 started last visit. Minimal additional diuresis. * eGFR 14 (avg. of CKD-EPIcr & CKD-EPIcys). 2.4 g proteinuria.  * . No hydro on US.   Previous history (13Mar24): Notes from Dr. Kennedy: "72 yo M w/ PMH of HF, HTN, hx of GIB presenting w/ headache and SOB. Admitted for HF exacerbation and HTN urgency with /125. S/p IV lasix with improvement in SOB. BP remains elevated with peak  overnight. Creatinine noted to be 4.2 on arrival, now 4.26. UA significant for 2.4g proteinuria and WBCs. Chart review reveals Cr on prior admission 2 years ago at lowest 2.0 (one value, possibly not accurate) but ranged from 3.8 - 5.2. Patient denies history of renal disease, has never seen a Nephrologist. No reported family history of renal disease. Patient denies urinary complaint. Denies significant OTC NSAID use. Nephrology consulted for further management of CKD."  Creatinine 4.55 on discharge on 06Feb24. 2.4 grams proteinuria. No hematuria. No monoclonal protein. 2.7 ratio. In 2016, creatinine was 1.8. Good energy and appetite.   The patient denies exposure to chronic NSAIDs, chronic PPIs, green smoothies, creatine, or herbal supplements. The patient denies a history of kidney stones or pyelonephritis. No DM. No significant nocturia. No recent renal ultrasound. They are unaware of proteinuria or hematuria.  HTN present for 20 years. * Doesn't check BP at home.  No lightheadedness. No CP/SOB. Compliant with medications.   SH: Social ETOH. FH: No kidney disease  Meds: amLODIPine Tablet 10 milliGRAM(s) Oral every 24 hours hydrALAZINE 50 milliGRAM(s) Oral three times a day influenza Vaccine (HIGH DOSE) 0.7 milliLiter(s) IntraMuscular once nitroglycerin SubLingual 0.4 milliGRAM(s) sodium bicarbonate 325 milliGRAM(5) Oral daily tamsulosin 0.4 milliGRAM(s) Oral at bedtime

## 2024-09-19 NOTE — DIETITIAN INITIAL EVALUATION ADULT - PATIENT MEETS CRITERIA FOR MALNUTRITION
- Doing well, tolerating normal diet. Denies abdominal pain       
- NKDA  - POCT urine dip shows +blood, although pt states she is on her period    Plan:  - Will start Macrobid given symptomatic  - F/u UCX    Orders:    POCT urine dip    nitrofurantoin (MACROBID) 100 mg capsule; Take 1 capsule (100 mg total) by mouth 2 (two) times a day for 5 days    
no

## 2024-09-21 LAB
ALBUMIN SERPL ELPH-MCNC: 4.5 G/DL
ALP BLD-CCNC: 121 U/L
ALT SERPL-CCNC: 6 U/L
ANION GAP SERPL CALC-SCNC: 17 MMOL/L
AST SERPL-CCNC: 10 U/L
BASOPHILS # BLD AUTO: 0.06 K/UL
BASOPHILS NFR BLD AUTO: 0.6 %
BILIRUB SERPL-MCNC: 0.2 MG/DL
BUN SERPL-MCNC: 53 MG/DL
CALCIUM SERPL-MCNC: 8.7 MG/DL
CHLORIDE SERPL-SCNC: 105 MMOL/L
CO2 SERPL-SCNC: 19 MMOL/L
CREAT SERPL-MCNC: 4.92 MG/DL
CYSTATIN C SERPL-MCNC: 3.94 MG/L
EGFR: 12 ML/MIN/1.73M2
EOSINOPHIL # BLD AUTO: 0.35 K/UL
EOSINOPHIL NFR BLD AUTO: 3.3 %
GFR/BSA.PRED SERPLBLD CYS-BASED-ARV: 12 ML/MIN/1.73M2
GLUCOSE SERPL-MCNC: 90 MG/DL
HCT VFR BLD CALC: 31.1 %
HGB BLD-MCNC: 9.9 G/DL
IMM GRANULOCYTES NFR BLD AUTO: 0.5 %
LYMPHOCYTES # BLD AUTO: 1.14 K/UL
LYMPHOCYTES NFR BLD AUTO: 10.9 %
MAGNESIUM SERPL-MCNC: 2.3 MG/DL
MAN DIFF?: NORMAL
MCHC RBC-ENTMCNC: 27.2 PG
MCHC RBC-ENTMCNC: 31.8 GM/DL
MCV RBC AUTO: 85.4 FL
MONOCYTES # BLD AUTO: 0.76 K/UL
MONOCYTES NFR BLD AUTO: 7.3 %
NEUTROPHILS # BLD AUTO: 8.11 K/UL
NEUTROPHILS NFR BLD AUTO: 77.4 %
PHOSPHATE SERPL-MCNC: 2.6 MG/DL
PLATELET # BLD AUTO: 360 K/UL
POTASSIUM SERPL-SCNC: 4.7 MMOL/L
PROT SERPL-MCNC: 7.1 G/DL
RBC # BLD: 3.64 M/UL
RBC # FLD: 15.2 %
SODIUM SERPL-SCNC: 140 MMOL/L
WBC # FLD AUTO: 10.47 K/UL

## 2024-09-30 ENCOUNTER — RX RENEWAL (OUTPATIENT)
Age: 74
End: 2024-09-30

## 2024-10-01 NOTE — ED ADULT NURSE NOTE - HISTORY OF COVID-19 VACCINATION
----- Message from [employer] sent at 10/1/2024  3:07 PM CDT -----  Type : Patient Call      Who Called : Patient      Does the patient know what this is regarding?: Pain Dairy     9/30 -   3:30 Pain 6  4;30p - Pain 5  5:30- Pain 4  Sleep  7:30 - Pain 3  8;30 - Pain 3    10/1 -   This morning- Pain 3   2:30p -pain  4    Would the patient rather a call back or a response via [facility]? Call      Best Call Back Number: [phone]      Additional Information:   Yes

## 2024-11-04 ENCOUNTER — NON-APPOINTMENT (OUTPATIENT)
Age: 74
End: 2024-11-04

## 2024-11-04 ENCOUNTER — APPOINTMENT (OUTPATIENT)
Dept: NEPHROLOGY | Facility: CLINIC | Age: 74
End: 2024-11-04
Payer: MEDICARE

## 2024-11-04 VITALS — DIASTOLIC BLOOD PRESSURE: 59 MMHG | SYSTOLIC BLOOD PRESSURE: 172 MMHG | HEART RATE: 79 BPM

## 2024-11-04 DIAGNOSIS — I10 ESSENTIAL (PRIMARY) HYPERTENSION: ICD-10-CM

## 2024-11-04 DIAGNOSIS — E78.49 OTHER HYPERLIPIDEMIA: ICD-10-CM

## 2024-11-04 DIAGNOSIS — Z79.899 OTHER LONG TERM (CURRENT) DRUG THERAPY: ICD-10-CM

## 2024-11-04 DIAGNOSIS — R68.89 OTHER GENERAL SYMPTOMS AND SIGNS: ICD-10-CM

## 2024-11-04 DIAGNOSIS — N18.5 CHRONIC KIDNEY DISEASE, STAGE 5: ICD-10-CM

## 2024-11-04 DIAGNOSIS — E87.5 HYPERKALEMIA: ICD-10-CM

## 2024-11-04 DIAGNOSIS — R79.9 ABNORMAL FINDING OF BLOOD CHEMISTRY, UNSPECIFIED: ICD-10-CM

## 2024-11-04 DIAGNOSIS — D63.1 CHRONIC KIDNEY DISEASE, STAGE 4 (SEVERE): ICD-10-CM

## 2024-11-04 DIAGNOSIS — N18.4 CHRONIC KIDNEY DISEASE, STAGE 4 (SEVERE): ICD-10-CM

## 2024-11-04 PROCEDURE — 36415 COLL VENOUS BLD VENIPUNCTURE: CPT

## 2024-11-04 PROCEDURE — G2211 COMPLEX E/M VISIT ADD ON: CPT

## 2024-11-04 PROCEDURE — 99214 OFFICE O/P EST MOD 30 MIN: CPT

## 2024-11-12 DIAGNOSIS — E78.00 PURE HYPERCHOLESTEROLEMIA, UNSPECIFIED: ICD-10-CM

## 2024-11-12 LAB
25(OH)D3 SERPL-MCNC: 37.4 NG/ML
ALBUMIN SERPL ELPH-MCNC: 4.3 G/DL
ALP BLD-CCNC: 133 U/L
ALT SERPL-CCNC: 10 U/L
ANION GAP SERPL CALC-SCNC: 18 MMOL/L
AST SERPL-CCNC: 15 U/L
BASOPHILS # BLD AUTO: 0.06 K/UL
BASOPHILS NFR BLD AUTO: 0.5 %
BILIRUB SERPL-MCNC: 0.2 MG/DL
BUN SERPL-MCNC: 49 MG/DL
CALCIUM SERPL-MCNC: 9.1 MG/DL
CALCIUM SERPL-MCNC: 9.1 MG/DL
CHLORIDE SERPL-SCNC: 103 MMOL/L
CHOLEST SERPL-MCNC: 238 MG/DL
CO2 SERPL-SCNC: 18 MMOL/L
CREAT SERPL-MCNC: 4.46 MG/DL
CYSTATIN C SERPL-MCNC: 3.84 MG/L
EGFR: 13 ML/MIN/1.73M2
EOSINOPHIL # BLD AUTO: 0.8 K/UL
EOSINOPHIL NFR BLD AUTO: 6.1 %
ESTIMATED AVERAGE GLUCOSE: 97 MG/DL
FERRITIN SERPL-MCNC: 52 NG/ML
GFR/BSA.PRED SERPLBLD CYS-BASED-ARV: 12 ML/MIN/1.73M2
GLUCOSE SERPL-MCNC: 79 MG/DL
HBA1C MFR BLD HPLC: 5 %
HCT VFR BLD CALC: 30.8 %
HDLC SERPL-MCNC: 65 MG/DL
HGB BLD-MCNC: 9.7 G/DL
IMM GRANULOCYTES NFR BLD AUTO: 0.3 %
LDLC SERPL CALC-MCNC: 162 MG/DL
LYMPHOCYTES # BLD AUTO: 1.22 K/UL
LYMPHOCYTES NFR BLD AUTO: 9.3 %
MAGNESIUM SERPL-MCNC: 2.5 MG/DL
MAN DIFF?: NORMAL
MCHC RBC-ENTMCNC: 27 PG
MCHC RBC-ENTMCNC: 31.5 G/DL
MCV RBC AUTO: 85.8 FL
MONOCYTES # BLD AUTO: 0.97 K/UL
MONOCYTES NFR BLD AUTO: 7.4 %
NEUTROPHILS # BLD AUTO: 9.98 K/UL
NEUTROPHILS NFR BLD AUTO: 76.4 %
NONHDLC SERPL-MCNC: 174 MG/DL
PARATHYROID HORMONE INTACT: 229 PG/ML
PHOSPHATE SERPL-MCNC: 2.4 MG/DL
PLATELET # BLD AUTO: 313 K/UL
POTASSIUM SERPL-SCNC: 4.5 MMOL/L
PROT SERPL-MCNC: 7.1 G/DL
RBC # BLD: 3.59 M/UL
RBC # FLD: 16.2 %
SODIUM SERPL-SCNC: 139 MMOL/L
TRIGL SERPL-MCNC: 70 MG/DL
TSH SERPL-ACNC: 2.74 UIU/ML
VIT B12 SERPL-MCNC: 740 PG/ML
WBC # FLD AUTO: 13.07 K/UL

## 2024-11-12 RX ORDER — ATORVASTATIN CALCIUM 20 MG/1
20 TABLET, FILM COATED ORAL
Qty: 90 | Refills: 3 | Status: ACTIVE | COMMUNITY
Start: 2024-11-12 | End: 1900-01-01

## 2024-12-13 ENCOUNTER — APPOINTMENT (OUTPATIENT)
Dept: NEPHROLOGY | Facility: CLINIC | Age: 74
End: 2024-12-13
Payer: MEDICARE

## 2024-12-13 VITALS — BODY MASS INDEX: 23.87 KG/M2 | WEIGHT: 157 LBS

## 2024-12-13 VITALS — HEART RATE: 72 BPM | SYSTOLIC BLOOD PRESSURE: 146 MMHG | DIASTOLIC BLOOD PRESSURE: 79 MMHG

## 2024-12-13 DIAGNOSIS — N18.5 CHRONIC KIDNEY DISEASE, STAGE 5: ICD-10-CM

## 2024-12-13 DIAGNOSIS — I50.9 HEART FAILURE, UNSPECIFIED: ICD-10-CM

## 2024-12-13 DIAGNOSIS — Z79.899 OTHER LONG TERM (CURRENT) DRUG THERAPY: ICD-10-CM

## 2024-12-13 DIAGNOSIS — R68.89 OTHER GENERAL SYMPTOMS AND SIGNS: ICD-10-CM

## 2024-12-13 DIAGNOSIS — R79.9 ABNORMAL FINDING OF BLOOD CHEMISTRY, UNSPECIFIED: ICD-10-CM

## 2024-12-13 DIAGNOSIS — I10 ESSENTIAL (PRIMARY) HYPERTENSION: ICD-10-CM

## 2024-12-13 DIAGNOSIS — E87.5 HYPERKALEMIA: ICD-10-CM

## 2024-12-13 PROCEDURE — G2211 COMPLEX E/M VISIT ADD ON: CPT

## 2024-12-13 PROCEDURE — 36415 COLL VENOUS BLD VENIPUNCTURE: CPT

## 2024-12-13 PROCEDURE — 99214 OFFICE O/P EST MOD 30 MIN: CPT

## 2024-12-14 LAB
ALBUMIN SERPL ELPH-MCNC: 4.2 G/DL
ALP BLD-CCNC: 118 U/L
ALT SERPL-CCNC: 7 U/L
ANION GAP SERPL CALC-SCNC: 14 MMOL/L
AST SERPL-CCNC: 12 U/L
BASOPHILS # BLD AUTO: 0.07 K/UL
BASOPHILS NFR BLD AUTO: 0.8 %
BILIRUB SERPL-MCNC: 0.2 MG/DL
BUN SERPL-MCNC: 45 MG/DL
CALCIUM SERPL-MCNC: 8.5 MG/DL
CHLORIDE SERPL-SCNC: 107 MMOL/L
CO2 SERPL-SCNC: 22 MMOL/L
CREAT SERPL-MCNC: 4.23 MG/DL
CYSTATIN C SERPL-MCNC: 3.51 MG/L
EGFR: 14 ML/MIN/1.73M2
EOSINOPHIL # BLD AUTO: 0.74 K/UL
EOSINOPHIL NFR BLD AUTO: 8.9 %
GFR/BSA.PRED SERPLBLD CYS-BASED-ARV: 14 ML/MIN/1.73M2
GLUCOSE SERPL-MCNC: 92 MG/DL
HCT VFR BLD CALC: 30.2 %
HGB BLD-MCNC: 9.6 G/DL
IMM GRANULOCYTES NFR BLD AUTO: 0.2 %
LYMPHOCYTES # BLD AUTO: 1.24 K/UL
LYMPHOCYTES NFR BLD AUTO: 14.9 %
MAGNESIUM SERPL-MCNC: 2 MG/DL
MAN DIFF?: NORMAL
MCHC RBC-ENTMCNC: 27.2 PG
MCHC RBC-ENTMCNC: 31.8 G/DL
MCV RBC AUTO: 85.6 FL
MONOCYTES # BLD AUTO: 0.66 K/UL
MONOCYTES NFR BLD AUTO: 7.9 %
NEUTROPHILS # BLD AUTO: 5.62 K/UL
NEUTROPHILS NFR BLD AUTO: 67.3 %
PHOSPHATE SERPL-MCNC: 2.4 MG/DL
PLATELET # BLD AUTO: 278 K/UL
POTASSIUM SERPL-SCNC: 4 MMOL/L
PROT SERPL-MCNC: 6.7 G/DL
RBC # BLD: 3.53 M/UL
RBC # FLD: 15.8 %
SODIUM SERPL-SCNC: 143 MMOL/L
WBC # FLD AUTO: 8.35 K/UL

## 2025-01-01 ENCOUNTER — RX RENEWAL (OUTPATIENT)
Age: 75
End: 2025-01-01

## 2025-01-01 RX ORDER — TORSEMIDE 10 MG/1
10 TABLET ORAL
Qty: 90 | Refills: 3 | Status: ACTIVE | COMMUNITY
Start: 2025-01-01 | End: 1900-01-01

## 2025-01-24 ENCOUNTER — NON-APPOINTMENT (OUTPATIENT)
Age: 75
End: 2025-01-24

## 2025-01-24 ENCOUNTER — APPOINTMENT (OUTPATIENT)
Dept: NEPHROLOGY | Facility: CLINIC | Age: 75
End: 2025-01-24
Payer: MEDICARE

## 2025-01-24 VITALS — BODY MASS INDEX: 24.02 KG/M2 | WEIGHT: 158 LBS

## 2025-01-24 VITALS — SYSTOLIC BLOOD PRESSURE: 164 MMHG | DIASTOLIC BLOOD PRESSURE: 52 MMHG | HEART RATE: 61 BPM

## 2025-01-24 DIAGNOSIS — I50.9 HEART FAILURE, UNSPECIFIED: ICD-10-CM

## 2025-01-24 DIAGNOSIS — I10 ESSENTIAL (PRIMARY) HYPERTENSION: ICD-10-CM

## 2025-01-24 DIAGNOSIS — N18.5 CHRONIC KIDNEY DISEASE, STAGE 5: ICD-10-CM

## 2025-01-24 DIAGNOSIS — E87.5 HYPERKALEMIA: ICD-10-CM

## 2025-01-24 PROCEDURE — G2211 COMPLEX E/M VISIT ADD ON: CPT

## 2025-01-24 PROCEDURE — 99214 OFFICE O/P EST MOD 30 MIN: CPT

## 2025-01-24 PROCEDURE — 36415 COLL VENOUS BLD VENIPUNCTURE: CPT

## 2025-01-25 LAB
ALBUMIN SERPL ELPH-MCNC: 4.7 G/DL
ALP BLD-CCNC: 131 U/L
ALT SERPL-CCNC: 8 U/L
ANION GAP SERPL CALC-SCNC: 18 MMOL/L
AST SERPL-CCNC: 18 U/L
BASOPHILS # BLD AUTO: 0.06 K/UL
BASOPHILS NFR BLD AUTO: 0.7 %
BILIRUB SERPL-MCNC: 0.3 MG/DL
BUN SERPL-MCNC: 39 MG/DL
CALCIUM SERPL-MCNC: 8.9 MG/DL
CHLORIDE SERPL-SCNC: 105 MMOL/L
CO2 SERPL-SCNC: 16 MMOL/L
CREAT SERPL-MCNC: 4.3 MG/DL
CYSTATIN C SERPL-MCNC: 4.23 MG/L
EGFR: 14 ML/MIN/1.73M2
EOSINOPHIL # BLD AUTO: 0.51 K/UL
EOSINOPHIL NFR BLD AUTO: 5.6 %
GFR/BSA.PRED SERPLBLD CYS-BASED-ARV: 11 ML/MIN/1.73M2
GLUCOSE SERPL-MCNC: 83 MG/DL
HCT VFR BLD CALC: 30.1 %
HGB BLD-MCNC: 9.8 G/DL
IMM GRANULOCYTES NFR BLD AUTO: 0.2 %
LYMPHOCYTES # BLD AUTO: 0.85 K/UL
LYMPHOCYTES NFR BLD AUTO: 9.4 %
MAGNESIUM SERPL-MCNC: 2.1 MG/DL
MAN DIFF?: NORMAL
MCHC RBC-ENTMCNC: 27.4 PG
MCHC RBC-ENTMCNC: 32.6 G/DL
MCV RBC AUTO: 84.1 FL
MONOCYTES # BLD AUTO: 1.01 K/UL
MONOCYTES NFR BLD AUTO: 11.2 %
NEUTROPHILS # BLD AUTO: 6.6 K/UL
NEUTROPHILS NFR BLD AUTO: 72.9 %
PHOSPHATE SERPL-MCNC: 2.7 MG/DL
PLATELET # BLD AUTO: 235 K/UL
POTASSIUM SERPL-SCNC: 4.6 MMOL/L
PROT SERPL-MCNC: 7.2 G/DL
RBC # BLD: 3.58 M/UL
RBC # FLD: 16.1 %
SODIUM SERPL-SCNC: 138 MMOL/L
WBC # FLD AUTO: 9.05 K/UL

## 2025-02-17 ENCOUNTER — RX RENEWAL (OUTPATIENT)
Age: 75
End: 2025-02-17

## 2025-02-24 ENCOUNTER — APPOINTMENT (OUTPATIENT)
Dept: NEPHROLOGY | Facility: CLINIC | Age: 75
End: 2025-02-24
Payer: MEDICARE

## 2025-02-24 VITALS — HEART RATE: 61 BPM | DIASTOLIC BLOOD PRESSURE: 53 MMHG | SYSTOLIC BLOOD PRESSURE: 149 MMHG

## 2025-02-24 VITALS — BODY MASS INDEX: 23.72 KG/M2 | WEIGHT: 156 LBS

## 2025-02-24 DIAGNOSIS — I50.9 HEART FAILURE, UNSPECIFIED: ICD-10-CM

## 2025-02-24 DIAGNOSIS — N25.81 SECONDARY HYPERPARATHYROIDISM OF RENAL ORIGIN: ICD-10-CM

## 2025-02-24 DIAGNOSIS — E87.5 HYPERKALEMIA: ICD-10-CM

## 2025-02-24 DIAGNOSIS — R79.9 ABNORMAL FINDING OF BLOOD CHEMISTRY, UNSPECIFIED: ICD-10-CM

## 2025-02-24 DIAGNOSIS — R68.89 OTHER GENERAL SYMPTOMS AND SIGNS: ICD-10-CM

## 2025-02-24 DIAGNOSIS — Z79.899 OTHER LONG TERM (CURRENT) DRUG THERAPY: ICD-10-CM

## 2025-02-24 DIAGNOSIS — I10 ESSENTIAL (PRIMARY) HYPERTENSION: ICD-10-CM

## 2025-02-24 DIAGNOSIS — N18.5 CHRONIC KIDNEY DISEASE, STAGE 5: ICD-10-CM

## 2025-02-24 PROCEDURE — G2211 COMPLEX E/M VISIT ADD ON: CPT

## 2025-02-24 PROCEDURE — 36415 COLL VENOUS BLD VENIPUNCTURE: CPT

## 2025-02-24 PROCEDURE — 99215 OFFICE O/P EST HI 40 MIN: CPT

## 2025-02-25 LAB
25(OH)D3 SERPL-MCNC: 31.6 NG/ML
ALBUMIN SERPL ELPH-MCNC: 4 G/DL
ALP BLD-CCNC: 107 U/L
ALT SERPL-CCNC: 10 U/L
ANION GAP SERPL CALC-SCNC: 17 MMOL/L
AST SERPL-CCNC: 14 U/L
BASOPHILS # BLD AUTO: 0.04 K/UL
BASOPHILS NFR BLD AUTO: 0.6 %
BILIRUB SERPL-MCNC: 0.4 MG/DL
BUN SERPL-MCNC: 43 MG/DL
CALCIUM SERPL-MCNC: 8.6 MG/DL
CALCIUM SERPL-MCNC: 8.6 MG/DL
CHLORIDE SERPL-SCNC: 105 MMOL/L
CHOLEST SERPL-MCNC: 169 MG/DL
CO2 SERPL-SCNC: 18 MMOL/L
CREAT SERPL-MCNC: 3.97 MG/DL
CYSTATIN C SERPL-MCNC: 3.69 MG/L
EGFR: 15 ML/MIN/1.73M2
EOSINOPHIL # BLD AUTO: 0.42 K/UL
EOSINOPHIL NFR BLD AUTO: 6.5 %
ESTIMATED AVERAGE GLUCOSE: 97 MG/DL
FERRITIN SERPL-MCNC: 52 NG/ML
GFR/BSA.PRED SERPLBLD CYS-BASED-ARV: 13 ML/MIN/1.73M2
GLUCOSE SERPL-MCNC: 98 MG/DL
HBA1C MFR BLD HPLC: 5 %
HCT VFR BLD CALC: 27.6 %
HDLC SERPL-MCNC: 66 MG/DL
HGB BLD-MCNC: 8.9 G/DL
IMM GRANULOCYTES NFR BLD AUTO: 0.2 %
LDLC SERPL CALC-MCNC: 92 MG/DL
LYMPHOCYTES # BLD AUTO: 0.98 K/UL
LYMPHOCYTES NFR BLD AUTO: 15.1 %
MAGNESIUM SERPL-MCNC: 2 MG/DL
MAN DIFF?: NORMAL
MCHC RBC-ENTMCNC: 27.4 PG
MCHC RBC-ENTMCNC: 32.2 G/DL
MCV RBC AUTO: 84.9 FL
MONOCYTES # BLD AUTO: 0.45 K/UL
MONOCYTES NFR BLD AUTO: 7 %
NEUTROPHILS # BLD AUTO: 4.57 K/UL
NEUTROPHILS NFR BLD AUTO: 70.6 %
NONHDLC SERPL-MCNC: 103 MG/DL
PARATHYROID HORMONE INTACT: 321 PG/ML
PHOSPHATE SERPL-MCNC: 2.8 MG/DL
PLATELET # BLD AUTO: 266 K/UL
POTASSIUM SERPL-SCNC: 3.8 MMOL/L
PROT SERPL-MCNC: 6.5 G/DL
RBC # BLD: 3.25 M/UL
RBC # FLD: 16.5 %
SODIUM SERPL-SCNC: 139 MMOL/L
TRIGL SERPL-MCNC: 57 MG/DL
TSH SERPL-ACNC: 1.99 UIU/ML
VIT B12 SERPL-MCNC: 551 PG/ML
WBC # FLD AUTO: 6.47 K/UL

## 2025-03-31 ENCOUNTER — APPOINTMENT (OUTPATIENT)
Dept: NEPHROLOGY | Facility: CLINIC | Age: 75
End: 2025-03-31
Payer: MEDICARE

## 2025-03-31 VITALS — HEART RATE: 57 BPM | DIASTOLIC BLOOD PRESSURE: 49 MMHG | SYSTOLIC BLOOD PRESSURE: 141 MMHG

## 2025-03-31 VITALS — WEIGHT: 155 LBS | BODY MASS INDEX: 23.57 KG/M2

## 2025-03-31 DIAGNOSIS — E87.5 HYPERKALEMIA: ICD-10-CM

## 2025-03-31 DIAGNOSIS — I50.9 HEART FAILURE, UNSPECIFIED: ICD-10-CM

## 2025-03-31 DIAGNOSIS — Z79.899 OTHER LONG TERM (CURRENT) DRUG THERAPY: ICD-10-CM

## 2025-03-31 DIAGNOSIS — N25.81 SECONDARY HYPERPARATHYROIDISM OF RENAL ORIGIN: ICD-10-CM

## 2025-03-31 DIAGNOSIS — R68.89 OTHER GENERAL SYMPTOMS AND SIGNS: ICD-10-CM

## 2025-03-31 DIAGNOSIS — R79.9 ABNORMAL FINDING OF BLOOD CHEMISTRY, UNSPECIFIED: ICD-10-CM

## 2025-03-31 DIAGNOSIS — N18.5 CHRONIC KIDNEY DISEASE, STAGE 5: ICD-10-CM

## 2025-03-31 PROCEDURE — 36415 COLL VENOUS BLD VENIPUNCTURE: CPT

## 2025-03-31 PROCEDURE — G2211 COMPLEX E/M VISIT ADD ON: CPT

## 2025-03-31 PROCEDURE — 99214 OFFICE O/P EST MOD 30 MIN: CPT

## 2025-04-01 LAB
ALBUMIN SERPL ELPH-MCNC: 4.4 G/DL
ALP BLD-CCNC: 117 U/L
ALT SERPL-CCNC: 6 U/L
ANION GAP SERPL CALC-SCNC: 16 MMOL/L
AST SERPL-CCNC: 10 U/L
BASOPHILS # BLD AUTO: 0.06 K/UL
BASOPHILS NFR BLD AUTO: 0.8 %
BILIRUB SERPL-MCNC: 0.3 MG/DL
BUN SERPL-MCNC: 67 MG/DL
CALCIUM SERPL-MCNC: 8.6 MG/DL
CHLORIDE SERPL-SCNC: 106 MMOL/L
CO2 SERPL-SCNC: 16 MMOL/L
CREAT SERPL-MCNC: 5.16 MG/DL
CYSTATIN C SERPL-MCNC: 4.21 MG/L
EGFRCR SERPLBLD CKD-EPI 2021: 11 ML/MIN/1.73M2
EOSINOPHIL # BLD AUTO: 0.38 K/UL
EOSINOPHIL NFR BLD AUTO: 4.9 %
GFR/BSA.PRED SERPLBLD CYS-BASED-ARV: 11 ML/MIN/1.73M2
GLUCOSE SERPL-MCNC: 92 MG/DL
HCT VFR BLD CALC: 29.3 %
HGB BLD-MCNC: 9.7 G/DL
IMM GRANULOCYTES NFR BLD AUTO: 0.1 %
LYMPHOCYTES # BLD AUTO: 0.93 K/UL
LYMPHOCYTES NFR BLD AUTO: 12 %
MAGNESIUM SERPL-MCNC: 2.3 MG/DL
MAN DIFF?: NORMAL
MCHC RBC-ENTMCNC: 27.6 PG
MCHC RBC-ENTMCNC: 33.1 G/DL
MCV RBC AUTO: 83.5 FL
MONOCYTES # BLD AUTO: 0.65 K/UL
MONOCYTES NFR BLD AUTO: 8.4 %
NEUTROPHILS # BLD AUTO: 5.75 K/UL
NEUTROPHILS NFR BLD AUTO: 73.8 %
PHOSPHATE SERPL-MCNC: 3.2 MG/DL
PLATELET # BLD AUTO: 269 K/UL
POTASSIUM SERPL-SCNC: 4.8 MMOL/L
PROT SERPL-MCNC: 7.2 G/DL
RBC # BLD: 3.51 M/UL
RBC # FLD: 17.8 %
SODIUM SERPL-SCNC: 138 MMOL/L
WBC # FLD AUTO: 7.78 K/UL

## 2025-04-30 ENCOUNTER — APPOINTMENT (OUTPATIENT)
Dept: NEPHROLOGY | Facility: CLINIC | Age: 75
End: 2025-04-30
Payer: MEDICARE

## 2025-04-30 VITALS — HEART RATE: 57 BPM | DIASTOLIC BLOOD PRESSURE: 48 MMHG | SYSTOLIC BLOOD PRESSURE: 139 MMHG

## 2025-04-30 VITALS — WEIGHT: 156 LBS | BODY MASS INDEX: 23.72 KG/M2

## 2025-04-30 DIAGNOSIS — E87.5 HYPERKALEMIA: ICD-10-CM

## 2025-04-30 DIAGNOSIS — I10 ESSENTIAL (PRIMARY) HYPERTENSION: ICD-10-CM

## 2025-04-30 DIAGNOSIS — N18.5 CHRONIC KIDNEY DISEASE, STAGE 5: ICD-10-CM

## 2025-04-30 DIAGNOSIS — N25.81 SECONDARY HYPERPARATHYROIDISM OF RENAL ORIGIN: ICD-10-CM

## 2025-04-30 DIAGNOSIS — I50.9 HEART FAILURE, UNSPECIFIED: ICD-10-CM

## 2025-04-30 DIAGNOSIS — N18.4 CHRONIC KIDNEY DISEASE, STAGE 4 (SEVERE): ICD-10-CM

## 2025-04-30 DIAGNOSIS — D63.1 CHRONIC KIDNEY DISEASE, STAGE 4 (SEVERE): ICD-10-CM

## 2025-04-30 PROCEDURE — 36415 COLL VENOUS BLD VENIPUNCTURE: CPT

## 2025-04-30 PROCEDURE — G2211 COMPLEX E/M VISIT ADD ON: CPT

## 2025-04-30 PROCEDURE — 99215 OFFICE O/P EST HI 40 MIN: CPT

## 2025-05-09 LAB
ALBUMIN SERPL ELPH-MCNC: 4.5 G/DL
ALP BLD-CCNC: 104 U/L
ALT SERPL-CCNC: 13 U/L
ANION GAP SERPL CALC-SCNC: 19 MMOL/L
AST SERPL-CCNC: 17 U/L
BASOPHILS # BLD AUTO: 0.07 K/UL
BASOPHILS NFR BLD AUTO: 0.7 %
BILIRUB SERPL-MCNC: 0.2 MG/DL
BUN SERPL-MCNC: 72 MG/DL
CALCIUM SERPL-MCNC: 8.7 MG/DL
CHLORIDE SERPL-SCNC: 106 MMOL/L
CO2 SERPL-SCNC: 13 MMOL/L
CREAT SERPL-MCNC: 5.45 MG/DL
CYSTATIN C SERPL-MCNC: 4.19 MG/L
EGFRCR SERPLBLD CKD-EPI 2021: 10 ML/MIN/1.73M2
EOSINOPHIL # BLD AUTO: 0.54 K/UL
EOSINOPHIL NFR BLD AUTO: 5 %
FERRITIN SERPL-MCNC: 33 NG/ML
GFR/BSA.PRED SERPLBLD CYS-BASED-ARV: 11 ML/MIN/1.73M2
GLUCOSE SERPL-MCNC: 93 MG/DL
HCT VFR BLD CALC: 29.6 %
HGB BLD-MCNC: 9.8 G/DL
IMM GRANULOCYTES NFR BLD AUTO: 0.5 %
LYMPHOCYTES # BLD AUTO: 1.11 K/UL
LYMPHOCYTES NFR BLD AUTO: 10.4 %
MAGNESIUM SERPL-MCNC: 2.1 MG/DL
MAN DIFF?: NORMAL
MCHC RBC-ENTMCNC: 27.8 PG
MCHC RBC-ENTMCNC: 33.1 G/DL
MCV RBC AUTO: 83.9 FL
MONOCYTES # BLD AUTO: 0.71 K/UL
MONOCYTES NFR BLD AUTO: 6.6 %
NEUTROPHILS # BLD AUTO: 8.22 K/UL
NEUTROPHILS NFR BLD AUTO: 76.8 %
PHOSPHATE SERPL-MCNC: 3.7 MG/DL
PLATELET # BLD AUTO: 247 K/UL
POTASSIUM SERPL-SCNC: 4.6 MMOL/L
PROT SERPL-MCNC: 7.1 G/DL
RBC # BLD: 3.53 M/UL
RBC # FLD: 16.8 %
SODIUM SERPL-SCNC: 138 MMOL/L
WBC # FLD AUTO: 10.7 K/UL

## 2025-06-06 ENCOUNTER — APPOINTMENT (OUTPATIENT)
Dept: NEPHROLOGY | Facility: CLINIC | Age: 75
End: 2025-06-06
Payer: MEDICARE

## 2025-06-06 VITALS — HEART RATE: 62 BPM | DIASTOLIC BLOOD PRESSURE: 56 MMHG | SYSTOLIC BLOOD PRESSURE: 154 MMHG

## 2025-06-06 PROCEDURE — 36415 COLL VENOUS BLD VENIPUNCTURE: CPT

## 2025-06-06 PROCEDURE — G2211 COMPLEX E/M VISIT ADD ON: CPT

## 2025-06-06 PROCEDURE — 99215 OFFICE O/P EST HI 40 MIN: CPT

## 2025-06-09 LAB
ALBUMIN SERPL ELPH-MCNC: 4.2 G/DL
ALP BLD-CCNC: 108 U/L
ALT SERPL-CCNC: 16 U/L
ANION GAP SERPL CALC-SCNC: 15 MMOL/L
AST SERPL-CCNC: 12 U/L
BASOPHILS # BLD AUTO: 0.06 K/UL
BASOPHILS NFR BLD AUTO: 0.8 %
BILIRUB SERPL-MCNC: 0.2 MG/DL
BUN SERPL-MCNC: 52 MG/DL
CALCIUM SERPL-MCNC: 8.4 MG/DL
CHLORIDE SERPL-SCNC: 105 MMOL/L
CO2 SERPL-SCNC: 15 MMOL/L
CREAT SERPL-MCNC: 5.25 MG/DL
CYSTATIN C SERPL-MCNC: 3.95 MG/L
EGFRCR SERPLBLD CKD-EPI 2021: 11 ML/MIN/1.73M2
EOSINOPHIL # BLD AUTO: 0.53 K/UL
EOSINOPHIL NFR BLD AUTO: 7.3 %
GFR/BSA.PRED SERPLBLD CYS-BASED-ARV: 12 ML/MIN/1.73M2
GLUCOSE SERPL-MCNC: 93 MG/DL
HCT VFR BLD CALC: 29.4 %
HGB BLD-MCNC: 9.3 G/DL
IMM GRANULOCYTES NFR BLD AUTO: 0.4 %
LYMPHOCYTES # BLD AUTO: 1.05 K/UL
LYMPHOCYTES NFR BLD AUTO: 14.4 %
MAGNESIUM SERPL-MCNC: 2 MG/DL
MAN DIFF?: NORMAL
MCHC RBC-ENTMCNC: 27.2 PG
MCHC RBC-ENTMCNC: 31.6 G/DL
MCV RBC AUTO: 86 FL
MONOCYTES # BLD AUTO: 0.73 K/UL
MONOCYTES NFR BLD AUTO: 10 %
NEUTROPHILS # BLD AUTO: 4.89 K/UL
NEUTROPHILS NFR BLD AUTO: 67.1 %
PHOSPHATE SERPL-MCNC: 3 MG/DL
PLATELET # BLD AUTO: 209 K/UL
POTASSIUM SERPL-SCNC: 5 MMOL/L
PROT SERPL-MCNC: 6.9 G/DL
RBC # BLD: 3.42 M/UL
RBC # FLD: 16 %
SODIUM SERPL-SCNC: 135 MMOL/L
WBC # FLD AUTO: 7.29 K/UL

## 2025-06-19 ENCOUNTER — RX RENEWAL (OUTPATIENT)
Age: 75
End: 2025-06-19

## 2025-07-08 ENCOUNTER — APPOINTMENT (OUTPATIENT)
Dept: NEPHROLOGY | Facility: CLINIC | Age: 75
End: 2025-07-08
Payer: MEDICARE

## 2025-07-08 VITALS — SYSTOLIC BLOOD PRESSURE: 129 MMHG | DIASTOLIC BLOOD PRESSURE: 53 MMHG | HEART RATE: 62 BPM

## 2025-07-08 PROCEDURE — 36415 COLL VENOUS BLD VENIPUNCTURE: CPT

## 2025-07-08 PROCEDURE — 99215 OFFICE O/P EST HI 40 MIN: CPT

## 2025-07-08 PROCEDURE — G2211 COMPLEX E/M VISIT ADD ON: CPT

## 2025-07-10 LAB
25(OH)D3 SERPL-MCNC: 31 NG/ML
ALBUMIN SERPL ELPH-MCNC: 4.1 G/DL
ALP BLD-CCNC: 110 U/L
ALT SERPL-CCNC: 14 U/L
ANION GAP SERPL CALC-SCNC: 15 MMOL/L
AST SERPL-CCNC: 9 U/L
BASOPHILS # BLD AUTO: 0.05 K/UL
BASOPHILS NFR BLD AUTO: 0.6 %
BILIRUB SERPL-MCNC: 0.4 MG/DL
BUN SERPL-MCNC: 51 MG/DL
CALCIUM SERPL-MCNC: 8.5 MG/DL
CALCIUM SERPL-MCNC: 8.5 MG/DL
CHLORIDE SERPL-SCNC: 108 MMOL/L
CHOLEST SERPL-MCNC: 172 MG/DL
CO2 SERPL-SCNC: 15 MMOL/L
CREAT SERPL-MCNC: 5.62 MG/DL
CYSTATIN C SERPL-MCNC: 4.3 MG/L
EGFRCR SERPLBLD CKD-EPI 2021: 10 ML/MIN/1.73M2
EOSINOPHIL # BLD AUTO: 0.64 K/UL
EOSINOPHIL NFR BLD AUTO: 8.2 %
ESTIMATED AVERAGE GLUCOSE: 97 MG/DL
FERRITIN SERPL-MCNC: 34 NG/ML
GFR/BSA.PRED SERPLBLD CYS-BASED-ARV: 11 ML/MIN/1.73M2
GLUCOSE SERPL-MCNC: 98 MG/DL
HBA1C MFR BLD HPLC: 5 %
HCT VFR BLD CALC: 29.4 %
HDLC SERPL-MCNC: 61 MG/DL
HGB BLD-MCNC: 9.3 G/DL
IMM GRANULOCYTES NFR BLD AUTO: 0.4 %
LDLC SERPL-MCNC: 98 MG/DL
LYMPHOCYTES # BLD AUTO: 0.98 K/UL
LYMPHOCYTES NFR BLD AUTO: 12.6 %
MAGNESIUM SERPL-MCNC: 1.8 MG/DL
MAN DIFF?: NORMAL
MCHC RBC-ENTMCNC: 27.2 PG
MCHC RBC-ENTMCNC: 31.6 G/DL
MCV RBC AUTO: 86 FL
MONOCYTES # BLD AUTO: 0.53 K/UL
MONOCYTES NFR BLD AUTO: 6.8 %
NEUTROPHILS # BLD AUTO: 5.54 K/UL
NEUTROPHILS NFR BLD AUTO: 71.4 %
NONHDLC SERPL-MCNC: 111 MG/DL
PARATHYROID HORMONE INTACT: 390 PG/ML
PHOSPHATE SERPL-MCNC: 3.6 MG/DL
PLATELET # BLD AUTO: 252 K/UL
POTASSIUM SERPL-SCNC: 5.4 MMOL/L
PROT SERPL-MCNC: 6.8 G/DL
RBC # BLD: 3.42 M/UL
RBC # FLD: 16.6 %
SODIUM SERPL-SCNC: 138 MMOL/L
TRIGL SERPL-MCNC: 70 MG/DL
TSH SERPL-ACNC: 2.3 UIU/ML
VIT B12 SERPL-MCNC: 617 PG/ML
WBC # FLD AUTO: 7.77 K/UL

## 2025-08-11 ENCOUNTER — APPOINTMENT (OUTPATIENT)
Dept: NEPHROLOGY | Facility: CLINIC | Age: 75
End: 2025-08-11
Payer: MEDICARE

## 2025-08-11 ENCOUNTER — LABORATORY RESULT (OUTPATIENT)
Age: 75
End: 2025-08-11

## 2025-08-11 VITALS — SYSTOLIC BLOOD PRESSURE: 157 MMHG | HEART RATE: 62 BPM | DIASTOLIC BLOOD PRESSURE: 56 MMHG

## 2025-08-11 DIAGNOSIS — N25.81 SECONDARY HYPERPARATHYROIDISM OF RENAL ORIGIN: ICD-10-CM

## 2025-08-11 DIAGNOSIS — I10 ESSENTIAL (PRIMARY) HYPERTENSION: ICD-10-CM

## 2025-08-11 DIAGNOSIS — I50.9 HEART FAILURE, UNSPECIFIED: ICD-10-CM

## 2025-08-11 DIAGNOSIS — N18.5 CHRONIC KIDNEY DISEASE, STAGE 5: ICD-10-CM

## 2025-08-11 PROCEDURE — G2211 COMPLEX E/M VISIT ADD ON: CPT

## 2025-08-11 PROCEDURE — 36415 COLL VENOUS BLD VENIPUNCTURE: CPT

## 2025-08-11 PROCEDURE — 99215 OFFICE O/P EST HI 40 MIN: CPT

## 2025-08-12 LAB
25(OH)D3 SERPL-MCNC: 32.8 NG/ML
ALBUMIN SERPL ELPH-MCNC: 4.3 G/DL
ALBUMIN, RANDOM URINE: 44.6 MG/DL
ALP BLD-CCNC: 129 U/L
ALT SERPL-CCNC: 15 U/L
ANION GAP SERPL CALC-SCNC: 16 MMOL/L
APPEARANCE: CLEAR
AST SERPL-CCNC: 21 U/L
BASOPHILS # BLD AUTO: 0.05 K/UL
BASOPHILS NFR BLD AUTO: 0.6 %
BILIRUB SERPL-MCNC: 0.3 MG/DL
BILIRUBIN URINE: NEGATIVE
BLOOD URINE: NEGATIVE
BUN SERPL-MCNC: 43 MG/DL
CALCIUM SERPL-MCNC: 9 MG/DL
CALCIUM SERPL-MCNC: 9 MG/DL
CHLORIDE SERPL-SCNC: 107 MMOL/L
CHOLEST SERPL-MCNC: 185 MG/DL
CO2 SERPL-SCNC: 17 MMOL/L
COLOR: YELLOW
CREAT SERPL-MCNC: 5.04 MG/DL
CREAT SPEC-SCNC: 81 MG/DL
CREAT SPEC-SCNC: 81 MG/DL
CREAT/PROT UR: 1 RATIO
CYSTATIN C SERPL-MCNC: 4.23 MG/L
EGFRCR SERPLBLD CKD-EPI 2021: 11 ML/MIN/1.73M2
EOSINOPHIL # BLD AUTO: 0.49 K/UL
EOSINOPHIL NFR BLD AUTO: 5.6 %
ESTIMATED AVERAGE GLUCOSE: 100 MG/DL
FERRITIN SERPL-MCNC: 34 NG/ML
GFR/BSA.PRED SERPLBLD CYS-BASED-ARV: 11 ML/MIN/1.73M2
GLUCOSE QUALITATIVE U: NEGATIVE MG/DL
GLUCOSE SERPL-MCNC: 93 MG/DL
HBA1C MFR BLD HPLC: 5.1 %
HCT VFR BLD CALC: 30.1 %
HDLC SERPL-MCNC: 69 MG/DL
HGB BLD-MCNC: 9.7 G/DL
IMM GRANULOCYTES NFR BLD AUTO: 0.2 %
KETONES URINE: NEGATIVE MG/DL
LDLC SERPL-MCNC: 106 MG/DL
LEUKOCYTE ESTERASE URINE: NEGATIVE
LYMPHOCYTES # BLD AUTO: 1.25 K/UL
LYMPHOCYTES NFR BLD AUTO: 14.2 %
MAGNESIUM SERPL-MCNC: 2.4 MG/DL
MAN DIFF?: NORMAL
MCHC RBC-ENTMCNC: 27.3 PG
MCHC RBC-ENTMCNC: 32.2 G/DL
MCV RBC AUTO: 84.8 FL
MICROALBUMIN/CREAT 24H UR-RTO: 553 MG/G
MONOCYTES # BLD AUTO: 0.72 K/UL
MONOCYTES NFR BLD AUTO: 8.2 %
NEUTROPHILS # BLD AUTO: 6.26 K/UL
NEUTROPHILS NFR BLD AUTO: 71.2 %
NITRITE URINE: NEGATIVE
NONHDLC SERPL-MCNC: 117 MG/DL
PARATHYROID HORMONE INTACT: 375 PG/ML
PH URINE: 8
PHOSPHATE SERPL-MCNC: 3 MG/DL
PLATELET # BLD AUTO: 243 K/UL
POTASSIUM SERPL-SCNC: 5 MMOL/L
PROT SERPL-MCNC: 7.1 G/DL
PROT UR-MCNC: 84 MG/DL
PROTEIN URINE: 100 MG/DL
RBC # BLD: 3.55 M/UL
RBC # FLD: 16.1 %
SODIUM SERPL-SCNC: 140 MMOL/L
SPECIFIC GRAVITY URINE: 1.01
TRIGL SERPL-MCNC: 54 MG/DL
TSH SERPL-ACNC: 2.07 UIU/ML
UROBILINOGEN URINE: 0.2 MG/DL
VIT B12 SERPL-MCNC: 675 PG/ML
WBC # FLD AUTO: 8.79 K/UL

## (undated) DEVICE — FORCEP RADIAL JAW 4 W NDL 2.2MM 2.8MM 240CM ORANGE DISP